# Patient Record
Sex: MALE | Race: WHITE | NOT HISPANIC OR LATINO | Employment: OTHER | URBAN - METROPOLITAN AREA
[De-identification: names, ages, dates, MRNs, and addresses within clinical notes are randomized per-mention and may not be internally consistent; named-entity substitution may affect disease eponyms.]

---

## 2018-03-13 ENCOUNTER — OFFICE VISIT CONVERTED (OUTPATIENT)
Dept: FAMILY MEDICINE CLINIC | Facility: CLINIC | Age: 73
End: 2018-03-13
Attending: FAMILY MEDICINE

## 2018-03-13 ENCOUNTER — CONVERSION ENCOUNTER (OUTPATIENT)
Dept: FAMILY MEDICINE CLINIC | Facility: CLINIC | Age: 73
End: 2018-03-13

## 2018-08-14 ENCOUNTER — OFFICE VISIT CONVERTED (OUTPATIENT)
Dept: FAMILY MEDICINE CLINIC | Facility: CLINIC | Age: 73
End: 2018-08-14
Attending: FAMILY MEDICINE

## 2018-10-29 ENCOUNTER — OFFICE VISIT CONVERTED (OUTPATIENT)
Dept: FAMILY MEDICINE CLINIC | Facility: CLINIC | Age: 73
End: 2018-10-29
Attending: FAMILY MEDICINE

## 2018-10-29 ENCOUNTER — CONVERSION ENCOUNTER (OUTPATIENT)
Dept: FAMILY MEDICINE CLINIC | Facility: CLINIC | Age: 73
End: 2018-10-29

## 2019-02-05 ENCOUNTER — CONVERSION ENCOUNTER (OUTPATIENT)
Dept: FAMILY MEDICINE CLINIC | Facility: CLINIC | Age: 74
End: 2019-02-05

## 2019-02-05 ENCOUNTER — OFFICE VISIT CONVERTED (OUTPATIENT)
Dept: FAMILY MEDICINE CLINIC | Facility: CLINIC | Age: 74
End: 2019-02-05
Attending: FAMILY MEDICINE

## 2019-12-10 ENCOUNTER — CONVERSION ENCOUNTER (OUTPATIENT)
Dept: FAMILY MEDICINE CLINIC | Facility: CLINIC | Age: 74
End: 2019-12-10

## 2019-12-10 ENCOUNTER — OFFICE VISIT CONVERTED (OUTPATIENT)
Dept: FAMILY MEDICINE CLINIC | Facility: CLINIC | Age: 74
End: 2019-12-10
Attending: FAMILY MEDICINE

## 2019-12-10 ENCOUNTER — HOSPITAL ENCOUNTER (OUTPATIENT)
Dept: FAMILY MEDICINE CLINIC | Facility: CLINIC | Age: 74
Discharge: HOME | End: 2019-12-10
Attending: FAMILY MEDICINE

## 2019-12-10 LAB
ALBUMIN SERPL-MCNC: 4.6 G/DL (ref 3.5–5)
ALBUMIN/GLOB SERPL: 1.8 {RATIO} (ref 1.4–2.6)
ALP SERPL-CCNC: 79 U/L (ref 56–155)
ALT SERPL-CCNC: 31 U/L (ref 10–40)
ANION GAP SERPL CALC-SCNC: 18 MMOL/L (ref 8–19)
AST SERPL-CCNC: 30 U/L (ref 15–50)
BASOPHILS # BLD AUTO: 0.07 10*3/UL (ref 0–0.2)
BASOPHILS NFR BLD AUTO: 0.8 % (ref 0–3)
BILIRUB SERPL-MCNC: 0.97 MG/DL (ref 0.2–1.3)
BUN SERPL-MCNC: 17 MG/DL (ref 5–25)
BUN/CREAT SERPL: 15 {RATIO} (ref 6–20)
CALCIUM SERPL-MCNC: 9.9 MG/DL (ref 8.7–10.4)
CHLORIDE SERPL-SCNC: 98 MMOL/L (ref 99–111)
CHOLEST SERPL-MCNC: 144 MG/DL (ref 107–200)
CHOLEST/HDLC SERPL: 4.2 {RATIO} (ref 3–6)
CONV ABS IMM GRAN: 0.01 10*3/UL (ref 0–0.2)
CONV CO2: 25 MMOL/L (ref 22–32)
CONV IMMATURE GRAN: 0.1 % (ref 0–1.8)
CONV TOTAL PROTEIN: 7.1 G/DL (ref 6.3–8.2)
CREAT UR-MCNC: 1.1 MG/DL (ref 0.7–1.2)
DEPRECATED RDW RBC AUTO: 44 FL (ref 35.1–43.9)
EOSINOPHIL # BLD AUTO: 0.17 10*3/UL (ref 0–0.7)
EOSINOPHIL # BLD AUTO: 2 % (ref 0–7)
ERYTHROCYTE [DISTWIDTH] IN BLOOD BY AUTOMATED COUNT: 12.1 % (ref 11.6–14.4)
GFR SERPLBLD BASED ON 1.73 SQ M-ARVRAT: >60 ML/MIN/{1.73_M2}
GLOBULIN UR ELPH-MCNC: 2.5 G/DL (ref 2–3.5)
GLUCOSE SERPL-MCNC: 107 MG/DL (ref 70–99)
HCT VFR BLD AUTO: 43.2 % (ref 42–52)
HDLC SERPL-MCNC: 34 MG/DL (ref 40–60)
HGB BLD-MCNC: 14.5 G/DL (ref 14–18)
LDLC SERPL CALC-MCNC: 38 MG/DL (ref 70–100)
LYMPHOCYTES # BLD AUTO: 2.02 10*3/UL (ref 1–5)
LYMPHOCYTES NFR BLD AUTO: 23.9 % (ref 20–45)
MCH RBC QN AUTO: 32.7 PG (ref 27–31)
MCHC RBC AUTO-ENTMCNC: 33.6 G/DL (ref 33–37)
MCV RBC AUTO: 97.3 FL (ref 80–96)
MONOCYTES # BLD AUTO: 0.96 10*3/UL (ref 0.2–1.2)
MONOCYTES NFR BLD AUTO: 11.4 % (ref 3–10)
NEUTROPHILS # BLD AUTO: 5.22 10*3/UL (ref 2–8)
NEUTROPHILS NFR BLD AUTO: 61.8 % (ref 30–85)
NRBC CBCN: 0 % (ref 0–0.7)
OSMOLALITY SERPL CALC.SUM OF ELEC: 286 MOSM/KG (ref 273–304)
PLATELET # BLD AUTO: 250 10*3/UL (ref 130–400)
PMV BLD AUTO: 11.1 FL (ref 9.4–12.4)
POTASSIUM SERPL-SCNC: 4.4 MMOL/L (ref 3.5–5.3)
RBC # BLD AUTO: 4.44 10*6/UL (ref 4.7–6.1)
SODIUM SERPL-SCNC: 137 MMOL/L (ref 135–147)
TRIGL SERPL-MCNC: 360 MG/DL (ref 40–150)
TSH SERPL-ACNC: 1.05 M[IU]/L (ref 0.27–4.2)
VLDLC SERPL-MCNC: 72 MG/DL (ref 5–37)
WBC # BLD AUTO: 8.45 10*3/UL (ref 4.8–10.8)

## 2020-02-20 ENCOUNTER — OFFICE VISIT CONVERTED (OUTPATIENT)
Dept: FAMILY MEDICINE CLINIC | Facility: CLINIC | Age: 75
End: 2020-02-20
Attending: FAMILY MEDICINE

## 2020-10-29 ENCOUNTER — OFFICE VISIT CONVERTED (OUTPATIENT)
Dept: FAMILY MEDICINE CLINIC | Facility: CLINIC | Age: 75
End: 2020-10-29
Attending: FAMILY MEDICINE

## 2020-10-29 ENCOUNTER — CONVERSION ENCOUNTER (OUTPATIENT)
Dept: FAMILY MEDICINE CLINIC | Facility: CLINIC | Age: 75
End: 2020-10-29

## 2020-10-29 ENCOUNTER — HOSPITAL ENCOUNTER (OUTPATIENT)
Dept: FAMILY MEDICINE CLINIC | Facility: CLINIC | Age: 75
Discharge: HOME | End: 2020-10-29
Attending: FAMILY MEDICINE

## 2020-10-29 LAB
BASOPHILS # BLD AUTO: 0.07 10*3/UL (ref 0–0.2)
BASOPHILS NFR BLD AUTO: 0.8 % (ref 0–3)
CONV ABS IMM GRAN: 0.03 10*3/UL (ref 0–0.2)
CONV IMMATURE GRAN: 0.4 % (ref 0–1.8)
DEPRECATED RDW RBC AUTO: 45.7 FL (ref 35.1–43.9)
EOSINOPHIL # BLD AUTO: 0.37 10*3/UL (ref 0–0.7)
EOSINOPHIL # BLD AUTO: 4.5 % (ref 0–7)
ERYTHROCYTE [DISTWIDTH] IN BLOOD BY AUTOMATED COUNT: 12.7 % (ref 11.6–14.4)
ERYTHROCYTE [SEDIMENTATION RATE] IN BLOOD: 14 MM/H (ref 0–20)
HCT VFR BLD AUTO: 42 % (ref 42–52)
HGB BLD-MCNC: 14.4 G/DL (ref 14–18)
LYMPHOCYTES # BLD AUTO: 1.88 10*3/UL (ref 1–5)
LYMPHOCYTES NFR BLD AUTO: 22.7 % (ref 20–45)
MCH RBC QN AUTO: 33.3 PG (ref 27–31)
MCHC RBC AUTO-ENTMCNC: 34.3 G/DL (ref 33–37)
MCV RBC AUTO: 97 FL (ref 80–96)
MONOCYTES # BLD AUTO: 0.83 10*3/UL (ref 0.2–1.2)
MONOCYTES NFR BLD AUTO: 10 % (ref 3–10)
NEUTROPHILS # BLD AUTO: 5.12 10*3/UL (ref 2–8)
NEUTROPHILS NFR BLD AUTO: 61.6 % (ref 30–85)
NRBC CBCN: 0 % (ref 0–0.7)
PLATELET # BLD AUTO: 256 10*3/UL (ref 130–400)
PMV BLD AUTO: 11.2 FL (ref 9.4–12.4)
RBC # BLD AUTO: 4.33 10*6/UL (ref 4.7–6.1)
WBC # BLD AUTO: 8.3 10*3/UL (ref 4.8–10.8)

## 2020-10-30 ENCOUNTER — HOSPITAL ENCOUNTER (OUTPATIENT)
Dept: URGENT CARE | Facility: CLINIC | Age: 75
Discharge: HOME | End: 2020-10-30
Attending: NURSE PRACTITIONER

## 2020-11-01 LAB
CONV CELIAC DISEASE AB-IGA: 175 MG/DL (ref 68–408)
SARS-COV-2 RNA SPEC QL NAA+PROBE: NOT DETECTED
TTG IGA SER-ACNC: <2 U/ML (ref 0–3)

## 2020-12-16 ENCOUNTER — OFFICE VISIT CONVERTED (OUTPATIENT)
Dept: FAMILY MEDICINE CLINIC | Facility: CLINIC | Age: 75
End: 2020-12-16
Attending: FAMILY MEDICINE

## 2020-12-16 ENCOUNTER — HOSPITAL ENCOUNTER (OUTPATIENT)
Dept: FAMILY MEDICINE CLINIC | Facility: CLINIC | Age: 75
Discharge: HOME | End: 2020-12-16
Attending: FAMILY MEDICINE

## 2020-12-16 ENCOUNTER — CONVERSION ENCOUNTER (OUTPATIENT)
Dept: FAMILY MEDICINE CLINIC | Facility: CLINIC | Age: 75
End: 2020-12-16

## 2020-12-16 LAB
ALBUMIN SERPL-MCNC: 4.5 G/DL (ref 3.5–5)
ALBUMIN/GLOB SERPL: 1.6 {RATIO} (ref 1.4–2.6)
ALP SERPL-CCNC: 83 U/L (ref 56–155)
ALT SERPL-CCNC: 27 U/L (ref 10–40)
ANION GAP SERPL CALC-SCNC: 17 MMOL/L (ref 8–19)
AST SERPL-CCNC: 27 U/L (ref 15–50)
BASOPHILS # BLD AUTO: 0.06 10*3/UL (ref 0–0.2)
BASOPHILS NFR BLD AUTO: 0.7 % (ref 0–3)
BILIRUB SERPL-MCNC: 0.71 MG/DL (ref 0.2–1.3)
BUN SERPL-MCNC: 20 MG/DL (ref 5–25)
BUN/CREAT SERPL: 17 {RATIO} (ref 6–20)
CALCIUM SERPL-MCNC: 9.6 MG/DL (ref 8.7–10.4)
CHLORIDE SERPL-SCNC: 97 MMOL/L (ref 99–111)
CHOLEST SERPL-MCNC: 173 MG/DL (ref 107–200)
CHOLEST/HDLC SERPL: 4.2 {RATIO} (ref 3–6)
CONV ABS IMM GRAN: 0.03 10*3/UL (ref 0–0.2)
CONV CO2: 28 MMOL/L (ref 22–32)
CONV IMMATURE GRAN: 0.3 % (ref 0–1.8)
CONV TOTAL PROTEIN: 7.3 G/DL (ref 6.3–8.2)
CREAT UR-MCNC: 1.2 MG/DL (ref 0.7–1.2)
DEPRECATED RDW RBC AUTO: 44.9 FL (ref 35.1–43.9)
EOSINOPHIL # BLD AUTO: 0.17 10*3/UL (ref 0–0.7)
EOSINOPHIL # BLD AUTO: 1.9 % (ref 0–7)
ERYTHROCYTE [DISTWIDTH] IN BLOOD BY AUTOMATED COUNT: 12.6 % (ref 11.6–14.4)
GFR SERPLBLD BASED ON 1.73 SQ M-ARVRAT: 58 ML/MIN/{1.73_M2}
GLOBULIN UR ELPH-MCNC: 2.8 G/DL (ref 2–3.5)
GLUCOSE SERPL-MCNC: 96 MG/DL (ref 70–99)
HCT VFR BLD AUTO: 44.8 % (ref 42–52)
HDLC SERPL-MCNC: 41 MG/DL (ref 40–60)
HGB BLD-MCNC: 15.3 G/DL (ref 14–18)
LDLC SERPL CALC-MCNC: 69 MG/DL (ref 70–100)
LYMPHOCYTES # BLD AUTO: 1.95 10*3/UL (ref 1–5)
LYMPHOCYTES NFR BLD AUTO: 22.3 % (ref 20–45)
MCH RBC QN AUTO: 33 PG (ref 27–31)
MCHC RBC AUTO-ENTMCNC: 34.2 G/DL (ref 33–37)
MCV RBC AUTO: 96.6 FL (ref 80–96)
MONOCYTES # BLD AUTO: 0.87 10*3/UL (ref 0.2–1.2)
MONOCYTES NFR BLD AUTO: 9.9 % (ref 3–10)
NEUTROPHILS # BLD AUTO: 5.67 10*3/UL (ref 2–8)
NEUTROPHILS NFR BLD AUTO: 64.9 % (ref 30–85)
NRBC CBCN: 0 % (ref 0–0.7)
OSMOLALITY SERPL CALC.SUM OF ELEC: 288 MOSM/KG (ref 273–304)
PLATELET # BLD AUTO: 268 10*3/UL (ref 130–400)
PMV BLD AUTO: 11.1 FL (ref 9.4–12.4)
POTASSIUM SERPL-SCNC: 4.2 MMOL/L (ref 3.5–5.3)
RBC # BLD AUTO: 4.64 10*6/UL (ref 4.7–6.1)
SODIUM SERPL-SCNC: 138 MMOL/L (ref 135–147)
TRIGL SERPL-MCNC: 453 MG/DL (ref 40–150)
TSH SERPL-ACNC: 1.88 M[IU]/L (ref 0.27–4.2)
WBC # BLD AUTO: 8.75 10*3/UL (ref 4.8–10.8)

## 2021-02-03 ENCOUNTER — OFFICE VISIT CONVERTED (OUTPATIENT)
Dept: FAMILY MEDICINE CLINIC | Facility: CLINIC | Age: 76
End: 2021-02-03
Attending: FAMILY MEDICINE

## 2021-02-03 ENCOUNTER — HOSPITAL ENCOUNTER (OUTPATIENT)
Dept: GENERAL RADIOLOGY | Facility: HOSPITAL | Age: 76
Discharge: HOME | End: 2021-02-03
Attending: FAMILY MEDICINE

## 2021-02-03 ENCOUNTER — HOSPITAL ENCOUNTER (OUTPATIENT)
Dept: FAMILY MEDICINE CLINIC | Facility: CLINIC | Age: 76
Discharge: HOME | End: 2021-02-03
Attending: FAMILY MEDICINE

## 2021-02-03 LAB — ERYTHROCYTE [SEDIMENTATION RATE] IN BLOOD: 6 MM/H (ref 0–20)

## 2021-02-09 ENCOUNTER — HOSPITAL ENCOUNTER (OUTPATIENT)
Dept: PHYSICAL THERAPY | Facility: CLINIC | Age: 76
Setting detail: RECURRING SERIES
Discharge: HOME | End: 2021-03-11
Attending: FAMILY MEDICINE

## 2021-04-19 ENCOUNTER — CONVERSION ENCOUNTER (OUTPATIENT)
Dept: FAMILY MEDICINE CLINIC | Facility: CLINIC | Age: 76
End: 2021-04-19

## 2021-04-19 ENCOUNTER — OFFICE VISIT CONVERTED (OUTPATIENT)
Dept: FAMILY MEDICINE CLINIC | Facility: CLINIC | Age: 76
End: 2021-04-19
Attending: FAMILY MEDICINE

## 2021-04-19 ENCOUNTER — HOSPITAL ENCOUNTER (OUTPATIENT)
Dept: FAMILY MEDICINE CLINIC | Facility: CLINIC | Age: 76
Discharge: HOME | End: 2021-04-19
Attending: FAMILY MEDICINE

## 2021-04-19 LAB
ALBUMIN SERPL-MCNC: 4.3 G/DL (ref 3.5–5)
ALBUMIN/GLOB SERPL: 1.6 {RATIO} (ref 1.4–2.6)
ALP SERPL-CCNC: 73 U/L (ref 56–155)
ALT SERPL-CCNC: 28 U/L (ref 10–40)
ANION GAP SERPL CALC-SCNC: 15 MMOL/L (ref 8–19)
AST SERPL-CCNC: 24 U/L (ref 15–50)
BASOPHILS # BLD AUTO: 0.05 10*3/UL (ref 0–0.2)
BASOPHILS NFR BLD AUTO: 0.7 % (ref 0–3)
BILIRUB SERPL-MCNC: 0.67 MG/DL (ref 0.2–1.3)
BUN SERPL-MCNC: 15 MG/DL (ref 5–25)
BUN/CREAT SERPL: 14 {RATIO} (ref 6–20)
CALCIUM SERPL-MCNC: 9.6 MG/DL (ref 8.7–10.4)
CHLORIDE SERPL-SCNC: 99 MMOL/L (ref 99–111)
CONV ABS IMM GRAN: 0.02 10*3/UL (ref 0–0.2)
CONV CO2: 28 MMOL/L (ref 22–32)
CONV IMMATURE GRAN: 0.3 % (ref 0–1.8)
CONV TOTAL PROTEIN: 7 G/DL (ref 6.3–8.2)
CREAT UR-MCNC: 1.11 MG/DL (ref 0.7–1.2)
DEPRECATED RDW RBC AUTO: 44.8 FL (ref 35.1–43.9)
EOSINOPHIL # BLD AUTO: 0.16 10*3/UL (ref 0–0.7)
EOSINOPHIL # BLD AUTO: 2.2 % (ref 0–7)
ERYTHROCYTE [DISTWIDTH] IN BLOOD BY AUTOMATED COUNT: 12.7 % (ref 11.6–14.4)
GFR SERPLBLD BASED ON 1.73 SQ M-ARVRAT: >60 ML/MIN/{1.73_M2}
GLOBULIN UR ELPH-MCNC: 2.7 G/DL (ref 2–3.5)
GLUCOSE SERPL-MCNC: 108 MG/DL (ref 70–99)
HCT VFR BLD AUTO: 44.6 % (ref 42–52)
HGB BLD-MCNC: 15.1 G/DL (ref 14–18)
LYMPHOCYTES # BLD AUTO: 1.73 10*3/UL (ref 1–5)
LYMPHOCYTES NFR BLD AUTO: 23.9 % (ref 20–45)
MCH RBC QN AUTO: 32.5 PG (ref 27–31)
MCHC RBC AUTO-ENTMCNC: 33.9 G/DL (ref 33–37)
MCV RBC AUTO: 95.9 FL (ref 80–96)
MONOCYTES # BLD AUTO: 0.75 10*3/UL (ref 0.2–1.2)
MONOCYTES NFR BLD AUTO: 10.4 % (ref 3–10)
NEUTROPHILS # BLD AUTO: 4.52 10*3/UL (ref 2–8)
NEUTROPHILS NFR BLD AUTO: 62.5 % (ref 30–85)
NRBC CBCN: 0 % (ref 0–0.7)
OSMOLALITY SERPL CALC.SUM OF ELEC: 285 MOSM/KG (ref 273–304)
PLATELET # BLD AUTO: 252 10*3/UL (ref 130–400)
PMV BLD AUTO: 11.2 FL (ref 9.4–12.4)
POTASSIUM SERPL-SCNC: 4.6 MMOL/L (ref 3.5–5.3)
RBC # BLD AUTO: 4.65 10*6/UL (ref 4.7–6.1)
SODIUM SERPL-SCNC: 137 MMOL/L (ref 135–147)
TSH SERPL-ACNC: 2.23 M[IU]/L (ref 0.27–4.2)
VIT B12 SERPL-MCNC: 236 PG/ML (ref 211–911)
WBC # BLD AUTO: 7.23 10*3/UL (ref 4.8–10.8)

## 2021-04-20 ENCOUNTER — HOSPITAL ENCOUNTER (OUTPATIENT)
Dept: GENERAL RADIOLOGY | Facility: HOSPITAL | Age: 76
Discharge: HOME | End: 2021-04-20
Attending: FAMILY MEDICINE

## 2021-05-13 NOTE — PROGRESS NOTES
Progress Note      Patient Name: Dioni Macias   Patient ID: [id number]   Sex: Male   YOB: 1945    Primary Care Provider: Ubaldo Casillas III MD   Referring Provider: Ubaldo Casillas III MD    Visit Date: October 29, 2020    Provider: Ubaldo Casillas III MD   Location: [facility]   Location Address: [address] [place] [address]  [id number]   Location Phone: [phone]          Chief Complaint  · having diarrhea on and off for 2 months      History Of Present Illness  Dioni Macias is a 75 year old /White male who presents for evaluation and treatment of: Here today c/o diarrhea on and off for 2 months, he states he has abdominal cramping then he will have diarrhea. He had one episodes of spasms that he didn't make it to the bathroom, it doesn't give you any warning just happens suddenly.      HPI     patient 75 years old has had been having had on and off some liquid bowel movements more in the last 2 months.  Daughter is quite ill with cancer.  The patient also will have formed stool and often have one diarrhea discussed milk and limiting it.  Discussed just taking Metamucil.  When he has a diarrhea he does have lower abdominal cramping.    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  Respiratory no shortness of breath dyspnea exertion.  GI patient has lost some weight but was happy to lose it.  No recent antibiotics.  Not been out of the country.    Physical exam     weight is 166 this is a 8 pound weight loss since February.  Pulse ox is 97 heart rate 73 temperature 97.5 blood pressure 130/70.  No distress  Cardiovascular regular rhythm no murmur   respiratory no increased work of breathing lungs clear and equal bilaterally no rales no rhonchi  wheezes  Abdomen soft nontender no hepatosplenomegaly no masses no abnormal pulsations    Assessment     probable irritable bowel.  Need stool testing also will do sed rate and celiac sprue and CBC.    Plan     await test start Metamucil.  Also needs stool for C. difficile culture.       Past Medical History  Disease Name Date Onset Notes   Depression --  --    Depression, major, recurrent, mild 08/14/2018 --    Diverticulosis Of Colon --  --    Gastric reflux 08/14/2018 --    High blood pressure 12/10/2019 --    High cholesterol 12/10/2019 --    Hyperlipidemia NEC/NOS --  --    Medication management 12/10/2019 --    Routine adult health maintenance 12/10/2019 --          Past Surgical History  Procedure Name Date Notes   Cholecystectomy ? --    Colonoscopy --  --          Medication List  Name Date Started Instructions   atorvastatin 40 mg oral tablet 12/10/2019 TAKE ONE TABLET BY MOUTH EVERY NIGHT AT BEDTIME   duloxetine 60 mg oral capsule,delayed release(DR/EC) 12/10/2019 TAKE ONE CAPSULE BY MOUTH EVERY DAY   metoprolol succinate 25 mg oral tablet extended release 24 hr 12/10/2019 TAKE ONE-HALF TABLET BY MOUTH DAILY         Allergy List  Allergen Name Date Reaction Notes   NO KNOWN DRUG ALLERGIES --  --  --        Allergies Reconciled  Family Medical History  Disease Name Relative/Age Notes   Lung Neoplasm, Malignant Father/74   --    Stomach Neoplasm, Malignant Mother/66   --    Prostate cancer Brother/   --          Social History  Finding Status Start/Stop Quantity Notes   Advance Care Plan/Surrogate Decision Maker scanned into EMR --  --/-- --  --    Alcohol Current some day --/-- 2-4 per month 03/28/2017 -     --  --/-- --  --    Tobacco Former 15 1964/55 1997 1 pack QD --          Immunizations  NameDate Admin Mfg Trade Name Lot Number Route Inj VIS Given VIS Publication   Hepatitis A08/14/2018 DAISHA HAVRIX-ADULT  NE NE 10/29/2018 07/20/2016   Comments: pharmacy   Wezwmamlz46/29/2020 DAISHA FLUZONE-HIGH  "DOSE [id number] IM LD 09/29/2020    Comments: NDC: 74775-309-36 patient tolerated well   Prevnar 1311/26/2012 NE Not Entered  NE NE 10/29/2018 11/05/2015   Comments:    Vdgtwahl10/14/2018 NE Not Entered  NE NE 10/29/2018    Comments: shingrix         Review of Systems  · Constitutional  o * See HPI  · Eyes  o * See HPI  · HENT  o * See HPI  · Breasts  o * See HPI  · Cardiovascular  o * See HPI  · Respiratory  o * See HPI  · Gastrointestinal  o * See HPI  · Genitourinary  o * See HPI  · Integument  o * See HPI  · Neurologic  o * See HPI  · Musculoskeletal  o * See HPI  · Endocrine  o * See HPI  · Psychiatric  o * See HPI  · Heme-Lymph  o * See HPI  · Allergic-Immunologic  o * See HPI      Vitals  Date Time BP Position Site L\R Cuff Size HR RR TEMP (F) WT  HT  BMI kg/m2 BSA m2 O2 Sat FR L/min FiO2 HC       10/29/2020 02:25 /70 Sitting    73 - R  97.5 165lbs 16oz 5'  10\" 23.82 1.93 97 %  21%              Assessment  · Diarrhea     787.91/R19.7  · Fatigue     780.79/R53.83  · Abdominal cramping     789.00/R10.9    Problems Reconciled  Plan  · Orders  o Stool culture and sensitivity (69676) - 787.91/R19.7 - 10/29/2020  o C difficile Toxigenic Assay (PCR) [facility] (39961) - 787.91/R19.7 - 10/29/2020  o Celiac Disease Antibody Panel(Profile) (CELID) - 787.91/R19.7 - 10/29/2020  o CBC with Auto Diff [facility] (50716) - 787.91/R19.7, 789.00/R10.9, 780.79/R53.83 - 10/29/2020  o ACO-39: Current medications updated and reviewed (, 1159F) - - 10/29/2020  o Sed rate (20380) - 787.91/R19.7 - 10/29/2020  o [facility] NCOV2 (send-out) (16585) - 787.91/R19.7, 789.00/R10.9 - 10/29/2020  · Medications  o azithromycin 250 mg oral tablet   SIG: take 2 tablets (500 mg) by oral route once daily for 1 day then 1 tablet (250 mg) by oral route once daily for 4 days   DISP: (6) tablets with 0 refills  Discontinued on 10/29/2020     o omeprazole 40 mg oral capsule,delayed release(DR/EC)   SIG: take 1 capsule (40 mg) by oral route once " daily before a meal for 90 days   DISP: (90) Capsule with 3 refills  Discontinued on 10/29/2020     o Medications have been Reconciled  o Transition of Care or Provider Policy  · Instructions  o Patient is taking medications as prescribed and doing well.   o Take all medications as prescribed/directed.  o Patient instructed/educated on their diet and exercise program.  o Patient was educated/instructed on their diagnosis, treatment and medications prior to discharge from the clinic today.  o Bring all medicines with their bottles to each office visit.  o Time spent with the patient was 20 minutes, more than 50% face to face.  o Chronic conditions reviewed and taken into consideration for today's treatment plan.  o Discussed Covid-19 precautions including, but not limited to, social distancing, avoid touching your face, and hand washing.             Electronically Signed by: Kirsten Wong, -Author on October 29, 2020 07:36:26 PM  Electronically Co-signed by: Ubaldo Casillas III MD -Reviewer on November 2, 2020 01:48:16 PM

## 2021-05-14 VITALS
HEIGHT: 70 IN | OXYGEN SATURATION: 97 % | HEART RATE: 73 BPM | SYSTOLIC BLOOD PRESSURE: 130 MMHG | TEMPERATURE: 97.5 F | DIASTOLIC BLOOD PRESSURE: 70 MMHG | BODY MASS INDEX: 23.77 KG/M2 | WEIGHT: 166 LBS

## 2021-05-14 VITALS
SYSTOLIC BLOOD PRESSURE: 130 MMHG | HEART RATE: 66 BPM | HEIGHT: 70 IN | TEMPERATURE: 97.2 F | DIASTOLIC BLOOD PRESSURE: 72 MMHG | BODY MASS INDEX: 24.05 KG/M2 | WEIGHT: 168 LBS | OXYGEN SATURATION: 98 %

## 2021-05-14 VITALS
SYSTOLIC BLOOD PRESSURE: 134 MMHG | TEMPERATURE: 97.6 F | DIASTOLIC BLOOD PRESSURE: 70 MMHG | WEIGHT: 172 LBS | OXYGEN SATURATION: 97 % | HEART RATE: 73 BPM | BODY MASS INDEX: 24.62 KG/M2 | HEIGHT: 70 IN

## 2021-05-14 VITALS
SYSTOLIC BLOOD PRESSURE: 112 MMHG | DIASTOLIC BLOOD PRESSURE: 74 MMHG | BODY MASS INDEX: 25.05 KG/M2 | HEIGHT: 70 IN | WEIGHT: 175 LBS

## 2021-05-14 NOTE — PROGRESS NOTES
Progress Note      Patient Name: Dioni Macias   Patient ID: [id number]   Sex: Male   YOB: 1945    Primary Care Provider: Ubaldo Casillas III MD   Referring Provider: Ubaldo Casillas III MD    Visit Date: April 19, 2021    Provider: Ubaldo Casillas III MD   Location: [facility]   Location Address: [address] [place] [address]  [id number]   Location Phone: [phone]          Chief Complaint  · concerned about memory changes  · dizziness      History Of Present Illness  Dioni Macias is a 75 year old /White male who presents for evaluation and treatment of: here today concerned about memory, pt states he has a strong family history of dementia Alzheimer's. Pt has also had some dizziness that he is concerned about. Pt states it is ok to discuss results of all testing with Dr. Rivers and his wife.      HPI     patient 75 years old worried about memory loss having some patient times when he stands up he gets lightheaded.  Pt is depressed due to Daughter having metastatic pancreatic cancer.  Has a strong family history of dementia so he is worried about forgetting things getting spots of conversation forgetting where he lays things of forgetting discussed this with Dr. Rivers.    Review of systems     neurologic strong family history of dementia is for the low are noted dementia Alzheimer's type.  Still does the checkbook still does read  Psych depression screen is 15.  Has to push himself to do things.  This is felt worse and had more problems with standing up and have been lightheaded after he stopped working and being active every day.  Cardiovascular no chest pain no palpitation  Respiratory no shortness of breath no dyspnea exertion    Physical exam     weight is 175 is 3 pound weight gain blood pressure 112/74  General no distress although looks somewhat depressed and anxious.  ENT TMs are negative.  Neurologic mentation is normal speech is  normal gait is normal slum test done here in the office was 29 out of 30 which was excellent and his clock is perfect at 10:20   Cardiovascular regular rhythm no murmur  Respiratory no increased work of breathing no wheezes no    Assessment     #1 type up postural hypotension   #2 memory loss not appear to have any evidence of dementia   #3 depression we will increase duloxetine from 60-90 but this may be situational    Plan     increase exercise to 30 minutes to an hour day   increase duloxetine from 60 to 90 mg   get up slowly out of the car.    Will do a CT scan of the head   TSH B12 is done  will recheck in 1 month       Past Medical History  Disease Name Date Onset Notes   Depression --  --    Depression, major, recurrent, mild 08/14/2018 --    Diverticulosis Of Colon --  --    Gastric reflux 08/14/2018 --    High blood pressure 12/10/2019 --    High cholesterol 12/10/2019 --    Hyperlipidemia NEC/NOS --  --    Medication management 12/10/2019 --    Routine adult health maintenance 12/10/2019 --          Past Surgical History  Procedure Name Date Notes   Cholecystectomy ? --    Colonoscopy --  --          Medication List  Name Date Started Instructions   atorvastatin 40 mg oral tablet 12/16/2020 TAKE ONE TABLET BY MOUTH EVERY NIGHT AT BEDTIME   duloxetine 60 mg oral capsule,delayed release(/EC) 12/16/2020 TAKE ONE CAPSULE BY MOUTH EVERY DAY   metoprolol succinate 25 mg oral tablet extended release 24 hr 12/16/2020 TAKE ONE-HALF TABLET BY MOUTH DAILY         Allergy List  Allergen Name Date Reaction Notes   NO KNOWN DRUG ALLERGIES --  --  --        Allergies Reconciled  Family Medical History  Disease Name Relative/Age Notes   Lung Neoplasm, Malignant Father/74   --    Stomach Neoplasm, Malignant Mother/66   --    Prostate cancer Brother/   --          Social History  Finding Status Start/Stop Quantity Notes   Advance Care Plan/Surrogate Decision Maker scanned into EMR --  --/-- --  --    Alcohol Current some  "day --/-- 2-4 per month 03/28/2017 -     --  --/-- --  --    Tobacco Former 15 1964/55 1997 1 pack QD --          Immunizations  NameDate Admin Mfg Trade Name Lot Number Route Inj VIS Given VIS Publication   COVID Goihhs1101/28/2021 NE Not Entered  NE NE 02/03/2021    Comments:    Hepatitis A08/14/2018 SKB HAVRIX-ADULT  NE NE 10/29/2018 07/20/2016   Comments: pharmacy   Qkekoykhq61/29/2020 [facility] FLUZONE-HIGH DOSE [id number] IM LD 09/29/2020    Comments: NDC: 73610-139-81 patient tolerated well   Prevnar 1311/26/2012 NE Not Entered  NE NE 10/29/2018 11/05/2015   Comments:    Tdhbiulj97/14/2018 NE Not Entered  NE NE 10/29/2018    Comments: shingrix         Review of Systems  · Constitutional  o * See HPI  · Eyes  o * See HPI  · HENT  o * See HPI  · Breasts  o * See HPI  · Cardiovascular  o * See HPI  · Respiratory  o * See HPI  · Gastrointestinal  o * See HPI  · Genitourinary  o * See HPI  · Integument  o * See HPI  · Neurologic  o * See HPI  · Musculoskeletal  o * See HPI  · Endocrine  o * See HPI  · Psychiatric  o * See HPI  · Heme-Lymph  o * See HPI  · Allergic-Immunologic  o * See HPI      Vitals  Date Time BP Position Site L\R Cuff Size HR RR TEMP (F) WT  HT  BMI kg/m2 BSA m2 O2 Sat FR L/min FiO2 HC       04/19/2021 10:26 /74 Sitting        5'  10\"         04/19/2021 10:41 AM         174lbs 16oz 5'  10\" 25.11 1.98                     Assessment  · Screening for depression     V79.0/Z13.89  · Encounter for screening for cardiovascular disorders     V81.2/Z13.6  · Memory changes     780.93/R41.3  · Depression, major, recurrent, mild     296.31/F33.0  · High blood pressure     401.9/I10  · High cholesterol     272.0/E78.00  · Medication management     V58.69/Z79.899  · Dizziness     780.4/R42    Problems Reconciled  Plan  · Orders  o CBC with Auto Diff [facility] (19585) - 780.93/R41.3 - 04/19/2021  o CMP [facility] (86161) - 780.93/R41.3 - 04/19/2021  o Vitamin B12 level (64448) - 780.93/R41.3 - 04/19/2021  o TSH [facility] " (75704) - V58.69/Z79.899, 780.93/R41.3 - 04/19/2021  o ACO-18: Positive screen for clinical depression using a standardized tool and a follow-up plan documented () - - 04/19/2021  o ACO-14: Influenza immunization administered or previously received [facility] () - - 04/19/2021  o ACO-39: Current medications updated and reviewed (1159F, ) - - 04/19/2021  o CT Head/Brain without IV Contrast [facility] (20866) - 780.93/R41.3 - 04/20/2021  o Cognitive skills development assessment (73291, 50116) - 780.93/R41.3 - 04/19/2021  · Medications  o duloxetine 30 mg oral capsule,delayed release(DR/EC)   SIG: take 1 capsule (30 mg) by oral route once daily for 90 days   DISP: (90) Capsule with 1 refills  Prescribed on 04/19/2021     o Medications have been Reconciled  o Transition of Care or Provider Policy  · Instructions  o Depression Screen completed and scanned into the EMR under the designated folder within the patient's documents.  o Today's PHQ-9 result is _15__  o CMS (Medicare) estimates that one in six persons older than 65 suffers from depression and that depression in older patients occurs in 25% of those with other medical illnesses. US Preventative Services Task Force indicates that depression screening and support improves clinical outcomes in older adults. This service is covered by Medicare once a year as part of helping maintain a healthy you!  o The provider screening met the required time of 15 minutes.  o Patient is taking medications as prescribed and doing well.   o Take all medications as prescribed/directed.  o Patient instructed/educated on their diet and exercise program.  o Patient was educated/instructed on their diagnosis, treatment and medications prior to discharge from the clinic today.  o Bring all medicines with their bottles to each office visit.  o Time spent with the patient was 50 minutes, more than 50% face to face.  o Chronic conditions reviewed and taken into consideration for today's  treatment plan.  o Discussed Covid-19 precautions including, but not limited to, social distancing, avoid touching your face, and hand washing.             Electronically Signed by: Kirsten Wong, -Author on April 19, 2021 01:50:33 PM  Electronically Co-signed by: Ubaldo Casillas III MD -Reviewer on April 19, 2021 04:48:37 PM

## 2021-05-14 NOTE — PROGRESS NOTES
Progress Note      Patient Name: Dioni Macias   Patient ID: [id number]   Sex: Male   YOB: 1945    Primary Care Provider: Ubaldo Casillas III MD   Referring Provider: Ubaldo Casillas III MD    Visit Date: February 3, 2021    Provider: Ubaldo Casillas III MD   Location: [facility]   Location Address: [address] [place] [address]  [id number]   Location Phone: [phone]          Chief Complaint  · left sided neck pain with radiation into back of head      History Of Present Illness  Dioni Macias is a 75 year old /White male who presents for evaluation and treatment of: here today c/o left sided neck pain with radiation into back of head. Increased dizziness upon standing.      HPI    Patient 75 years old having left-sided neck pain for several weeks has been gradually getting worse.  crossword puzzles good bit of reading and goes to sleep in a chair discussed neck flexion.  Gave him neck sheet.    Review of systems     musculoskeletal  Left-sided neck quite tender.  below Lower neck also.  discussed positions, discussed using physical therapy.  we will get an x-ray of his neck and also a some physical therapy.    Physical exam     pulse ox 97 heart rate 73 temperature 97.6 blood pressure 134/70 weight 172  General looks like he is in some mild to moderate pain.    Cardiovascular regular rhythm no murmur   lungs clear and equal bilateral   musculoskeletal neck tender occipital prominence and below,  Is quite tender.    in office procedure patient had an occipital block placed with Marcaine Decadron and lidocaine.    Assessment     cervical muscle strain, occipital block placed in the left neck.      Plan    x-ray C-spine   physical therapy   take a neck sheet and watch positions of flexion       Past Medical History  Disease Name Date Onset Notes   Depression --  --    Depression, major, recurrent, mild 08/14/2018 --    Diverticulosis Of Colon  --  --    Gastric reflux 08/14/2018 --    High blood pressure 12/10/2019 --    High cholesterol 12/10/2019 --    Hyperlipidemia NEC/NOS --  --    Medication management 12/10/2019 --    Routine adult health maintenance 12/10/2019 --          Past Surgical History  Procedure Name Date Notes   Cholecystectomy ? --    Colonoscopy --  --          Medication List  Name Date Started Instructions   atorvastatin 40 mg oral tablet 12/16/2020 TAKE ONE TABLET BY MOUTH EVERY NIGHT AT BEDTIME   duloxetine 60 mg oral capsule,delayed release(DR/EC) 12/16/2020 TAKE ONE CAPSULE BY MOUTH EVERY DAY   metoprolol succinate 25 mg oral tablet extended release 24 hr 12/16/2020 TAKE ONE-HALF TABLET BY MOUTH DAILY         Allergy List  Allergen Name Date Reaction Notes   NO KNOWN DRUG ALLERGIES --  --  --        Allergies Reconciled  Family Medical History  Disease Name Relative/Age Notes   Lung Neoplasm, Malignant Father/74   --    Stomach Neoplasm, Malignant Mother/66   --    Prostate cancer Brother/   --          Social History  Finding Status Start/Stop Quantity Notes   Advance Care Plan/Surrogate Decision Maker scanned into EMR --  --/-- --  --    Alcohol Current some day --/-- 2-4 per month 03/28/2017 -     --  --/-- --  --    Tobacco Former 15 1964/55 1997 1 pack QD --          Immunizations  NameDate Admin Mfg Trade Name Lot Number Route Inj VIS Given VIS Publication   COVID Oqgrmc9501/28/2021 NE Not Entered  NE NE 02/03/2021    Comments:    Hepatitis A08/14/2018 SKB HAVRIX-ADULT  NE NE 10/29/2018 07/20/2016   Comments: pharmacy   Ljxxqqoij73/29/2020 [facility] FLUZONE-HIGH DOSE [id number] IM LD 09/29/2020    Comments: NDC: 20509-250-31 patient tolerated well   Prevnar 1311/26/2012 NE Not Entered  NE NE 10/29/2018 11/05/2015   Comments:    Dpiityll98/14/2018 NE Not Entered  NE NE 10/29/2018    Comments: shingrix         Review of Systems  · Constitutional  o * See HPI  · Eyes  o * See HPI  · HENT  o * See HPI  · Breasts  o * See  "HPI  · Cardiovascular  o * See HPI  · Respiratory  o * See HPI  · Gastrointestinal  o * See HPI  · Genitourinary  o * See HPI  · Integument  o * See HPI  · Neurologic  o * See HPI  · Musculoskeletal  o * See HPI  · Endocrine  o * See HPI  · Psychiatric  o * See HPI  · Heme-Lymph  o * See HPI  · Allergic-Immunologic  o * See HPI      Vitals  Date Time BP Position Site L\R Cuff Size HR RR TEMP (F) WT  HT  BMI kg/m2 BSA m2 O2 Sat FR L/min FiO2 HC       02/03/2021 11:23 /70 Sitting    73 - R  97.6 172lbs 0oz 5'  10\" 24.68 1.96 97 %  21%              Assessment  · Left Cervical pain (neck)     723.1/M54.2  · Radiating pain     780.96/R52  · Dizziness and giddiness     780.4/R42  · Cervical muscle strain, initial encounter     847.0/S16.1XXA    Problems Reconciled  Plan  · Orders  o Cervical Spine Complete H (95128) - 723.1/M54.2, 780.96/R52, 780.4/R42 - 02/03/2021  o ACO-39: Current medications updated and reviewed (1159F, ) - - 02/03/2021  o Sed rate (40584) - 723.1/M54.2, 780.96/R52, 780.4/R42 - 02/03/2021  o Occipital Nerve Block (65016) - 723.1/M54.2, 780.96/R52 - 02/03/2021   Marcaine 0.25% 10mg (4cc) lot #[id number] EXP 2/1/2022 NDC 4658539178, Lidocaine 1% 20mg (2cc)lot #[id number] EXP 03/01/2022 NDC 6382305501, Dexamethasone 4mg/ml (1cc) lot# [id number] exp 03/2021 NDC 7074905393  o PHYSICAL THERAPY CONSULTATION (PHYTH) - 723.1/M54.2, 780.96/R52 - 02/03/2021  · Medications  o Medications have been Reconciled  o Transition of Care or Provider Policy  · Instructions  o Patient is taking medications as prescribed and doing well.   o Take all medications as prescribed/directed.  o Patient instructed/educated on their diet and exercise program.  o Patient was educated/instructed on their diagnosis, treatment and medications prior to discharge from the clinic today.  o Bring all medicines with their bottles to each office visit.  o Time spent with the patient was 35 minutes, more than 50% face to face.  o Chronic " conditions reviewed and taken into consideration for today's treatment plan.  o Discussed Covid-19 precautions including, but not limited to, social distancing, avoid touching your face, and hand washing.             Electronically Signed by: Kirsten Wong, -Author on February 3, 2021 06:49:41 PM  Electronically Co-signed by: Ubaldo Casillas III MD -Reviewer on February 4, 2021 01:37:12 PM

## 2021-05-14 NOTE — PROGRESS NOTES
Progress Note      Patient Name: Dioni Macias   Patient ID: [id number]   Sex: Male   YOB: 1945    Primary Care Provider: Ubaldo Casillas III MD   Referring Provider: Ubaldo Casillas III MD    Visit Date: December 16, 2020    Provider: Ubaldo Casillas III MD   Location: [facility]   Location Address: [address] [place] [address]  [id number]   Location Phone: [phone]          Chief Complaint  · Adult General Male Physical Exam      History Of Present Illness  Dioni Macias is a 75 year old /White male who presents for evaluation and treatment of: complete physical.      HPI     patient 75 year old here for complete physical.  history of high blood pressure, hypercholesterol, mild depression, gastroesophageal reflux     Review of systems     cardiovascular no chest pain no palpitations  Respiratory no shortness of breath no dyspnea on exertion  GI colonoscopy 2015 normal except for diverticuli  Musculoskeletal no particular joint problems presently   2018 normal renal ultrasound  Psych daughter has metastatic pancreatic pain    Physical exam     weight is 168 2 pound weight gain blood pressure 130/72 temperature 97.2 heart rate 66 pulse ox 98%  General no distress  HEENT sclera and conjunctiva are clear TMs are negative no oral lesions  neck no adenopathy no thyromegaly  Respiratory no increased work of breathing lungs clear and equal bilaterally no rales no rhonchi no wheezes  Cardiovascular regular rhythm no murmur  Abdomen soft and nontender no hepatosplenomegaly no masses  Genitalia no hernia testicles normal penis normal  Rectal 1+ prostate hypertrophy no nodules no asymmetry  Skin negative toenails are thickened probably fungus but best not to treat  Neurologic mentation is normal speech is normal gait is normal  Musculoskeletal knees show no significant crepitations hips show good internal and external rotation  Psych patient does  not seem either anxious or depressed    Assessment     hypercholesterolemia well treated no significant   hypertension controlled    Plan     recheck in 1 year       Past Medical History  Disease Name Date Onset Notes   Depression --  --    Depression, major, recurrent, mild 08/14/2018 --    Diverticulosis Of Colon --  --    Gastric reflux 08/14/2018 --    High blood pressure 12/10/2019 --    High cholesterol 12/10/2019 --    Hyperlipidemia NEC/NOS --  --    Medication management 12/10/2019 --    Routine adult health maintenance 12/10/2019 --          Past Surgical History  Procedure Name Date Notes   Cholecystectomy ? --    Colonoscopy --  --          Medication List  Name Date Started Instructions   atorvastatin 40 mg oral tablet 12/16/2020 TAKE ONE TABLET BY MOUTH EVERY NIGHT AT BEDTIME   duloxetine 60 mg oral capsule,delayed release(DR/EC) 12/16/2020 TAKE ONE CAPSULE BY MOUTH EVERY DAY   metoprolol succinate 25 mg oral tablet extended release 24 hr 12/16/2020 TAKE ONE-HALF TABLET BY MOUTH DAILY         Allergy List  Allergen Name Date Reaction Notes   NO KNOWN DRUG ALLERGIES --  --  --        Allergies Reconciled  Family Medical History  Disease Name Relative/Age Notes   Lung Neoplasm, Malignant Father/74   --    Stomach Neoplasm, Malignant Mother/66   --    Prostate cancer Brother/   --          Social History  Finding Status Start/Stop Quantity Notes   Advance Care Plan/Surrogate Decision Maker scanned into EMR --  --/-- --  --    Alcohol Current some day --/-- 2-4 per month 03/28/2017 -     --  --/-- --  --    Tobacco Former 15 1964/55 1997 1 pack QD --          Immunizations  NameDate Admin Mfg Trade Name Lot Number Route Inj VIS Given VIS Publication   Hepatitis A08/14/2018 SKB HAVRIX-ADULT  NE NE 10/29/2018 07/20/2016   Comments: pharmacy   Wprlqwxds27/29/2020 SKB FLUZONE-HIGH DOSE [id number] IM LD 09/29/2020    Comments: NDC: 82655-309-74 patient tolerated well   Prevnar 1311/26/2012 NE Not Entered   "NE NE 10/29/2018 11/05/2015   Comments:    Eebtolms21/14/2018 NE Not Entered  NE NE 10/29/2018    Comments: shingrix         Review of Systems  · Constitutional  o * See HPI  · Eyes  o * See HPI  · HENT  o * See HPI  · Breasts  o * See HPI  · Cardiovascular  o * See HPI  · Respiratory  o * See HPI  · Gastrointestinal  o * See HPI  · Genitourinary  o * See HPI  · Integument  o * See HPI  · Neurologic  o * See HPI  · Musculoskeletal  o * See HPI  · Endocrine  o * See HPI  · Psychiatric  o * See HPI  · Heme-Lymph  o * See HPI  · Allergic-Immunologic  o * See HPI      Vitals  Date Time BP Position Site L\R Cuff Size HR RR TEMP (F) WT  HT  BMI kg/m2 BSA m2 O2 Sat FR L/min FiO2        12/16/2020 04:20 /72 Sitting    66 - R  97.2 168lbs 0oz 5'  10\" 24.11 1.94 98 %  21%              Results  · In-Office Procedures  o Lab procedure  § IOP - Urinalysis without Microscopy (Clinitek) [facility] ([id number])   § Color Ur: Yellow   § Clarity Ur: Clear   § Glucose Ur Ql Strip: Negative   § Bilirub Ur Ql Strip: Negative   § Ketones Ur Ql Strip: Negative   § Sp Gr Ur Qn: 1.025   § Hgb Ur Ql Strip: Negative   § pH Ur-LsCnc: 5.0   § Prot Ur Ql Strip: Negative   § Urobilinogen Ur Strip-mCnc: 0.2 E.U./dL   § Nitrite Ur Ql Strip: Negative   § WBC Est Ur Ql Strip: Negative       Assessment  · General Medical Exam, Adult (CPE)     V70.0/Z00.00  · Encounter for screening for cardiovascular disorders     V81.2/Z13.6  · Depression, major, recurrent, mild     296.31/F33.0  · High blood pressure     401.9/I10  · High cholesterol     272.0/E78.00  · Medication management     V58.69/Z79.899  · Routine adult health maintenance     V70.0/Z00.00    Problems Reconciled  Plan  · Orders  o Physical, Primary Care Panel (CBC, CMP, Lipid, TSH) [facility] ([id number], [id number], [id number], [id number]) - V58.69/Z79.899, V70.0/Z00.00, V81.2/Z13.6 - 12/16/2020  o ACO-39: Current medications updated and reviewed (1159F, ) - - 12/16/2020  · Medications  o atorvastatin 40 mg oral " tablet   SIG: TAKE ONE TABLET BY MOUTH EVERY NIGHT AT BEDTIME   DISP: (90) Tablet with 3 refills  Refilled on 12/16/2020     o duloxetine 60 mg oral capsule,delayed release(DR/EC)   SIG: TAKE ONE CAPSULE BY MOUTH EVERY DAY   DISP: (90) Capsule with 3 refills  Refilled on 12/16/2020     o metoprolol succinate 25 mg oral tablet extended release 24 hr   SIG: TAKE ONE-HALF TABLET BY MOUTH DAILY   DISP: (45) Tablet with 3 refills  Refilled on 12/16/2020     o Medications have been Reconciled  o Transition of Care or Provider Policy  · Instructions  o Patient is taking medications as prescribed and doing well.   o Take all medications as prescribed/directed.  o Patient instructed/educated on their diet and exercise program.  o Patient was educated/instructed on their diagnosis, treatment and medications prior to discharge from the clinic today.  o Bring all medicines with their bottles to each office visit.  o Time spent with the patient was 30 minutes, more than 50% face to face.  o Chronic conditions reviewed and taken into consideration for today's treatment plan.  o Discussed Covid-19 precautions including, but not limited to, social distancing, avoid touching your face, and hand washing.             Electronically Signed by: Kirsten Wong, -Author on December 16, 2020 06:34:19 PM  Electronically Co-signed by: Ubaldo Casillas III MD -Reviewer on December 17, 2020 01:24:53 PM

## 2021-05-15 VITALS
HEIGHT: 70 IN | BODY MASS INDEX: 25.05 KG/M2 | DIASTOLIC BLOOD PRESSURE: 68 MMHG | WEIGHT: 175 LBS | SYSTOLIC BLOOD PRESSURE: 132 MMHG

## 2021-05-15 VITALS
DIASTOLIC BLOOD PRESSURE: 70 MMHG | BODY MASS INDEX: 24.91 KG/M2 | HEIGHT: 70 IN | WEIGHT: 174 LBS | SYSTOLIC BLOOD PRESSURE: 110 MMHG

## 2021-05-16 VITALS
HEIGHT: 70 IN | DIASTOLIC BLOOD PRESSURE: 62 MMHG | SYSTOLIC BLOOD PRESSURE: 120 MMHG | BODY MASS INDEX: 24.62 KG/M2 | WEIGHT: 172 LBS

## 2021-05-16 VITALS
DIASTOLIC BLOOD PRESSURE: 60 MMHG | HEIGHT: 70 IN | BODY MASS INDEX: 24.62 KG/M2 | WEIGHT: 172 LBS | SYSTOLIC BLOOD PRESSURE: 134 MMHG

## 2021-05-16 VITALS
BODY MASS INDEX: 24.91 KG/M2 | HEIGHT: 70 IN | WEIGHT: 174 LBS | SYSTOLIC BLOOD PRESSURE: 120 MMHG | DIASTOLIC BLOOD PRESSURE: 70 MMHG

## 2021-05-16 VITALS
DIASTOLIC BLOOD PRESSURE: 62 MMHG | WEIGHT: 169 LBS | SYSTOLIC BLOOD PRESSURE: 100 MMHG | TEMPERATURE: 97.6 F | BODY MASS INDEX: 24.2 KG/M2 | HEIGHT: 70 IN

## 2021-05-19 ENCOUNTER — OFFICE VISIT CONVERTED (OUTPATIENT)
Dept: FAMILY MEDICINE CLINIC | Facility: CLINIC | Age: 76
End: 2021-05-19
Attending: FAMILY MEDICINE

## 2021-06-05 NOTE — PROGRESS NOTES
Progress Note      Patient Name: Dioni Macias   Patient ID: [id number]   Sex: Male   YOB: 1945    Primary Care Provider: Ubaldo Casillas III MD   Referring Provider: Ubaldo Casillas III MD    Visit Date: May 19, 2021    Provider: Ubaldo Casillas III MD   Location: [facility]   Location Address: [address] [place] [address]  [id number]   Location Phone: [phone]          Chief Complaint  · follow up memory concerns      History Of Present Illness  Dioni Macias is a 75 year old /White male who presents for evaluation and treatment of:      HPI     patient 75 years old patient has had some nonspecific dizziness although does seem to be more when he out of off the mower and gets up out of a low car.  So is probably postural.  Does have some lightheadedness.  Only lasts a number of seconds.  Has been worried about some of his relatives becoming demented but there is no evidence of that.    history of depression  history of generalized anxiety   history of hypertension     Review of systems     ENT negative vertigo.  Most dizziness when changes posture needs to do that slowly.    Cardiovascular regular rhythm no murmur  Respiratory no shortness of breath no dyspnea exertion  Psych patient states he notices no difference when he takes the duloxetine 90 versus the 60 can go back to 60.    Physical exam    blood pressure 128/68 heart rate 71 temperature 97.5 pulse ox 97 weight is 175  General note distress  Cardiovascular regular rhythm no murmur  Neurologic mentation is normal speech normal gait is normal  Respiratory no increased work of breathing    Assessment     #1 postural hypotension   #2 generalized anxiety   #3 about possible decreased cognition but this is not supported with the our evaluation.  No significant decrease in cognition that we can tell.    Plan     recheck as needed get up slowly.       Past Medical History  Disease Name  Date Onset Notes   Depression --  --    Depression, major, recurrent, mild 08/14/2018 --    Diverticulosis Of Colon --  --    Gastric reflux 08/14/2018 --    High blood pressure 12/10/2019 --    High cholesterol 12/10/2019 --    Hyperlipidemia NEC/NOS --  --    Medication management 12/10/2019 --    Routine adult health maintenance 12/10/2019 --          Past Surgical History  Procedure Name Date Notes   Cholecystectomy ? --    Colonoscopy --  --          Medication List  Name Date Started Instructions   atorvastatin 40 mg oral tablet 12/16/2020 TAKE ONE TABLET BY MOUTH EVERY NIGHT AT BEDTIME   duloxetine 60 mg oral capsule,delayed release(DR/EC) 12/16/2020 TAKE ONE CAPSULE BY MOUTH EVERY DAY   metoprolol succinate 25 mg oral tablet extended release 24 hr 12/16/2020 TAKE ONE-HALF TABLET BY MOUTH DAILY         Allergy List  Allergen Name Date Reaction Notes   NO KNOWN DRUG ALLERGIES --  --  --          Family Medical History  Disease Name Relative/Age Notes   Lung Neoplasm, Malignant Father/74   --    Stomach Neoplasm, Malignant Mother/66   --    Prostate cancer Brother/   --          Social History  Finding Status Start/Stop Quantity Notes   Advance Care Plan/Surrogate Decision Maker scanned into EMR --  --/-- --  --    Alcohol Current some day --/-- 2-4 per month 03/28/2017 -     --  --/-- --  --    Tobacco Former 15 1964/55 1997 1 pack QD --          Immunizations  NameDate Admin Mfg Trade Name Lot Number Route Inj VIS Given VIS Publication   COVID Cmgrwg9902/25/2021 NE Pfizer-Pipewise COVID-19 Vaccine  NE NE 05/19/2021    Comments:    COVID Ibgxnt8201/28/2021 NE Not Entered  NE NE 02/03/2021    Comments:    Hepatitis A08/14/2018 SKB HAVRIX-ADULT  NE NE 10/29/2018 07/20/2016   Comments: pharmacy   Zjpihusoo23/29/2020 SKB FLUZONE-HIGH DOSE [id number] IM LD 09/29/2020    Comments: NDC: 38492-092-71 patient tolerated well   Prevnar 1311/26/2012 NE Not Entered  NE NE 10/29/2018 11/05/2015   Comments:   "  Thurhbii98/14/2018 NE Not Entered  NE NE 10/29/2018    Comments: shingrix         Review of Systems  · Constitutional  o * See HPI  · Eyes  o * See HPI  · HENT  o * See HPI  · Breasts  o * See HPI  · Cardiovascular  o * See HPI  · Respiratory  o * See HPI  · Gastrointestinal  o * See HPI  · Genitourinary  o * See HPI  · Integument  o * See HPI  · Neurologic  o * See HPI  · Musculoskeletal  o * See HPI  · Endocrine  o * See HPI  · Psychiatric  o * See HPI  · Heme-Lymph  o * See HPI  · Allergic-Immunologic  o * See HPI      Vitals  Date Time BP Position Site L\R Cuff Size HR RR TEMP (F) WT  HT  BMI kg/m2 BSA m2 O2 Sat FR L/min FiO2 HC       05/19/2021 01:51 /68 Sitting    71 - R  97.5 174lbs 16oz 5'  10\" 25.11 1.98 97 %  21%              Assessment  · Depression, major, recurrent, mild     296.31/F33.0  · High blood pressure     401.9/I10  · High cholesterol     272.0/E78.00  · Medication management     V58.69/Z79.899  · Memory change     780.93/R41.3  · Dizziness     780.4/R42    Problems Reconciled  Plan  · Orders  o ACO-39: Current medications updated and reviewed (, 1159F) - - 05/19/2021  · Medications  o duloxetine 30 mg oral capsule,delayed release(DR/EC)   SIG: take 1 capsule (30 mg) by oral route once daily for 90 days   DISP: (90) Capsule with 1 refills  Discontinued on 05/19/2021     o Medications have been Reconciled  o Transition of Care or Provider Policy  · Instructions  o Patient is taking medications as prescribed and doing well.   o Take all medications as prescribed/directed.  o Patient instructed/educated on their diet and exercise program.  o Patient was educated/instructed on their diagnosis, treatment and medications prior to discharge from the clinic today.  o Bring all medicines with their bottles to each office visit.  o Time spent with the patient was 20 minutes, more than 50% face to face.  o Chronic conditions reviewed and taken into consideration for today's treatment " plan.  o Discussed Covid-19 precautions including, but not limited to, social distancing, avoid touching your face, and hand washing.             Electronically Signed by: Kirsten Wong, -Author on May 19, 2021 03:38:30 PM  Electronically Co-signed by: Ubaldo Casillas III MD -Reviewer on May 19, 2021 04:37:43 PM

## 2021-07-15 VITALS
BODY MASS INDEX: 25.05 KG/M2 | OXYGEN SATURATION: 97 % | TEMPERATURE: 97.5 F | HEIGHT: 70 IN | SYSTOLIC BLOOD PRESSURE: 128 MMHG | WEIGHT: 175 LBS | HEART RATE: 71 BPM | DIASTOLIC BLOOD PRESSURE: 68 MMHG

## 2021-09-13 PROBLEM — F33.0 DEPRESSION, MAJOR, RECURRENT, MILD: Status: ACTIVE | Noted: 2018-08-14

## 2021-09-13 PROBLEM — E78.5 OTHER AND UNSPECIFIED HYPERLIPIDEMIA: Status: ACTIVE | Noted: 2021-09-13

## 2021-09-13 PROBLEM — I10 HIGH BLOOD PRESSURE: Status: ACTIVE | Noted: 2019-12-10

## 2021-09-13 PROBLEM — K57.30 DIVERTICULOSIS OF COLON: Status: ACTIVE | Noted: 2021-09-13

## 2021-09-13 PROBLEM — K21.9 GASTRIC REFLUX: Status: ACTIVE | Noted: 2018-08-14

## 2021-10-04 ENCOUNTER — CLINICAL SUPPORT (OUTPATIENT)
Dept: FAMILY MEDICINE CLINIC | Facility: CLINIC | Age: 76
End: 2021-10-04

## 2021-10-04 DIAGNOSIS — Z23 ENCOUNTER FOR IMMUNIZATION: Primary | ICD-10-CM

## 2021-10-04 PROCEDURE — 90471 IMMUNIZATION ADMIN: CPT | Performed by: FAMILY MEDICINE

## 2021-10-04 PROCEDURE — 90662 IIV NO PRSV INCREASED AG IM: CPT | Performed by: FAMILY MEDICINE

## 2021-11-22 ENCOUNTER — CLINICAL SUPPORT (OUTPATIENT)
Dept: FAMILY MEDICINE CLINIC | Facility: CLINIC | Age: 76
End: 2021-11-22

## 2021-11-22 DIAGNOSIS — Z20.822 EXPOSURE TO COVID-19 VIRUS: Primary | ICD-10-CM

## 2021-11-22 LAB
EXPIRATION DATE: NORMAL
INTERNAL CONTROL: NORMAL
Lab: NORMAL
SARS-COV-2 AG UPPER RESP QL IA.RAPID: NOT DETECTED

## 2021-11-22 PROCEDURE — 87426 SARSCOV CORONAVIRUS AG IA: CPT | Performed by: FAMILY MEDICINE

## 2022-01-10 RX ORDER — METOPROLOL SUCCINATE 25 MG/1
TABLET, EXTENDED RELEASE ORAL
Qty: 45 TABLET | Refills: 0 | Status: SHIPPED | OUTPATIENT
Start: 2022-01-10 | End: 2022-04-11

## 2022-01-12 RX ORDER — ATORVASTATIN CALCIUM 40 MG/1
TABLET, FILM COATED ORAL
Qty: 90 TABLET | Refills: 0 | Status: SHIPPED | OUTPATIENT
Start: 2022-01-12 | End: 2022-04-11

## 2022-02-08 ENCOUNTER — OFFICE VISIT (OUTPATIENT)
Dept: FAMILY MEDICINE CLINIC | Facility: CLINIC | Age: 77
End: 2022-02-08

## 2022-02-08 VITALS
TEMPERATURE: 97.6 F | HEIGHT: 70 IN | HEART RATE: 76 BPM | BODY MASS INDEX: 25.2 KG/M2 | DIASTOLIC BLOOD PRESSURE: 70 MMHG | OXYGEN SATURATION: 98 % | WEIGHT: 176 LBS | SYSTOLIC BLOOD PRESSURE: 122 MMHG

## 2022-02-08 DIAGNOSIS — S76.312A STRAIN OF LEFT HAMSTRING MUSCLE, INITIAL ENCOUNTER: Primary | ICD-10-CM

## 2022-02-08 PROCEDURE — 99213 OFFICE O/P EST LOW 20 MIN: CPT | Performed by: FAMILY MEDICINE

## 2022-02-08 RX ORDER — DULOXETIN HYDROCHLORIDE 60 MG/1
60 CAPSULE, DELAYED RELEASE ORAL DAILY
COMMUNITY
End: 2022-03-10

## 2022-02-08 NOTE — PROGRESS NOTES
Chief Complaint  Back Pain (fell 2 weeks ago on the ice) and left leg pain    SUBJECTIVE  Dioni Macias presents to [facility]    Mr. Macias is a 76-year-old male who present today for evaluation of left leg pain. Patient reports slipping on ice and overstretching the posterior aspect of his leg into his lower back. He reports having some tenderness to palpation to the left hamstring. He is able to do his daily activities without any hindrance. He has pain to the hamstring when he stretches deeply.     He still experiences dizziness intermittently. The dizziness occurs randomly. He reports having dizzy episodes when he is walking upstairs out of the basement and recently when he went out to eat. The dizzy spells usually lasts for 2 minutes then resolves spontaneously. He denies having any associated syncopal episodes.     He is fully vaccinated against COVID-19 including the booster. He is up to date with the flu vaccine. Patient is also up to date with the shingles vaccine as well. The patient is due for labs which will be done in .   Unfortunately, his daughter is currently battling cancer. She is currently undergoing chemotherapy. He states that she seems to be tolerating these treatments well.     PAST MEDICAL HISTORY  No Known Allergies     No past surgical history on file.    Social History     Tobacco Use   • Smoking status: Former Smoker     Packs/day: 1.00     Years: 30.00     Pack years: 30.00     Types: Cigarettes     Quit date:      Years since quittin.1   • Smokeless tobacco: Former User     Types: Chew, Snuff     Quit date:    Substance Use Topics   • Alcohol use: Yes     Alcohol/week: 2.0 standard drinks     Types: 2 Cans of beer per week       No family history on file.     Health Maintenance Due   Topic Date Due   • TDAP/TD VACCINES (1 - Tdap) Never done   • Pneumococcal Vaccine 65+ (2 of 2 - PPSV23) 2013   • ZOSTER VACCINE (2 of  "2) 10/09/2018   • HEPATITIS C SCREENING  Never done   • ANNUAL WELLNESS VISIT  10/04/2021   • LIPID PANEL  10/04/2021        Last Completed Colonoscopy     This patient has no relevant Health Maintenance data.          REVIEW OF SYSTEMS    Review of systems was completed, and pertinent findings are noted in the HPI.    OBJECTIVE  Vitals:    02/08/22 1415   BP: 122/70   Pulse: 76   Temp: 97.6 °F (36.4 °C)   SpO2: 98%   Weight: 79.8 kg (176 lb)   Height: 177.8 cm (70\")     Body mass index is 25.25 kg/m².    PHYSICAL EXAM  Respiratory: Lungs clear to auscultation bilaterally.  Cardiovascular: Heart rate is 80 bpm, regular rate and rhythm. No murmurs.   Musculoskeletal: Tenderness in the left hamstring    ASSESSMENT & PLAN  Diagnoses and all orders for this visit:    1. Strain of left hamstring muscle, initial encounter (Primary)  - He has some tenderness to the left hamstring, suspect a hamstring tear.  X-rays are not warranted because this is a hamstring tear. Discussed treatment plan. This will take some time to heal, take Tylenol as needed. He is encouraged to stay active as he can.     Strain left hamstring          Patient was given instructions and counseling regarding his condition or for health maintenance advice. Please see specific information pulled into the AVS if appropriate.       Patient verbalized consent to the visit recording. I have personally performed the services described in this document as transcribed by the above individual, and it is both accurate and complete.   Transcribed from ambient dictation for Ubaldo Casillas III, MD by Ana AGUILAR Rep.  02/08/22   15:53 EST      "

## 2022-02-08 NOTE — PROGRESS NOTES
"Chief Complaint  Back Pain    SUBJECTIVE  Dioni Macias presents to [facility]    ***    PAST MEDICAL HISTORY  Not on File     No past surgical history on file.    Social History     Tobacco Use   • Smoking status: Not on file   • Smokeless tobacco: Not on file   Substance Use Topics   • Alcohol use: Not on file       No family history on file.     Health Maintenance Due   Topic Date Due   • TDAP/TD VACCINES (1 - Tdap) Never done   • Pneumococcal Vaccine 65+ (2 of 2 - PPSV23) 11/26/2013   • ZOSTER VACCINE (2 of 2) 10/09/2018   • HEPATITIS C SCREENING  Never done   • ANNUAL WELLNESS VISIT  10/04/2021   • LIPID PANEL  10/04/2021        Last Completed Colonoscopy     This patient has no relevant Health Maintenance data.          REVIEW OF SYSTEMS    ***    OBJECTIVE  Vitals:    02/08/22 1415   Weight: 79.8 kg (176 lb)   Height: 177.8 cm (70\")     Body mass index is 25.25 kg/m².    PHYSICAL EXAM    ***    ASSESSMENT & PLAN  There are no diagnoses linked to this encounter.      ***            Patient was given instructions and counseling regarding his condition or for health maintenance advice. Please see specific information pulled into the AVS if appropriate.   "

## 2022-03-10 DIAGNOSIS — F33.0 DEPRESSION, MAJOR, RECURRENT, MILD: Primary | ICD-10-CM

## 2022-03-10 RX ORDER — DULOXETIN HYDROCHLORIDE 60 MG/1
CAPSULE, DELAYED RELEASE ORAL
Qty: 90 CAPSULE | Refills: 1 | Status: SHIPPED | OUTPATIENT
Start: 2022-03-10 | End: 2022-04-20 | Stop reason: SDUPTHER

## 2022-04-08 DIAGNOSIS — E78.2 MIXED HYPERLIPIDEMIA: Primary | ICD-10-CM

## 2022-04-08 DIAGNOSIS — I10 PRIMARY HYPERTENSION: ICD-10-CM

## 2022-04-11 RX ORDER — METOPROLOL SUCCINATE 25 MG/1
TABLET, EXTENDED RELEASE ORAL
Qty: 45 TABLET | Refills: 0 | Status: SHIPPED | OUTPATIENT
Start: 2022-04-11 | End: 2022-04-20 | Stop reason: SDUPTHER

## 2022-04-11 RX ORDER — ATORVASTATIN CALCIUM 40 MG/1
TABLET, FILM COATED ORAL
Qty: 90 TABLET | Refills: 0 | Status: SHIPPED | OUTPATIENT
Start: 2022-04-11 | End: 2022-04-20 | Stop reason: SDUPTHER

## 2022-04-20 ENCOUNTER — OFFICE VISIT (OUTPATIENT)
Dept: FAMILY MEDICINE CLINIC | Facility: CLINIC | Age: 77
End: 2022-04-20

## 2022-04-20 VITALS
SYSTOLIC BLOOD PRESSURE: 120 MMHG | OXYGEN SATURATION: 100 % | BODY MASS INDEX: 25.2 KG/M2 | HEIGHT: 70 IN | DIASTOLIC BLOOD PRESSURE: 62 MMHG | WEIGHT: 176 LBS | HEART RATE: 77 BPM | TEMPERATURE: 97.8 F

## 2022-04-20 DIAGNOSIS — Z79.899 MEDICATION MANAGEMENT: ICD-10-CM

## 2022-04-20 DIAGNOSIS — I10 PRIMARY HYPERTENSION: ICD-10-CM

## 2022-04-20 DIAGNOSIS — E78.2 MIXED HYPERLIPIDEMIA: ICD-10-CM

## 2022-04-20 DIAGNOSIS — Z00.00 MEDICARE ANNUAL WELLNESS VISIT, SUBSEQUENT: Primary | ICD-10-CM

## 2022-04-20 DIAGNOSIS — Z11.59 NEED FOR HEPATITIS C SCREENING TEST: ICD-10-CM

## 2022-04-20 DIAGNOSIS — Z00.00 LABORATORY EXAM ORDERED AS PART OF ROUTINE GENERAL MEDICAL EXAMINATION: ICD-10-CM

## 2022-04-20 DIAGNOSIS — F33.0 DEPRESSION, MAJOR, RECURRENT, MILD: ICD-10-CM

## 2022-04-20 LAB
ALBUMIN SERPL-MCNC: 4.4 G/DL (ref 3.5–5.2)
ALBUMIN/GLOB SERPL: 1.6 G/DL
ALP SERPL-CCNC: 78 U/L (ref 39–117)
ALT SERPL W P-5'-P-CCNC: 47 U/L (ref 1–41)
ANION GAP SERPL CALCULATED.3IONS-SCNC: 11.8 MMOL/L (ref 5–15)
AST SERPL-CCNC: 37 U/L (ref 1–40)
BASOPHILS # BLD AUTO: 0.06 10*3/MM3 (ref 0–0.2)
BASOPHILS NFR BLD AUTO: 1 % (ref 0–1.5)
BILIRUB BLD-MCNC: NEGATIVE MG/DL
BILIRUB SERPL-MCNC: 0.8 MG/DL (ref 0–1.2)
BUN SERPL-MCNC: 14 MG/DL (ref 8–23)
BUN/CREAT SERPL: 11.6 (ref 7–25)
CALCIUM SPEC-SCNC: 9.6 MG/DL (ref 8.6–10.5)
CHLORIDE SERPL-SCNC: 98 MMOL/L (ref 98–107)
CHOLEST SERPL-MCNC: 129 MG/DL (ref 0–200)
CLARITY, POC: CLEAR
CO2 SERPL-SCNC: 26.2 MMOL/L (ref 22–29)
COLOR UR: YELLOW
CREAT SERPL-MCNC: 1.21 MG/DL (ref 0.76–1.27)
DEPRECATED RDW RBC AUTO: 43.6 FL (ref 37–54)
EGFRCR SERPLBLD CKD-EPI 2021: 61.3 ML/MIN/1.73
EOSINOPHIL # BLD AUTO: 0.26 10*3/MM3 (ref 0–0.4)
EOSINOPHIL NFR BLD AUTO: 4.5 % (ref 0.3–6.2)
ERYTHROCYTE [DISTWIDTH] IN BLOOD BY AUTOMATED COUNT: 12.7 % (ref 12.3–15.4)
EXPIRATION DATE: ABNORMAL
GLOBULIN UR ELPH-MCNC: 2.7 GM/DL
GLUCOSE SERPL-MCNC: 105 MG/DL (ref 65–99)
GLUCOSE UR STRIP-MCNC: NEGATIVE MG/DL
HCT VFR BLD AUTO: 43 % (ref 37.5–51)
HCV AB SER DONR QL: NORMAL
HDLC SERPL-MCNC: 33 MG/DL (ref 40–60)
HGB BLD-MCNC: 15 G/DL (ref 13–17.7)
IMM GRANULOCYTES # BLD AUTO: 0.01 10*3/MM3 (ref 0–0.05)
IMM GRANULOCYTES NFR BLD AUTO: 0.2 % (ref 0–0.5)
KETONES UR QL: NEGATIVE
LDLC SERPL CALC-MCNC: 51 MG/DL (ref 0–100)
LDLC/HDLC SERPL: 1.15 {RATIO}
LEUKOCYTE EST, POC: NEGATIVE
LYMPHOCYTES # BLD AUTO: 1.24 10*3/MM3 (ref 0.7–3.1)
LYMPHOCYTES NFR BLD AUTO: 21.3 % (ref 19.6–45.3)
Lab: ABNORMAL
MCH RBC QN AUTO: 32.5 PG (ref 26.6–33)
MCHC RBC AUTO-ENTMCNC: 34.9 G/DL (ref 31.5–35.7)
MCV RBC AUTO: 93.3 FL (ref 79–97)
MONOCYTES # BLD AUTO: 0.99 10*3/MM3 (ref 0.1–0.9)
MONOCYTES NFR BLD AUTO: 17 % (ref 5–12)
NEUTROPHILS NFR BLD AUTO: 3.26 10*3/MM3 (ref 1.7–7)
NEUTROPHILS NFR BLD AUTO: 56 % (ref 42.7–76)
NITRITE UR-MCNC: NEGATIVE MG/ML
NRBC BLD AUTO-RTO: 0 /100 WBC (ref 0–0.2)
PH UR: 5.5 [PH] (ref 5–8)
PLATELET # BLD AUTO: 212 10*3/MM3 (ref 140–450)
PMV BLD AUTO: 11 FL (ref 6–12)
POTASSIUM SERPL-SCNC: 4.5 MMOL/L (ref 3.5–5.2)
PROT SERPL-MCNC: 7.1 G/DL (ref 6–8.5)
PROT UR STRIP-MCNC: ABNORMAL MG/DL
RBC # BLD AUTO: 4.61 10*6/MM3 (ref 4.14–5.8)
RBC # UR STRIP: NEGATIVE /UL
SODIUM SERPL-SCNC: 136 MMOL/L (ref 136–145)
SP GR UR: 1.02 (ref 1–1.03)
TRIGL SERPL-MCNC: 290 MG/DL (ref 0–150)
TSH SERPL DL<=0.05 MIU/L-ACNC: 2.09 UIU/ML (ref 0.27–4.2)
UROBILINOGEN UR QL: NORMAL
VLDLC SERPL-MCNC: 45 MG/DL (ref 5–40)
WBC NRBC COR # BLD: 5.82 10*3/MM3 (ref 3.4–10.8)

## 2022-04-20 PROCEDURE — 81003 URINALYSIS AUTO W/O SCOPE: CPT | Performed by: FAMILY MEDICINE

## 2022-04-20 PROCEDURE — 36415 COLL VENOUS BLD VENIPUNCTURE: CPT | Performed by: FAMILY MEDICINE

## 2022-04-20 PROCEDURE — 80061 LIPID PANEL: CPT | Performed by: FAMILY MEDICINE

## 2022-04-20 PROCEDURE — 84443 ASSAY THYROID STIM HORMONE: CPT | Performed by: FAMILY MEDICINE

## 2022-04-20 PROCEDURE — 86803 HEPATITIS C AB TEST: CPT | Performed by: FAMILY MEDICINE

## 2022-04-20 PROCEDURE — G0439 PPPS, SUBSEQ VISIT: HCPCS | Performed by: FAMILY MEDICINE

## 2022-04-20 PROCEDURE — 1170F FXNL STATUS ASSESSED: CPT | Performed by: FAMILY MEDICINE

## 2022-04-20 PROCEDURE — 85025 COMPLETE CBC W/AUTO DIFF WBC: CPT | Performed by: FAMILY MEDICINE

## 2022-04-20 PROCEDURE — 1159F MED LIST DOCD IN RCRD: CPT | Performed by: FAMILY MEDICINE

## 2022-04-20 PROCEDURE — 80053 COMPREHEN METABOLIC PANEL: CPT | Performed by: FAMILY MEDICINE

## 2022-04-20 RX ORDER — ATORVASTATIN CALCIUM 40 MG/1
40 TABLET, FILM COATED ORAL
Qty: 90 TABLET | Refills: 3 | Status: SHIPPED | OUTPATIENT
Start: 2022-04-20

## 2022-04-20 RX ORDER — METOPROLOL SUCCINATE 25 MG/1
12.5 TABLET, EXTENDED RELEASE ORAL DAILY
Qty: 45 TABLET | Refills: 3 | Status: SHIPPED | OUTPATIENT
Start: 2022-04-20

## 2022-04-20 RX ORDER — DULOXETIN HYDROCHLORIDE 60 MG/1
60 CAPSULE, DELAYED RELEASE ORAL DAILY
Qty: 90 CAPSULE | Refills: 3 | Status: SHIPPED | OUTPATIENT
Start: 2022-04-20

## 2022-04-20 NOTE — PROGRESS NOTES
The ABCs of the Annual Wellness Visit  Initial Medicare Wellness Visit    Subjective   History of Present Illness:  Dioni Macias is a 76 y.o. male who presents for an Initial Medicare Wellness Visit.    The following portions of the patient's history were reviewed and   updated as appropriate:   He  has no past medical history on file.  He does not have any pertinent problems on file.  He  has no past surgical history on file.  His family history is not on file.  He  reports that he quit smoking about 22 years ago. His smoking use included cigarettes. He has a 30.00 pack-year smoking history. He quit smokeless tobacco use about 22 years ago.  His smokeless tobacco use included chew and snuff. He reports current alcohol use of about 2.0 standard drinks of alcohol per week. He reports that he does not use drugs.  Current Outpatient Medications   Medication Sig Dispense Refill   • atorvastatin (LIPITOR) 40 MG tablet Take 1 tablet by mouth every night at bedtime. 90 tablet 3   • DULoxetine (CYMBALTA) 60 MG capsule Take 1 capsule by mouth Daily. 90 capsule 3   • metoprolol succinate XL (TOPROL-XL) 25 MG 24 hr tablet Take 0.5 tablets by mouth Daily. 45 tablet 3   • Omega-3 Fatty Acids (FISH OIL CONCENTRATE PO) Take  by mouth.       No current facility-administered medications for this visit.     Current Outpatient Medications on File Prior to Visit   Medication Sig   • Omega-3 Fatty Acids (FISH OIL CONCENTRATE PO) Take  by mouth.   • [DISCONTINUED] atorvastatin (LIPITOR) 40 MG tablet TAKE ONE TABLET BY MOUTH EVERY NIGHT AT BEDTIME   • [DISCONTINUED] DULoxetine (CYMBALTA) 60 MG capsule TAKE ONE CAPSULE BY MOUTH DAILY   • [DISCONTINUED] metoprolol succinate XL (TOPROL-XL) 25 MG 24 hr tablet TAKE 1/2 TABLET BY MOUTH DAILY     No current facility-administered medications on file prior to visit.     He has No Known Allergies..     Compared to one year ago, the patient feels his physical   health is worse.    Compared to  one year ago, the patient feels his mental   health is worse.    Recent Hospitalizations:  He was not admitted to the hospital during the last year.       Current Medical Providers:  Patient Care Team:  Ubaldo Casillas III, MD as PCP - General (Family Medicine)    Outpatient Medications Prior to Visit   Medication Sig Dispense Refill   • Omega-3 Fatty Acids (FISH OIL CONCENTRATE PO) Take  by mouth.     • atorvastatin (LIPITOR) 40 MG tablet TAKE ONE TABLET BY MOUTH EVERY NIGHT AT BEDTIME 90 tablet 0   • DULoxetine (CYMBALTA) 60 MG capsule TAKE ONE CAPSULE BY MOUTH DAILY 90 capsule 1   • metoprolol succinate XL (TOPROL-XL) 25 MG 24 hr tablet TAKE 1/2 TABLET BY MOUTH DAILY 45 tablet 0     No facility-administered medications prior to visit.       No opioid medication identified on active medication list. I have reviewed chart for other potential  high risk medication/s and harmful drug interactions in the elderly.          Aspirin is not on active medication list.  Aspirin use is not indicated based on review of current medical condition/s. Risk of harm outweighs potential benefits.  .    Patient Active Problem List   Diagnosis   • Depression, major, recurrent, mild (HCC)   • Diverticulosis of colon   • Gastric reflux   • High blood pressure   • Other and unspecified hyperlipidemia   • Mixed hyperlipidemia     Advance Care Planning  Advance Directive is on file.  ACP discussion was held with the patient during this visit. Patient has an advance directive in EMR which is still valid.     REVIEW OF SYSTEMS  Vascular no chest pain no palpitations  Respiratory no shortness of breath no dyspnea exertion  Neurologic some lack of balance when he first gets out of the car does not keep him from doing anything and has not fallen no focal losses  Skin deposits keratin on his feet whitish spots which are benign  Psych patient states he misses work is off now to be able to help his daughter with her illness    Objective      "  Vitals:    22 1559   BP: 120/62   Pulse: 77   Temp: 97.8 °F (36.6 °C)   SpO2: 100%   Weight: 79.8 kg (176 lb)   Height: 177.8 cm (70\")     BMI Readings from Last 1 Encounters:   22 25.25 kg/m²   BMI is above normal parameters. Recommendations include: none (medical contraindication)    Does the patient have evidence of cognitive impairment? No    PHYSICAL EXAM  General no distress  HEENT sclera and conjunctiva are clear TMs negative no oral lesions  Neck no adenopathy no thyromegaly  Respiratory lungs clear and equal bilaterally  Cardiovascular regular rhythm no murmur  Abdomen soft nontender no hepatosplenomegaly   testicles are normal penis is normal no hernias rectal exams 1+ prostate hypertrophy  Neurologic finger to finger test is normal speech is normal mentation is normal walk down the [place] perfectly normal and turned closes his eyes and cannot push off balance  Skin multiple keratin deposits on his feet benign         HEALTH RISK ASSESSMENT    Smoking Status:  Social History     Tobacco Use   Smoking Status Former Smoker   • Packs/day: 1.00   • Years: 30.00   • Pack years: 30.00   • Types: Cigarettes   • Quit date:    • Years since quittin.3   Smokeless Tobacco Former User   • Types: Chew, Snuff   • Quit date:      Alcohol Consumption:  Social History     Substance and Sexual Activity   Alcohol Use Yes   • Alcohol/week: 2.0 standard drinks   • Types: 2 Cans of beer per week     Fall Risk Screen:    ONEL Fall Risk Assessment was completed, and patient is at LOW risk for falls.Assessment completed on:2022    Depression Screen:   PHQ-2/PHQ-9 Depression Screening 2022   Little Interest or Pleasure in Doing Things 1-->several days   Feeling Down, Depressed or Hopeless 1-->several days   Trouble Falling or Staying Asleep, or Sleeping Too Much 0-->not at all   Feeling Tired or Having Little Energy 0-->not at all   Poor Appetite or Overeating 0-->not at all   Feeling Bad " about Yourself - or that You are a Failure or Have Let Yourself or Your Family Down 0-->not at all   Trouble Concentrating on Things, Such as Reading the Newspaper or Watching Television 0-->not at all   Moving or Speaking So Slowly that Other People Could Have Noticed? Or the Opposite - Being So Fidgety 0-->not at all   Thoughts that You Would be Better Off Dead or of Hurting Yourself in Some Way 0-->not at all   PHQ-9: Brief Depression Severity Measure Score 2       Health Habits and Functional and Cognitive Screening:  Functional & Cognitive Status 4/20/2022   Do you have difficulty preparing food and eating? No   Do you have difficulty bathing yourself, getting dressed or grooming yourself? No   Do you have difficulty using the toilet? No   Do you have difficulty moving around from place to place? No   Do you have trouble with steps or getting out of a bed or a chair? No   Current Diet Well Balanced Diet   Dental Exam Up to date   Eye Exam Up to date   Exercise (times per week) 3 times per week   Current Exercises Include Yard Work;Walking;Gardening   Do you need help using the phone?  No   Are you deaf or do you have serious difficulty hearing?  No   Do you need help with transportation? No   Do you need help shopping? No   Do you need help preparing meals?  No   Do you need help with housework?  No   Do you need help with laundry? No   Do you need help taking your medications? No   Do you need help managing money? No   Do you ever drive or ride in a car without wearing a seat belt? No   Have you felt unusual stress, anger or loneliness in the last month? Yes   Who do you live with? Spouse   If you need help, do you have trouble finding someone available to you? No   Have you been bothered in the last four weeks by sexual problems? No   Do you have difficulty concentrating, remembering or making decisions? No       Age-appropriate Screening Schedule:  Refer to the list below for future screening recommendations  based on patient's age, sex and/or medical conditions. Orders for these recommended tests are listed in the plan section. The patient has been provided with a written plan.    Health Maintenance   Topic Date Due   • TDAP/TD VACCINES (1 - Tdap) Never done   • ZOSTER VACCINE (2 of 2) 10/09/2018   • LIPID PANEL  10/04/2021   • INFLUENZA VACCINE  08/01/2022            CMS Preventative Services Quick Reference  Risk Factors Identified During Encounter  None Identified  The above risks/problems have been discussed with the patient.  Follow up actions/plans if indicated are seen below in the Assessment/Plan Section.  Pertinent information has been shared with the patient in the After Visit Summary.      Follow Up:  No follow-ups on file.     An After Visit Summary and PPPS were made available to the patient.      I spent 45 minutes caring for Dioni on this date of service. This time includes time spent by me in the following activities:preparing for the visit, reviewing tests, obtaining and/or reviewing a separately obtained history, performing a medically appropriate examination and/or evaluation , counseling and educating the patient/family/caregiver, ordering medications, tests, or procedures, referring and communicating with other health care professionals , documenting information in the medical record, independently interpreting results and communicating that information with the patient/family/caregiver and care coordination    _________________________________________________________________________________________________--  Evaluation & Management    Chief Complaint  Medicare Wellness-subsequent, Med Management, and Rash (White spots on feet)    SUBJECTIVE  Dioni Macias presents to [facility]  [id number] history of mild depression wellness exam generally feeling well some mild balance occasionally when he turns faster gets that is not progressive    PAST  MEDICAL HISTORY  No Known Allergies     No past surgical history on file.    Social History     Tobacco Use   • Smoking status: Former Smoker     Packs/day: 1.00     Years: 30.00     Pack years: 30.00     Types: Cigarettes     Quit date:      Years since quittin.3   • Smokeless tobacco: Former User     Types: Chew, Snuff     Quit date:    Substance Use Topics   • Alcohol use: Yes     Alcohol/week: 2.0 standard drinks     Types: 2 Cans of beer per week       No family history on file.     Health Maintenance Due   Topic Date Due   • TDAP/TD VACCINES (1 - Tdap) Never done   • Pneumococcal Vaccine 65+ (2 - PPSV23 or PCV20) 2013   • ZOSTER VACCINE (2 of 2) 10/09/2018   • HEPATITIS C SCREENING  Never done   • LIPID PANEL  10/04/2021      Last Completed Colonoscopy     This patient has no relevant Health Maintenance data.            ASSESSMENT & PLAN  Diagnoses and all orders for this visit:    1. Medicare annual wellness visit, subsequent (Primary)  -     TSH  -     Comprehensive Metabolic Panel  -     Lipid Panel  -     CBC & Differential  -     POC Urinalysis Dipstick, Automated    2. Need for hepatitis C screening test  -     Hepatitis C Antibody  -     TSH  -     Comprehensive Metabolic Panel  -     Lipid Panel  -     CBC & Differential  -     POC Urinalysis Dipstick, Automated    3. Primary hypertension  -     TSH  -     Comprehensive Metabolic Panel  -     Lipid Panel  -     CBC & Differential  -     POC Urinalysis Dipstick, Automated  -     metoprolol succinate XL (TOPROL-XL) 25 MG 24 hr tablet; Take 0.5 tablets by mouth Daily.  Dispense: 45 tablet; Refill: 3    4. Depression, major, recurrent, mild (HCC)  -     TSH  -     Comprehensive Metabolic Panel  -     Lipid Panel  -     CBC & Differential  -     POC Urinalysis Dipstick, Automated  -     DULoxetine (CYMBALTA) 60 MG capsule; Take 1 capsule by mouth Daily.  Dispense: 90 capsule; Refill: 3    5. Mixed hyperlipidemia  -     TSH  -      Comprehensive Metabolic Panel  -     Lipid Panel  -     CBC & Differential  -     POC Urinalysis Dipstick, Automated  -     atorvastatin (LIPITOR) 40 MG tablet; Take 1 tablet by mouth every night at bedtime.  Dispense: 90 tablet; Refill: 3    6. Medication management  -     TSH  -     Comprehensive Metabolic Panel  -     Lipid Panel  -     CBC & Differential  -     POC Urinalysis Dipstick, Automated    7. Laboratory exam ordered as part of routine general medical examination  -     TSH  -     Comprehensive Metabolic Panel  -     Lipid Panel  -     CBC & Differential  -     POC Urinalysis Dipstick, Automated      Hypertension well controlled hyperlipidemia on atorvastatin continue atorvastatin needs a lipid and CMP dermatitis feet keratin deposits benign mild depression well-controlled on Cymbalta we will continue slight gait imbalance not clinically significant            Patient was given instructions and counseling regarding his condition or for health maintenance advice. Please see specific information pulled into the AVS if appropriate.

## 2022-06-08 DIAGNOSIS — Z20.822 EXPOSURE TO COVID-19 VIRUS: Primary | ICD-10-CM

## 2022-06-09 ENCOUNTER — CLINICAL SUPPORT (OUTPATIENT)
Dept: FAMILY MEDICINE CLINIC | Facility: CLINIC | Age: 77
End: 2022-06-09

## 2022-06-09 DIAGNOSIS — Z20.822 CLOSE EXPOSURE TO COVID-19 VIRUS: Primary | ICD-10-CM

## 2022-06-09 DIAGNOSIS — Z20.822 EXPOSURE TO COVID-19 VIRUS: ICD-10-CM

## 2022-06-09 LAB
EXPIRATION DATE: NORMAL
FLUAV AG UPPER RESP QL IA.RAPID: NOT DETECTED
FLUBV AG UPPER RESP QL IA.RAPID: NOT DETECTED
INTERNAL CONTROL: NORMAL
Lab: NORMAL
SARS-COV-2 AG UPPER RESP QL IA.RAPID: NOT DETECTED

## 2022-06-09 PROCEDURE — 87428 SARSCOV & INF VIR A&B AG IA: CPT | Performed by: FAMILY MEDICINE

## 2022-06-13 ENCOUNTER — OFFICE VISIT (OUTPATIENT)
Dept: FAMILY MEDICINE CLINIC | Facility: CLINIC | Age: 77
End: 2022-06-13

## 2022-06-13 VITALS
OXYGEN SATURATION: 93 % | SYSTOLIC BLOOD PRESSURE: 112 MMHG | HEIGHT: 70 IN | BODY MASS INDEX: 25.05 KG/M2 | WEIGHT: 175 LBS | DIASTOLIC BLOOD PRESSURE: 60 MMHG | HEART RATE: 77 BPM | TEMPERATURE: 97.5 F

## 2022-06-13 DIAGNOSIS — Z20.822 EXPOSURE TO COVID-19 VIRUS: Primary | ICD-10-CM

## 2022-06-13 DIAGNOSIS — M25.551 HIP PAIN, RIGHT: ICD-10-CM

## 2022-06-13 DIAGNOSIS — R49.0 HOARSENESS: ICD-10-CM

## 2022-06-13 LAB
EXPIRATION DATE: ABNORMAL
INTERNAL CONTROL: ABNORMAL
Lab: ABNORMAL
SARS-COV-2 AG UPPER RESP QL IA.RAPID: DETECTED

## 2022-06-13 PROCEDURE — 87426 SARSCOV CORONAVIRUS AG IA: CPT | Performed by: FAMILY MEDICINE

## 2022-06-13 PROCEDURE — 99214 OFFICE O/P EST MOD 30 MIN: CPT | Performed by: FAMILY MEDICINE

## 2022-06-13 RX ORDER — OMEGA-3 FATTY ACIDS/FISH OIL 300-1000MG
1 CAPSULE ORAL 2 TIMES DAILY
COMMUNITY

## 2022-06-13 RX ORDER — GABAPENTIN 300 MG/1
CAPSULE ORAL
Qty: 180 CAPSULE | Refills: 5 | Status: SHIPPED | OUTPATIENT
Start: 2022-06-13 | End: 2022-07-21 | Stop reason: SDUPTHER

## 2022-06-13 NOTE — PROGRESS NOTES
Chief Complaint  Hip Pain (Right hip pain off an on for a couple months.  States worse first in the morning.  Using heating pad which helps.  Gets better after up on it for a few hours) and Hoarse (Started today.  Wife positive 1 week ago today.  Patient tested negative 2022)    SUBJECTIVE  Dioni Macias presents to [facility]    Patient 76 years old on atorvastatin just developed hoarseness slight cough this morning wife was recently last week positive for COVID patient had a previous COVID test was negative COVID test done here today in the office is positive patient has right hip pain pain going from the hip down to the knee no weakness on the leg no fall no trauma patient sleeping on different mattress    PAST MEDICAL HISTORY  No Known Allergies     No past surgical history on file.    Social History     Tobacco Use   • Smoking status: Former Smoker     Packs/day: 1.00     Years: 30.00     Pack years: 30.00     Types: Cigarettes     Quit date:      Years since quittin.4   • Smokeless tobacco: Former User     Types: Chew, Snuff     Quit date:    Substance Use Topics   • Alcohol use: Yes     Alcohol/week: 2.0 standard drinks     Types: 2 Cans of beer per week       No family history on file.     Health Maintenance Due   Topic Date Due   • TDAP/TD VACCINES (1 - Tdap) Never done   • Pneumococcal Vaccine 65+ (2 - PPSV23 or PCV20) 2013   • ZOSTER VACCINE (2 of 2) 10/09/2018        Last Completed Colonoscopy     This patient has no relevant Health Maintenance data.          REVIEW OF SYSTEMS    Review of systems musculoskeletal pain from the right hip down to the knee after Elisha moving around for Lr having no pain no pain when he rolls over on the right side  Respiratory no shortness of breath or dyspnea on exertion slight cough  ENT considerable hoarseness    OBJECTIVE  Vitals:    22 1202   BP: 112/60   Pulse: 77   Temp: 97.5 °F (36.4 °C)  "  SpO2: 93%   Weight: 79.4 kg (175 lb)   Height: 177.8 cm (70\")     Body mass index is 25.11 kg/m².    PHYSICAL EXAM    General no distress  Lungs clear and equal bilaterally  Cardiovascular regular rhythm no murmur  Musculoskeletal good internal and external rotation of the right hip and left hip positive straight leg raise of the right leg    ASSESSMENT & PLAN  Diagnoses and all orders for this visit:    1. Exposure to COVID-19 virus (Primary)  -     POCT SARS-CoV-2 Antigen SVITLANA    2. Hip pain, right  -     gabapentin (NEURONTIN) 300 MG capsule; Take 1 po qhs x 3 days then 2 po qhs x 3 days then 1 po qam and 2 po qhs x 3 days then 2 po bid for 3 days then 2 po tid thereafter  Dispense: 180 capsule; Refill: 5  -     XR Spine Lumbar Complete 4+VW; Future    3. Hoarseness    Other orders  -     Nirmatrelvir & Ritonavir (PAXLOVID) 20 x 150 MG & 10 x 100MG tablet therapy pack tablet; Take 3 tablets by mouth 2 (Two) Times a Day for 5 days.  Dispense: 30 tablet; Refill: 0          Sciatica right side start Neurontin x-ray of the lumbosacral spine acetaminophen occasional Advil No. 2 positive COVID start back Slo-Bid and stop atorvastatin  Generalized anxiety depression doing reasonably well            Patient was given instructions and counseling regarding his condition or for health maintenance advice. Please see specific information pulled into the AVS if appropriate.   "

## 2022-06-20 ENCOUNTER — HOSPITAL ENCOUNTER (OUTPATIENT)
Dept: GENERAL RADIOLOGY | Facility: HOSPITAL | Age: 77
Discharge: HOME | End: 2022-06-20
Admitting: FAMILY MEDICINE

## 2022-06-20 DIAGNOSIS — M25.551 HIP PAIN, RIGHT: ICD-10-CM

## 2022-06-20 PROCEDURE — 72110 X-RAY EXAM L-2 SPINE 4/>VWS: CPT

## 2022-06-23 ENCOUNTER — TELEPHONE (OUTPATIENT)
Dept: FAMILY MEDICINE CLINIC | Facility: CLINIC | Age: 77
End: 2022-06-23

## 2022-06-23 NOTE — TELEPHONE ENCOUNTER
Caller: Dioni Macias    Relationship: Self    Best call back number: [phone]    Caller requesting test results: YES    What test was performed: XRAY    When was the test performed: Monday 6/20/22    Where was the test performed: [facility]    Additional notes:

## 2022-06-23 NOTE — TELEPHONE ENCOUNTER
Caller: Dioni Macias    Relationship: Self    Best call back number: [phone]    Caller requesting test results: SELF    What test was performed: XRAY     When was the test performed: 6/20/22    Where was the test performed: [facility]

## 2022-07-11 ENCOUNTER — TELEPHONE (OUTPATIENT)
Dept: FAMILY MEDICINE CLINIC | Facility: CLINIC | Age: 77
End: 2022-07-11

## 2022-07-11 NOTE — TELEPHONE ENCOUNTER
Caller: Dioni Macias    Relationship to patient: Self    Best call back number: [phone]    Patient is needing: PATIENT IS REQUESTING A CALL BACK FROM DR. HAAS. HE IS CURRENTLY IN SEVERE HIP PAIN. I TIRED TO SCHEDULE BUT HE JUST WANTS DR. HAAS TO CALL SO HE CAN GO OVER HIS MEDICATIONS. PLEASE CALL AND ADVISE.

## 2022-07-21 DIAGNOSIS — M25.551 HIP PAIN, RIGHT: ICD-10-CM

## 2022-07-21 RX ORDER — GABAPENTIN 300 MG/1
600 CAPSULE ORAL 3 TIMES DAILY
Qty: 180 CAPSULE | Refills: 5 | Status: SHIPPED | OUTPATIENT
Start: 2022-07-21 | End: 2023-01-16

## 2022-07-21 NOTE — TELEPHONE ENCOUNTER
Caller: Dioni Macias    Relationship: Self    Best call back number: [phone]    Requested Prescriptions:   Requested Prescriptions     Pending Prescriptions Disp Refills   • gabapentin (NEURONTIN) 300 MG capsule 180 capsule 5     Sig: Take 1 po qhs x 3 days then 2 po qhs x 3 days then 1 po qam and 2 po qhs x 3 days then 2 po bid for 3 days then 2 po tid thereafter        Pharmacy where request should be sent: [facility] [address] [place] [address] - [address] AT [facility] [address] - [phone] [facility] [phone] FX     Additional details provided by patient:      THE PATIENT SAID HE HAS 3 DAYS OF THIS MEDICATION LEFT BUT IS UNSURE IF PCP [facility] STILL WANT HIM TO TAKE THIS MEDICATION. HE SAID IF SO THEN SEND IT TO THE PHARMACY ABOVE. PATIENT WOULD LIKE A CALL TO DISCUSS       PLEASE CALL AND ADVISE       Does the patient have less than a 3 day supply:  [x] Yes  [] No    Hope Ana Weinre Rep   07/21/22 10:31 EDT

## 2022-09-21 ENCOUNTER — TELEPHONE (OUTPATIENT)
Dept: FAMILY MEDICINE CLINIC | Facility: CLINIC | Age: 77
End: 2022-09-21

## 2022-09-22 DIAGNOSIS — F03.90 DEMENTIA WITHOUT BEHAVIORAL DISTURBANCE, UNSPECIFIED DEMENTIA TYPE: Primary | ICD-10-CM

## 2022-09-27 ENCOUNTER — CLINICAL SUPPORT (OUTPATIENT)
Dept: FAMILY MEDICINE CLINIC | Facility: CLINIC | Age: 77
End: 2022-09-27

## 2022-09-27 DIAGNOSIS — Z23 NEED FOR COVID-19 VACCINE: Primary | ICD-10-CM

## 2022-09-27 DIAGNOSIS — Z23 NEED FOR INFLUENZA VACCINATION: ICD-10-CM

## 2022-09-27 PROCEDURE — 90662 IIV NO PRSV INCREASED AG IM: CPT | Performed by: FAMILY MEDICINE

## 2022-09-27 PROCEDURE — 91312 COVID-19 (PFIZER) BIVALENT BOOSTER 12+YRS: CPT | Performed by: FAMILY MEDICINE

## 2022-09-27 PROCEDURE — G0008 ADMIN INFLUENZA VIRUS VAC: HCPCS | Performed by: FAMILY MEDICINE

## 2022-09-27 PROCEDURE — 0124A PR ADM SARSCOV2 30MCG/0.3ML BST: CPT | Performed by: FAMILY MEDICINE

## 2022-10-25 ENCOUNTER — TELEPHONE (OUTPATIENT)
Dept: FAMILY MEDICINE CLINIC | Facility: CLINIC | Age: 77
End: 2022-10-25

## 2022-10-25 NOTE — TELEPHONE ENCOUNTER
Caller: Dioni Macias    Relationship: Self    Best call back number: [phone] OR  [phone]  Who are you requesting to speak with (clinical staff, provider,  specific staff member): CLINCIAL    What was the call regarding: PATIENT STATES HE HAD BLOOD WORK AT [facility] AND HE HAS LOW B12. PATIENT STATES HE WOULD LIKE TO KNOW WHAT DR HAAS RECOMMENDS.    Do you require a callback: YES

## 2022-10-25 NOTE — TELEPHONE ENCOUNTER
Called and spoke to patient instructed needs to start B-12 injections.   Will need to come to the office 1 X per week for 3 weeks. Then once monthly.  Instructed can also instruct patient how to give injections at home to himself if he wants to do that.  Patient understands and will come to the office tomorrow to start injections.

## 2022-10-26 ENCOUNTER — CLINICAL SUPPORT (OUTPATIENT)
Dept: FAMILY MEDICINE CLINIC | Facility: CLINIC | Age: 77
End: 2022-10-26

## 2022-10-26 DIAGNOSIS — E53.8 VITAMIN B 12 DEFICIENCY: Primary | ICD-10-CM

## 2022-10-26 PROCEDURE — 96372 THER/PROPH/DIAG INJ SC/IM: CPT | Performed by: FAMILY MEDICINE

## 2022-10-26 RX ORDER — CYANOCOBALAMIN 1000 UG/ML
1000 INJECTION, SOLUTION INTRAMUSCULAR; SUBCUTANEOUS
Status: COMPLETED | OUTPATIENT
Start: 2022-10-26 | End: 2022-11-09

## 2022-10-26 RX ORDER — CYANOCOBALAMIN 1000 UG/ML
1000 INJECTION, SOLUTION INTRAMUSCULAR; SUBCUTANEOUS
Status: SHIPPED | OUTPATIENT
Start: 2022-12-07

## 2022-10-26 RX ADMIN — CYANOCOBALAMIN 1000 MCG: 1000 INJECTION, SOLUTION INTRAMUSCULAR; SUBCUTANEOUS at 15:10

## 2022-11-02 ENCOUNTER — CLINICAL SUPPORT (OUTPATIENT)
Dept: FAMILY MEDICINE CLINIC | Facility: CLINIC | Age: 77
End: 2022-11-02

## 2022-11-02 DIAGNOSIS — D51.9 ANEMIA DUE TO VITAMIN B12 DEFICIENCY, UNSPECIFIED B12 DEFICIENCY TYPE: ICD-10-CM

## 2022-11-02 PROCEDURE — 96372 THER/PROPH/DIAG INJ SC/IM: CPT | Performed by: FAMILY MEDICINE

## 2022-11-02 RX ADMIN — CYANOCOBALAMIN 1000 MCG: 1000 INJECTION, SOLUTION INTRAMUSCULAR; SUBCUTANEOUS at 10:02

## 2022-11-09 ENCOUNTER — CLINICAL SUPPORT (OUTPATIENT)
Dept: FAMILY MEDICINE CLINIC | Facility: CLINIC | Age: 77
End: 2022-11-09

## 2022-11-09 DIAGNOSIS — D51.9 ANEMIA DUE TO VITAMIN B12 DEFICIENCY, UNSPECIFIED B12 DEFICIENCY TYPE: ICD-10-CM

## 2022-11-09 PROCEDURE — 96372 THER/PROPH/DIAG INJ SC/IM: CPT | Performed by: FAMILY MEDICINE

## 2022-11-09 RX ADMIN — CYANOCOBALAMIN 1000 MCG: 1000 INJECTION, SOLUTION INTRAMUSCULAR; SUBCUTANEOUS at 09:08

## 2022-12-08 ENCOUNTER — CLINICAL SUPPORT (OUTPATIENT)
Dept: FAMILY MEDICINE CLINIC | Facility: CLINIC | Age: 77
End: 2022-12-08

## 2022-12-08 DIAGNOSIS — E53.8 VITAMIN B 12 DEFICIENCY: Primary | ICD-10-CM

## 2022-12-08 PROCEDURE — 96372 THER/PROPH/DIAG INJ SC/IM: CPT | Performed by: NURSE PRACTITIONER

## 2022-12-08 RX ADMIN — CYANOCOBALAMIN 1000 MCG: 1000 INJECTION, SOLUTION INTRAMUSCULAR; SUBCUTANEOUS at 09:45

## 2023-01-10 ENCOUNTER — CLINICAL SUPPORT (OUTPATIENT)
Dept: FAMILY MEDICINE CLINIC | Facility: CLINIC | Age: 78
End: 2023-01-10
Payer: MEDICARE

## 2023-01-10 DIAGNOSIS — E53.8 VITAMIN B 12 DEFICIENCY: Primary | ICD-10-CM

## 2023-01-10 PROCEDURE — 96372 THER/PROPH/DIAG INJ SC/IM: CPT | Performed by: FAMILY MEDICINE

## 2023-01-10 RX ADMIN — CYANOCOBALAMIN 1000 MCG: 1000 INJECTION, SOLUTION INTRAMUSCULAR; SUBCUTANEOUS at 12:51

## 2023-01-16 DIAGNOSIS — M25.551 HIP PAIN, RIGHT: ICD-10-CM

## 2023-01-17 RX ORDER — GABAPENTIN 300 MG/1
CAPSULE ORAL
Qty: 180 CAPSULE | Refills: 0 | Status: SHIPPED | OUTPATIENT
Start: 2023-01-17 | End: 2023-02-07 | Stop reason: SDUPTHER

## 2023-01-26 ENCOUNTER — TELEPHONE (OUTPATIENT)
Dept: FAMILY MEDICINE CLINIC | Facility: CLINIC | Age: 78
End: 2023-01-26
Payer: MEDICARE

## 2023-01-26 NOTE — TELEPHONE ENCOUNTER
Talked with patient instructed needs appointment to follow up Gabapentin.  Appt made for 02/07/2023

## 2023-01-26 NOTE — TELEPHONE ENCOUNTER
Caller: Dioni Macias    Relationship: Self    Best call back number: [phone]    What is the best time to reach you: ANY    Who are you requesting to speak with (clinical staff, provider,  specific staff member): CLINICAL    What was the call regarding: PATIENT STATED THAT HE WAS UNDER THE IMPRESSION THAT HE WAS TO STOP TAKING GABAPENTIN AS OF 1/23/23.  HIS LAST BOTTLE HAD NO REFILLS AND THE GABAPENTIN  WAS FILLED AT [facility] 1/17/23. PLEASE CALL AND ADVISE.     Do you require a callback: YES

## 2023-02-07 ENCOUNTER — OFFICE VISIT (OUTPATIENT)
Dept: FAMILY MEDICINE CLINIC | Facility: CLINIC | Age: 78
End: 2023-02-07
Payer: MEDICARE

## 2023-02-07 VITALS
WEIGHT: 178 LBS | BODY MASS INDEX: 25.48 KG/M2 | HEART RATE: 76 BPM | OXYGEN SATURATION: 97 % | SYSTOLIC BLOOD PRESSURE: 112 MMHG | TEMPERATURE: 97.3 F | DIASTOLIC BLOOD PRESSURE: 64 MMHG | HEIGHT: 70 IN

## 2023-02-07 DIAGNOSIS — M54.30 SCIATIC NERVE PAIN, UNSPECIFIED LATERALITY: ICD-10-CM

## 2023-02-07 DIAGNOSIS — M25.551 HIP PAIN, RIGHT: ICD-10-CM

## 2023-02-07 DIAGNOSIS — R05.1 ACUTE COUGH: ICD-10-CM

## 2023-02-07 DIAGNOSIS — D51.9 ANEMIA DUE TO VITAMIN B12 DEFICIENCY, UNSPECIFIED B12 DEFICIENCY TYPE: Primary | ICD-10-CM

## 2023-02-07 DIAGNOSIS — Z79.899 MEDICATION MANAGEMENT: ICD-10-CM

## 2023-02-07 LAB
AMPHET+METHAMPHET UR QL: NEGATIVE
AMPHETAMINE INTERNAL CONTROL: NORMAL
AMPHETAMINES UR QL: NEGATIVE
BARBITURATE INTERNAL CONTROL: NORMAL
BARBITURATES UR QL SCN: NEGATIVE
BENZODIAZ UR QL SCN: NEGATIVE
BENZODIAZEPINE INTERNAL CONTROL: NORMAL
BUPRENORPHINE INTERNAL CONTROL: NORMAL
BUPRENORPHINE SERPL-MCNC: NEGATIVE NG/ML
CANNABINOIDS SERPL QL: NEGATIVE
COCAINE INTERNAL CONTROL: NORMAL
COCAINE UR QL: NEGATIVE
EXPIRATION DATE: NORMAL
EXPIRATION DATE: NORMAL
FLUAV AG UPPER RESP QL IA.RAPID: NOT DETECTED
FLUBV AG UPPER RESP QL IA.RAPID: NOT DETECTED
INTERNAL CONTROL: NORMAL
Lab: NORMAL
Lab: NORMAL
MDMA (ECSTASY) INTERNAL CONTROL: NORMAL
MDMA UR QL SCN: NEGATIVE
METHADONE INTERNAL CONTROL: NORMAL
METHADONE UR QL SCN: NEGATIVE
METHAMPHETAMINE INTERNAL CONTROL: NORMAL
OPIATES INTERNAL CONTROL: NORMAL
OPIATES UR QL: NEGATIVE
OXYCODONE INTERNAL CONTROL: NORMAL
OXYCODONE UR QL SCN: NEGATIVE
PCP UR QL SCN: NEGATIVE
PHENCYCLIDINE INTERNAL CONTROL: NORMAL
SARS-COV-2 AG UPPER RESP QL IA.RAPID: NOT DETECTED
THC INTERNAL CONTROL: NORMAL

## 2023-02-07 PROCEDURE — 99214 OFFICE O/P EST MOD 30 MIN: CPT | Performed by: FAMILY MEDICINE

## 2023-02-07 PROCEDURE — 80305 DRUG TEST PRSMV DIR OPT OBS: CPT | Performed by: FAMILY MEDICINE

## 2023-02-07 PROCEDURE — 96372 THER/PROPH/DIAG INJ SC/IM: CPT | Performed by: FAMILY MEDICINE

## 2023-02-07 PROCEDURE — 87428 SARSCOV & INF VIR A&B AG IA: CPT | Performed by: FAMILY MEDICINE

## 2023-02-07 RX ORDER — GABAPENTIN 300 MG/1
600 CAPSULE ORAL 3 TIMES DAILY
Qty: 180 CAPSULE | Refills: 5 | Status: SHIPPED | OUTPATIENT
Start: 2023-02-07

## 2023-02-07 RX ORDER — DONEPEZIL HYDROCHLORIDE 5 MG/1
5 TABLET, FILM COATED ORAL DAILY
COMMUNITY
Start: 2023-01-28

## 2023-02-07 RX ADMIN — CYANOCOBALAMIN 1000 MCG: 1000 INJECTION, SOLUTION INTRAMUSCULAR; SUBCUTANEOUS at 11:49

## 2023-02-07 NOTE — PROGRESS NOTES
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    Social History     Tobacco Use   • Smoking status: Former     Packs/day: 1.00     Years: 30.00     Pack years: 30.00     Types: Cigarettes     Quit date:      Years since quittin.1   • Smokeless tobacco: Former     Types: Chew, Snuff     Quit date:    Substance Use Topics   • Alcohol use: Yes     Alcohol/week: 2.0 standard drinks     Types: 2 Cans of beer per week       No family history on file.     Health Maintenance Due   Topic Date Due   • TDAP/TD VACCINES (1 - Tdap) Never done   • Pneumococcal Vaccine 65+ (2 - PPSV23 if available, else PCV20) 2013   • ZOSTER VACCINE (2 of 2) 10/09/2018        Last Completed Colonoscopy     This patient has no relevant Health Maintenance data.          REVIEW OF SYSTEMS    Lungs no shortness of breath no dyspnea exertion  Cardiovascular no chest pain no palpitations  Neurologic does take Aricept is due to see the neurologist shortly can continue did not go off the B12    OBJECTIVE  Vitals:    23 1148   BP: 112/64   Pulse: 76   Temp: 97.3 °F (36.3 °C)   SpO2: 97%   Weight: 80.7 kg (178 lb)   Height: 177.8 cm (70\")     Body mass index is 25.54 kg/m².    PHYSICAL EXAM    General no distress  Lungs clear and equal bilaterally  Cardiovascular regular rhythm no murmur  Neurologic mentation is normal speech is normal gait is normal  Skin patient has 1 actinic keratosis on his right face and 2 on his right arm   ASSESSMENT & PLAN  Diagnoses and all orders for this visit:    1. Anemia due " to vitamin B12 deficiency, unspecified B12 deficiency type (Primary)    2. Hip pain, right  -     gabapentin (NEURONTIN) 300 MG capsule; Take 2 capsules by mouth 3 (Three) Times a Day.  Dispense: 180 capsule; Refill: 5    3. Sciatic nerve pain, unspecified laterality  -     POC Urine Drug Screen [facility]    4. Medication management  -     POC Urine Drug Screen [facility]    5. Acute cough  -     POCT SARS-CoV-2 Antigen SVITLANA + Flu          Viral URI skin to actinic keratoses arm 1 right face takes Aricept for mild cognitive impairment versus dementia continue sciatica continue Neurontin            Patient was given instructions and counseling regarding his condition or for health maintenance advice. Please see specific information pulled into the AVS if appropriate.

## 2023-03-07 ENCOUNTER — CLINICAL SUPPORT (OUTPATIENT)
Dept: FAMILY MEDICINE CLINIC | Facility: CLINIC | Age: 78
End: 2023-03-07
Payer: COMMERCIAL

## 2023-03-07 DIAGNOSIS — E53.8 B12 DEFICIENCY: ICD-10-CM

## 2023-03-07 PROCEDURE — 96372 THER/PROPH/DIAG INJ SC/IM: CPT | Performed by: FAMILY MEDICINE

## 2023-03-07 RX ADMIN — CYANOCOBALAMIN 1000 MCG: 1000 INJECTION, SOLUTION INTRAMUSCULAR; SUBCUTANEOUS at 10:27

## 2023-04-04 ENCOUNTER — CLINICAL SUPPORT (OUTPATIENT)
Dept: FAMILY MEDICINE CLINIC | Facility: CLINIC | Age: 78
End: 2023-04-04
Payer: MEDICARE

## 2023-04-04 DIAGNOSIS — D51.9 ANEMIA DUE TO VITAMIN B12 DEFICIENCY, UNSPECIFIED B12 DEFICIENCY TYPE: Primary | ICD-10-CM

## 2023-04-04 RX ADMIN — CYANOCOBALAMIN 1000 MCG: 1000 INJECTION, SOLUTION INTRAMUSCULAR; SUBCUTANEOUS at 10:46

## 2023-04-04 NOTE — PROGRESS NOTES
Injection  b-12 Injection performed in left arm sq by Kirsten Wong. Patient tolerated the procedure well without complications.  04/04/23   Kirsten Wong

## 2023-04-19 ENCOUNTER — PROCEDURE VISIT (OUTPATIENT)
Dept: FAMILY MEDICINE CLINIC | Facility: CLINIC | Age: 78
End: 2023-04-19
Payer: MEDICARE

## 2023-04-19 VITALS
BODY MASS INDEX: 25.48 KG/M2 | SYSTOLIC BLOOD PRESSURE: 110 MMHG | HEIGHT: 70 IN | TEMPERATURE: 97.1 F | HEART RATE: 62 BPM | OXYGEN SATURATION: 94 % | WEIGHT: 178 LBS | DIASTOLIC BLOOD PRESSURE: 62 MMHG

## 2023-04-19 DIAGNOSIS — L57.0 AK (ACTINIC KERATOSIS): Primary | ICD-10-CM

## 2023-04-19 NOTE — PROGRESS NOTES
Chief Complaint  Actinic Keratosis (Here for cryo of AK on right face and 2 on right arm.)    SUBJECTIVE  Dioni Macias presents to [facility]    Patient is a 77-year-old has 3 actinic keratoses on his right forearm and wrist cryosurgery    PAST MEDICAL HISTORY  No Known Allergies     No past surgical history on file.    Social History     Tobacco Use   • Smoking status: Former     Packs/day: 1.00     Years: 30.00     Pack years: 30.00     Types: Cigarettes     Quit date:      Years since quittin.3   • Smokeless tobacco: Former     Types: Chew, Snuff     Quit date:    Substance Use Topics   • Alcohol use: Yes     Alcohol/week: 2.0 standard drinks     Types: 2 Cans of beer per week       No family history on file.     Health Maintenance Due   Topic Date Due   • TDAP/TD VACCINES (1 - Tdap) Never done   • Pneumococcal Vaccine 65+ (2 - PPSV23 if available, else PCV20) 2013   • ZOSTER VACCINE (2 of 2) 10/09/2018        Last Completed Colonoscopy     This patient has no relevant Health Maintenance data.          REVIEW OF SYSTEMS    Skin irritated places right forearm and wrist 3 places on the right forearm    OBJECTIVE  There were no vitals filed for this visit.  There is no height or weight on file to calculate BMI.    PHYSICAL EXAM    General no distress cardiovascular regular rhythm no murmur  Respiratory lungs clear and equal bilaterally  Skin 3 actinic keratoses about 1 cm right forearm and wrist 2 on the right forearm and 1 on the right wrist    ASSESSMENT & PLAN  Diagnoses and all orders for this visit:    1. AK (actinic keratosis) (Primary)          Cryosurgery to actinic keratoses right forearm and cryosurgery 1 actinic keratoses right wrist    Cryotherapy, Skin Lesion    Date/Time: 2023 10:04 AM  Performed by: Ubaldo Casillas III, MD  Authorized by: Ubaldo Casillas III, MD   Preparation: Patient was prepped and draped in the usual sterile  fashion.  Local anesthesia used: no    Anesthesia:  Local anesthesia used: no    Sedation:  Patient sedated: no    Patient tolerance: patient tolerated the procedure well with no immediate complications             Patient was given instructions and counseling regarding his condition or for health maintenance advice. Please see specific information pulled into the AVS if appropriate.

## 2023-04-28 ENCOUNTER — OFFICE VISIT (OUTPATIENT)
Dept: FAMILY MEDICINE CLINIC | Facility: CLINIC | Age: 78
End: 2023-04-28
Payer: MEDICARE

## 2023-04-28 VITALS
HEART RATE: 63 BPM | WEIGHT: 169.8 LBS | BODY MASS INDEX: 24.31 KG/M2 | DIASTOLIC BLOOD PRESSURE: 83 MMHG | TEMPERATURE: 97 F | HEIGHT: 70 IN | SYSTOLIC BLOOD PRESSURE: 122 MMHG | OXYGEN SATURATION: 92 %

## 2023-04-28 DIAGNOSIS — A09 DIARRHEA OF INFECTIOUS ORIGIN: Primary | ICD-10-CM

## 2023-04-28 LAB
DEPRECATED RDW RBC AUTO: 43.1 FL (ref 37–54)
ERYTHROCYTE [DISTWIDTH] IN BLOOD BY AUTOMATED COUNT: 12.8 % (ref 12.3–15.4)
HCT VFR BLD AUTO: 45.4 % (ref 37.5–51)
HGB BLD-MCNC: 16.2 G/DL (ref 13–17.7)
MCH RBC QN AUTO: 33.2 PG (ref 26.6–33)
MCHC RBC AUTO-ENTMCNC: 35.7 G/DL (ref 31.5–35.7)
MCV RBC AUTO: 93 FL (ref 79–97)
PLATELET # BLD AUTO: 212 10*3/MM3 (ref 140–450)
PMV BLD AUTO: 11.1 FL (ref 6–12)
RBC # BLD AUTO: 4.88 10*6/MM3 (ref 4.14–5.8)
WBC NRBC COR # BLD: 9.32 10*3/MM3 (ref 3.4–10.8)

## 2023-04-28 PROCEDURE — 99213 OFFICE O/P EST LOW 20 MIN: CPT | Performed by: FAMILY MEDICINE

## 2023-04-28 PROCEDURE — 3074F SYST BP LT 130 MM HG: CPT | Performed by: FAMILY MEDICINE

## 2023-04-28 PROCEDURE — 3079F DIAST BP 80-89 MM HG: CPT | Performed by: FAMILY MEDICINE

## 2023-04-28 PROCEDURE — 85027 COMPLETE CBC AUTOMATED: CPT | Performed by: FAMILY MEDICINE

## 2023-04-28 PROCEDURE — 80048 BASIC METABOLIC PNL TOTAL CA: CPT | Performed by: FAMILY MEDICINE

## 2023-04-28 PROCEDURE — 36415 COLL VENOUS BLD VENIPUNCTURE: CPT | Performed by: FAMILY MEDICINE

## 2023-04-28 RX ORDER — CHLORAL HYDRATE 500 MG
CAPSULE ORAL DAILY
COMMUNITY

## 2023-04-28 NOTE — PROGRESS NOTES
Chief Complaint   Patient presents with   • Dehydration        Subjective     Dioni Macias  has no past medical history on file.    Diarrhea- he has had diarrhea now for the last 4 days.  He denies any nausea vomiting abdominal cramping or blood per rectum.  He has had several watery bowel movements a day.  He is felt weak and lethargic.  He has been attempting to drink fluids best he can.      PHQ-2 Depression Screening  Little interest or pleasure in doing things?     Feeling down, depressed, or hopeless?     PHQ-2 Total Score     PHQ-9 Depression Screening  Little interest or pleasure in doing things?     Feeling down, depressed, or hopeless?     Trouble falling or staying asleep, or sleeping too much?     Feeling tired or having little energy?     Poor appetite or overeating?     Feeling bad about yourself - or that you are a failure or have let yourself or your family down?     Trouble concentrating on things, such as reading the newspaper or watching television?     Moving or speaking so slowly that other people could have noticed? Or the opposite - being so fidgety or restless that you have been moving around a lot more than usual?     Thoughts that you would be better off dead, or of hurting yourself in some way?     PHQ-9 Total Score     If you checked off any problems, how difficult have these problems made it for you to do your work, take care of things at home, or get along with other people?       No Known Allergies    Prior to Admission medications    Medication Sig Start Date End Date Taking? Authorizing Provider   Acetaminophen (Tylenol) 325 MG capsule Take  by mouth.   Yes Provider, MD Елена   atorvastatin (LIPITOR) 40 MG tablet Take 1 tablet by mouth every night at bedtime. 4/20/22  Yes Ubaldo Casillas III, MD   donepezil (ARICEPT) 5 MG tablet Take 1 tablet by mouth Daily. 1/28/23  Yes Zia Cunningham MD   DULoxetine (CYMBALTA) 60 MG capsule Take 1 capsule by mouth Daily. 4/20/22  Yes  Ubaldo Casillas III, MD   gabapentin (NEURONTIN) 300 MG capsule Take 2 capsules by mouth 3 (Three) Times a Day. 23  Yes Ubaldo Casillas III, MD   Ibuprofen 200 MG capsule Take 1 capsule by mouth 2 (Two) Times a Day.   Yes Елена Marquez MD   metoprolol succinate XL (TOPROL-XL) 25 MG 24 hr tablet Take 0.5 tablets by mouth Daily. 22  Yes Ubaldo Caslilas III, MD   Omega-3 Fatty Acids (FISH OIL CONCENTRATE PO) Take  by mouth.   Yes Елена Marquez MD   Omega-3 Fatty Acids (fish oil) 1000 MG capsule capsule Take  by mouth Daily.   Yes Елена Marquez MD        Patient Active Problem List   Diagnosis   • Depression, major, recurrent, mild   • Diverticulosis of colon   • Gastric reflux   • High blood pressure   • Other and unspecified hyperlipidemia   • Mixed hyperlipidemia   • Anemia due to vitamin B12 deficiency   • Diarrhea of infectious origin        History reviewed. No pertinent surgical history.    Social History     Socioeconomic History   • Marital status:    Tobacco Use   • Smoking status: Former     Packs/day: 1.00     Years: 30.00     Pack years: 30.00     Types: Cigarettes     Quit date:      Years since quittin.3   • Smokeless tobacco: Former     Types: Chew, Snuff     Quit date:    Vaping Use   • Vaping Use: Never used   Substance and Sexual Activity   • Alcohol use: Yes     Alcohol/week: 2.0 standard drinks     Types: 2 Cans of beer per week   • Drug use: Never   • Sexual activity: Defer       History reviewed. No pertinent family history.    Family history, surgical history, past medical history, Allergies and meds reviewed with patient today and updated in [facility] EMR.     ROS:  Review of Systems   Constitutional: Positive for appetite change and fatigue. Negative for chills and fever.   Gastrointestinal: Positive for diarrhea. Negative for abdominal pain, blood in stool, constipation, nausea and vomiting.   Genitourinary: Negative for difficulty urinating and  "frequency.       OBJECTIVE:  Vitals:    04/28/23 1545   BP: 122/83   BP Location: Right arm   Patient Position: Sitting   Pulse: 63   Temp: 97 °F (36.1 °C)   SpO2: 92%   Weight: 77 kg (169 lb 12.8 oz)   Height: 177.8 cm (70\")     No results found.   Body mass index is 24.36 kg/m².  No LMP for male patient.    Physical Exam  Vitals and nursing note reviewed.   Constitutional:       General: He is not in acute distress.     Appearance: Normal appearance. He is normal weight.   HENT:      Head: Normocephalic.   Abdominal:      General: Abdomen is flat. Bowel sounds are normal.      Palpations: Abdomen is soft. There is no mass.      Tenderness: There is no abdominal tenderness. There is no right CVA tenderness or left CVA tenderness.   Neurological:      Mental Status: He is alert.         IV Infusion Greater Than 1hr    Date/Time: 4/28/2023 5:10 PM  Performed by: Zia Rivers DO  Authorized by: Zia Rivers DO   Preparation: Patient was prepped and draped in the usual sterile fashion.  Local anesthesia used: no    Anesthesia:  Local anesthesia used: no    Sedation:  Patient sedated: no    Patient tolerance: patient tolerated the procedure well with no immediate complications          No visits with results within 30 Day(s) from this visit.   Latest known visit with results is:   Office Visit on 02/07/2023   Component Date Value Ref Range Status   • Amphetamine Screen, Urine 02/07/2023 Negative  Negative Final   • AMP INTERNAL CONTROL 02/07/2023 Passed  Passed Final   • Barbiturates Screen, Urine 02/07/2023 Negative  Negative Final   • BARBITURATE INTERNAL CONTROL 02/07/2023 Passed  Passed Final   • Buprenorphine, Screen, Urine 02/07/2023 Negative  Negative Final   • BUPRENORPHINE INTERNAL CONTROL 02/07/2023 Passed  Passed Final   • Benzodiazepine Screen, Urine 02/07/2023 Negative  Negative Final   • BENZODIAZEPINE INTERNAL CONTROL 02/07/2023 Passed  Passed Final   • Cocaine Screen, Urine " 02/07/2023 Negative  Negative Final   • COCAINE INTERNAL CONTROL 02/07/2023 Passed  Passed Final   • MDMA (ECSTASY) 02/07/2023 Negative  Negative Final   • MDMA (ECSTASY) INTERNAL CONTROL 02/07/2023 Passed  Passed Final   • Methamphetamine, Ur 02/07/2023 Negative  Negative Final   • METHAMPHETAMINE INTERNAL CONTROL 02/07/2023 Passed  Passed Final   • Methadone Screen, Urine 02/07/2023 Negative  Negative Final   • METHADONE INTERNAL CONTROL 02/07/2023 Passed  Passed Final   • Opiate Screen 02/07/2023 Negative  Negative Final   • OPIATES INTERNAL CONTROL 02/07/2023 Passed  Passed Final   • Oxycodone Screen, Urine 02/07/2023 Negative  Negative Final   • OXYCODONE INTERNAL CONTROL 02/07/2023 Passed  Passed Final   • Phencyclidine (PCP), Urine 02/07/2023 Negative  Negative Final   • PHENCYCLIDINE INTERNAL CONTROL 02/07/2023 Passed  Passed Final   • THC, Screen, Urine 02/07/2023 Negative  Negative Final   • THC INTERNAL CONTROL 02/07/2023 Passed  Passed Final   • Lot Number 02/07/2023 [id number]   Final   • Expiration Date 02/07/2023 03/14/2024   Final   • SARS Antigen 02/07/2023 Not Detected  Not Detected, Presumptive Negative Final   • Influenza A Antigen SVITLANA 02/07/2023 Not Detected  Not Detected Final   • Influenza B Antigen SVITLANA 02/07/2023 Not Detected  Not Detected Final   • Internal Control 02/07/2023 Passed  Passed Final   • Lot Number 02/07/2023 [id number]   Final   • Expiration Date 02/07/2023 12/06/2023   Final       ASSESSMENT/ PLAN:    Diagnoses and all orders for this visit:    1. Diarrhea of infectious origin (Primary)  Assessment & Plan:  We will get a CBC and BMP.  We will attempt to give him some IV fluids.  I think if he gets hydrated his diarrhea will be self resolving.    Orders:  -     CBC (No Diff)  -     Basic Metabolic Panel  -     IV Infusion Greater Than 1hr  -     lactated ringers bolus 500 mL      Orders Placed Today:     New Medications Ordered This Visit   Medications   • lactated ringers bolus  500 mL     1.5 L        Management Plan:     An After Visit Summary was printed and given to the patient at discharge.    Follow-up: Return if symptoms worsen or fail to improve.    Zia Rivers DO 4/28/2023 17:10 EDT  This note was electronically signed.

## 2023-04-28 NOTE — ASSESSMENT & PLAN NOTE
We will get a CBC and BMP.  We will attempt to give him some IV fluids.  I think if he gets hydrated his diarrhea will be self resolving.

## 2023-04-29 LAB
ANION GAP SERPL CALCULATED.3IONS-SCNC: 15 MMOL/L (ref 5–15)
BUN SERPL-MCNC: 18 MG/DL (ref 8–23)
BUN/CREAT SERPL: 16.5 (ref 7–25)
CALCIUM SPEC-SCNC: 9.2 MG/DL (ref 8.6–10.5)
CHLORIDE SERPL-SCNC: 100 MMOL/L (ref 98–107)
CO2 SERPL-SCNC: 22 MMOL/L (ref 22–29)
CREAT SERPL-MCNC: 1.09 MG/DL (ref 0.76–1.27)
EGFRCR SERPLBLD CKD-EPI 2021: 69 ML/MIN/1.73
GLUCOSE SERPL-MCNC: 116 MG/DL (ref 65–99)
POTASSIUM SERPL-SCNC: 4.2 MMOL/L (ref 3.5–5.2)
SODIUM SERPL-SCNC: 137 MMOL/L (ref 136–145)

## 2023-05-02 ENCOUNTER — CLINICAL SUPPORT (OUTPATIENT)
Dept: FAMILY MEDICINE CLINIC | Facility: CLINIC | Age: 78
End: 2023-05-02
Payer: MEDICARE

## 2023-05-02 DIAGNOSIS — E53.8 B12 DEFICIENCY: Primary | ICD-10-CM

## 2023-05-02 RX ADMIN — CYANOCOBALAMIN 1000 MCG: 1000 INJECTION, SOLUTION INTRAMUSCULAR; SUBCUTANEOUS at 10:26

## 2023-05-02 NOTE — PROGRESS NOTES
Injection  Injection performed in left arm by Amira Lopez. Patient tolerated the procedure well without complications.  05/02/23   Amira Lopez

## 2023-06-03 DIAGNOSIS — F33.0 DEPRESSION, MAJOR, RECURRENT, MILD: ICD-10-CM

## 2023-06-05 RX ORDER — DULOXETIN HYDROCHLORIDE 60 MG/1
CAPSULE, DELAYED RELEASE ORAL
Qty: 90 CAPSULE | Refills: 3 | Status: SHIPPED | OUTPATIENT
Start: 2023-06-05

## 2023-06-06 ENCOUNTER — CLINICAL SUPPORT (OUTPATIENT)
Dept: FAMILY MEDICINE CLINIC | Facility: CLINIC | Age: 78
End: 2023-06-06
Payer: MEDICARE

## 2023-06-06 DIAGNOSIS — D51.9 ANEMIA DUE TO VITAMIN B12 DEFICIENCY, UNSPECIFIED B12 DEFICIENCY TYPE: Primary | ICD-10-CM

## 2023-06-06 RX ADMIN — CYANOCOBALAMIN 1000 MCG: 1000 INJECTION, SOLUTION INTRAMUSCULAR; SUBCUTANEOUS at 13:48

## 2023-06-06 NOTE — PROGRESS NOTES
Injection  Injection performed in right arm by Amira Lopez. Patient tolerated the procedure well without complications.  06/06/23   Amira Lopez

## 2023-08-02 ENCOUNTER — CLINICAL SUPPORT (OUTPATIENT)
Dept: FAMILY MEDICINE CLINIC | Facility: CLINIC | Age: 78
End: 2023-08-02
Payer: COMMERCIAL

## 2023-08-02 DIAGNOSIS — E53.8 VITAMIN B12 DEFICIENCY: Primary | ICD-10-CM

## 2023-08-24 DIAGNOSIS — Z79.899 MEDICATION MANAGEMENT: Primary | ICD-10-CM

## 2023-08-24 DIAGNOSIS — M25.551 HIP PAIN, RIGHT: ICD-10-CM

## 2023-08-28 RX ORDER — GABAPENTIN 300 MG/1
CAPSULE ORAL
Qty: 180 CAPSULE | Refills: 0 | Status: SHIPPED | OUTPATIENT
Start: 2023-08-28

## 2023-09-05 ENCOUNTER — CLINICAL SUPPORT (OUTPATIENT)
Dept: FAMILY MEDICINE CLINIC | Facility: CLINIC | Age: 78
End: 2023-09-05
Payer: MEDICARE

## 2023-09-05 DIAGNOSIS — D51.9 ANEMIA DUE TO VITAMIN B12 DEFICIENCY, UNSPECIFIED B12 DEFICIENCY TYPE: Primary | ICD-10-CM

## 2023-09-05 NOTE — PROGRESS NOTES
Injection  Injection performed in left arm by Amira Lopez. Patient tolerated the procedure well without complications.  09/05/23   Amira Lopez

## 2023-09-19 ENCOUNTER — OFFICE VISIT (OUTPATIENT)
Dept: FAMILY MEDICINE CLINIC | Facility: CLINIC | Age: 78
End: 2023-09-19
Payer: MEDICARE

## 2023-09-19 VITALS
SYSTOLIC BLOOD PRESSURE: 121 MMHG | HEART RATE: 62 BPM | HEIGHT: 70 IN | BODY MASS INDEX: 24.82 KG/M2 | OXYGEN SATURATION: 97 % | DIASTOLIC BLOOD PRESSURE: 57 MMHG | WEIGHT: 173.38 LBS | TEMPERATURE: 97 F

## 2023-09-19 DIAGNOSIS — Z76.89 ENCOUNTER TO ESTABLISH CARE: Primary | ICD-10-CM

## 2023-09-19 DIAGNOSIS — E74.89 OTHER SPECIFIED DISORDERS OF CARBOHYDRATE METABOLISM: ICD-10-CM

## 2023-09-19 DIAGNOSIS — I10 PRIMARY HYPERTENSION: ICD-10-CM

## 2023-09-19 DIAGNOSIS — D51.9 ANEMIA DUE TO VITAMIN B12 DEFICIENCY, UNSPECIFIED B12 DEFICIENCY TYPE: ICD-10-CM

## 2023-09-19 DIAGNOSIS — E78.5 HYPERLIPIDEMIA, UNSPECIFIED HYPERLIPIDEMIA TYPE: ICD-10-CM

## 2023-09-19 DIAGNOSIS — R42 DIZZINESS: ICD-10-CM

## 2023-09-19 PROBLEM — Z79.899 ENCOUNTER FOR LONG-TERM (CURRENT) USE OF OTHER MEDICATIONS: Status: RESOLVED | Noted: 2019-12-10 | Resolved: 2023-09-19

## 2023-09-19 PROBLEM — A09 DIARRHEA OF INFECTIOUS ORIGIN: Status: RESOLVED | Noted: 2023-04-28 | Resolved: 2023-09-19

## 2023-09-19 PROBLEM — Z79.899 ENCOUNTER FOR LONG-TERM (CURRENT) USE OF OTHER MEDICATIONS: Status: ACTIVE | Noted: 2019-12-10

## 2023-09-19 PROBLEM — F32.A DEPRESSION: Status: ACTIVE | Noted: 2023-09-19

## 2023-09-19 PROBLEM — E78.00 HIGH CHOLESTEROL: Status: ACTIVE | Noted: 2019-12-10

## 2023-09-19 PROBLEM — Z00.00 ROUTINE ADULT HEALTH MAINTENANCE: Status: ACTIVE | Noted: 2019-12-10

## 2023-09-19 PROBLEM — F32.A DEPRESSION: Status: RESOLVED | Noted: 2023-09-19 | Resolved: 2023-09-19

## 2023-09-19 LAB
ALBUMIN SERPL-MCNC: 4.5 G/DL (ref 3.5–5.2)
ALBUMIN/GLOB SERPL: 1.8 G/DL
ALP SERPL-CCNC: 88 U/L (ref 39–117)
ALT SERPL W P-5'-P-CCNC: 31 U/L (ref 1–41)
ANION GAP SERPL CALCULATED.3IONS-SCNC: 10 MMOL/L (ref 5–15)
AST SERPL-CCNC: 22 U/L (ref 1–40)
BASOPHILS # BLD AUTO: 0.06 10*3/MM3 (ref 0–0.2)
BASOPHILS NFR BLD AUTO: 0.9 % (ref 0–1.5)
BILIRUB SERPL-MCNC: 0.6 MG/DL (ref 0–1.2)
BUN SERPL-MCNC: 19 MG/DL (ref 8–23)
BUN/CREAT SERPL: 15.3 (ref 7–25)
CALCIUM SPEC-SCNC: 10.1 MG/DL (ref 8.6–10.5)
CHLORIDE SERPL-SCNC: 97 MMOL/L (ref 98–107)
CHOLEST SERPL-MCNC: 138 MG/DL (ref 0–200)
CO2 SERPL-SCNC: 30 MMOL/L (ref 22–29)
CREAT SERPL-MCNC: 1.24 MG/DL (ref 0.76–1.27)
DEPRECATED RDW RBC AUTO: 42.6 FL (ref 37–54)
EGFRCR SERPLBLD CKD-EPI 2021: 58.8 ML/MIN/1.73
EOSINOPHIL # BLD AUTO: 0.18 10*3/MM3 (ref 0–0.4)
EOSINOPHIL NFR BLD AUTO: 2.8 % (ref 0.3–6.2)
ERYTHROCYTE [DISTWIDTH] IN BLOOD BY AUTOMATED COUNT: 12.4 % (ref 12.3–15.4)
GLOBULIN UR ELPH-MCNC: 2.5 GM/DL
GLUCOSE SERPL-MCNC: 299 MG/DL (ref 65–99)
HBA1C MFR BLD: 8.7 % (ref 4.8–5.6)
HCT VFR BLD AUTO: 42.6 % (ref 37.5–51)
HDLC SERPL-MCNC: 29 MG/DL (ref 40–60)
HGB BLD-MCNC: 15 G/DL (ref 13–17.7)
IMM GRANULOCYTES # BLD AUTO: 0.02 10*3/MM3 (ref 0–0.05)
IMM GRANULOCYTES NFR BLD AUTO: 0.3 % (ref 0–0.5)
LDLC SERPL CALC-MCNC: 39 MG/DL (ref 0–100)
LDLC/HDLC SERPL: 0.41 {RATIO}
LYMPHOCYTES # BLD AUTO: 1.57 10*3/MM3 (ref 0.7–3.1)
LYMPHOCYTES NFR BLD AUTO: 24.4 % (ref 19.6–45.3)
MCH RBC QN AUTO: 33.3 PG (ref 26.6–33)
MCHC RBC AUTO-ENTMCNC: 35.2 G/DL (ref 31.5–35.7)
MCV RBC AUTO: 94.7 FL (ref 79–97)
MONOCYTES # BLD AUTO: 0.69 10*3/MM3 (ref 0.1–0.9)
MONOCYTES NFR BLD AUTO: 10.7 % (ref 5–12)
NEUTROPHILS NFR BLD AUTO: 3.91 10*3/MM3 (ref 1.7–7)
NEUTROPHILS NFR BLD AUTO: 60.9 % (ref 42.7–76)
NRBC BLD AUTO-RTO: 0 /100 WBC (ref 0–0.2)
PLATELET # BLD AUTO: 202 10*3/MM3 (ref 140–450)
PMV BLD AUTO: 11.3 FL (ref 6–12)
POTASSIUM SERPL-SCNC: 4.3 MMOL/L (ref 3.5–5.2)
PROT SERPL-MCNC: 7 G/DL (ref 6–8.5)
RBC # BLD AUTO: 4.5 10*6/MM3 (ref 4.14–5.8)
SODIUM SERPL-SCNC: 137 MMOL/L (ref 136–145)
TRIGL SERPL-MCNC: 486 MG/DL (ref 0–150)
VLDLC SERPL-MCNC: 70 MG/DL (ref 5–40)
WBC NRBC COR # BLD: 6.43 10*3/MM3 (ref 3.4–10.8)

## 2023-09-19 PROCEDURE — 80061 LIPID PANEL: CPT | Performed by: STUDENT IN AN ORGANIZED HEALTH CARE EDUCATION/TRAINING PROGRAM

## 2023-09-19 PROCEDURE — 36415 COLL VENOUS BLD VENIPUNCTURE: CPT | Performed by: STUDENT IN AN ORGANIZED HEALTH CARE EDUCATION/TRAINING PROGRAM

## 2023-09-19 PROCEDURE — 85025 COMPLETE CBC W/AUTO DIFF WBC: CPT | Performed by: STUDENT IN AN ORGANIZED HEALTH CARE EDUCATION/TRAINING PROGRAM

## 2023-09-19 PROCEDURE — 1160F RVW MEDS BY RX/DR IN RCRD: CPT | Performed by: STUDENT IN AN ORGANIZED HEALTH CARE EDUCATION/TRAINING PROGRAM

## 2023-09-19 PROCEDURE — 99214 OFFICE O/P EST MOD 30 MIN: CPT | Performed by: STUDENT IN AN ORGANIZED HEALTH CARE EDUCATION/TRAINING PROGRAM

## 2023-09-19 PROCEDURE — 3074F SYST BP LT 130 MM HG: CPT | Performed by: STUDENT IN AN ORGANIZED HEALTH CARE EDUCATION/TRAINING PROGRAM

## 2023-09-19 PROCEDURE — 3078F DIAST BP <80 MM HG: CPT | Performed by: STUDENT IN AN ORGANIZED HEALTH CARE EDUCATION/TRAINING PROGRAM

## 2023-09-19 PROCEDURE — 83036 HEMOGLOBIN GLYCOSYLATED A1C: CPT | Performed by: STUDENT IN AN ORGANIZED HEALTH CARE EDUCATION/TRAINING PROGRAM

## 2023-09-19 PROCEDURE — 80053 COMPREHEN METABOLIC PANEL: CPT | Performed by: STUDENT IN AN ORGANIZED HEALTH CARE EDUCATION/TRAINING PROGRAM

## 2023-09-19 PROCEDURE — 1159F MED LIST DOCD IN RCRD: CPT | Performed by: STUDENT IN AN ORGANIZED HEALTH CARE EDUCATION/TRAINING PROGRAM

## 2023-09-19 RX ORDER — PREGABALIN 75 MG/1
75 CAPSULE ORAL 2 TIMES DAILY
COMMUNITY
Start: 2023-08-29 | End: 2023-09-19

## 2023-09-19 NOTE — PROGRESS NOTES
"Chief Complaint  Establish Care and Extremity Weakness (Pt states he is experiencing loss of balance when sitting for long periods of time. No numbness or tingling )    Subjective    Dioni Macias presents to [facility]  Extremity Weakness     79 yo male pmh of htn, depression, hld, comes to the clinic for dizziness spell when standing up fast. Patient denies fall, loc, seizure, tremors, others.       Objective   Vital Signs:   Vitals:    09/19/23 0855   BP: 121/57   BP Location: Left arm   Patient Position: Sitting   Pulse: 62   Temp: 97 °F (36.1 °C)   SpO2: 97%   Weight: 78.6 kg (173 lb 6 oz)   Height: 177.8 cm (70\")       Wt Readings from Last 3 Encounters:   09/19/23 78.6 kg (173 lb 6 oz)   04/28/23 77 kg (169 lb 12.8 oz)   04/19/23 80.7 kg (178 lb)     BP Readings from Last 3 Encounters:   09/19/23 121/57   04/28/23 122/83   04/19/23 110/62         Physical Exam  HENT:      Head: Normocephalic.      Mouth/Throat:      Mouth: Mucous membranes are moist.   Eyes:      Pupils: Pupils are equal, round, and reactive to light.   Cardiovascular:      Rate and Rhythm: Normal rate.   Pulmonary:      Effort: Pulmonary effort is normal.   Abdominal:      General: Abdomen is flat.   Musculoskeletal:         General: Normal range of motion.      Cervical back: Normal range of motion.   Skin:     General: Skin is warm.      Capillary Refill: Capillary refill takes less than 2 seconds.   Neurological:      General: No focal deficit present.      Mental Status: He is alert. Mental status is at baseline.        Result Review :  The following data was reviewed by: Sarath Jernigan MD on 09/19/2023:             Assessment and Plan   Diagnoses and all orders for this visit:    1. Encounter to establish care (Primary)  -     CBC w AUTO Differential; Future  -     Comprehensive metabolic panel; Future  -     Hemoglobin A1c; Future  -     Lipid panel; Future    2. Hyperlipidemia, " unspecified hyperlipidemia type  -     CBC w AUTO Differential; Future  -     Comprehensive metabolic panel; Future  -     Hemoglobin A1c; Future  -     Lipid panel; Future    3. Other specified disorders of carbohydrate metabolism  -     Hemoglobin A1c; Future    4. Primary hypertension    5. Anemia due to vitamin B12 deficiency, unspecified B12 deficiency type    6. Dizziness  Comments:  patient refers dizziness when standing from sitting position. denies fall, loc.    plan: will titrate down medications that can cause interation and posible orthostatic hypotension. patienty is not orthostatic at the office.   Repeat cbc     BMI is within normal parameters. No other follow-up for BMI required.         FOLLOW UP  No follow-ups on file.  Patient was given instructions and counseling regarding his condition or for health maintenance advice. Please see specific information pulled into the AVS if appropriate.       Sarath Jernigan MD  09/19/23  09:37 EDT    CURRENT & DISCONTINUED MEDICATIONS  Current Outpatient Medications   Medication Instructions    Acetaminophen (Tylenol) 325 MG capsule Oral    atorvastatin (LIPITOR) 40 MG tablet TAKE ONE TABLET BY MOUTH EVERY NIGHT AT BEDTIME    donepezil (ARICEPT) 5 mg, Oral, Daily    DULoxetine (CYMBALTA) 60 MG capsule TAKE ONE CAPSULE BY MOUTH DAILY    gabapentin (NEURONTIN) 300 MG capsule TAKE TWO CAPSULES BY MOUTH THREE TIMES A DAY    Ibuprofen 200 MG capsule 1 capsule, Oral, 2 Times Daily    metoprolol succinate XL (TOPROL-XL) 25 MG 24 hr tablet TAKE 1/2 TABLET BY MOUTH DAILY    Omega-3 Fatty Acids (FISH OIL CONCENTRATE PO) Oral    Omega-3 Fatty Acids (fish oil) 1000 MG capsule capsule Oral, Daily       Medications Discontinued During This Encounter   Medication Reason    lactated ringers bolus 500 mL     cyanocobalamin injection 1,000 mcg     pregabalin (LYRICA) 75 MG capsule *Therapy completed      RTC in 3 months

## 2023-09-27 ENCOUNTER — OFFICE VISIT (OUTPATIENT)
Dept: FAMILY MEDICINE CLINIC | Facility: CLINIC | Age: 78
End: 2023-09-27
Payer: MEDICARE

## 2023-09-27 VITALS
HEART RATE: 55 BPM | OXYGEN SATURATION: 98 % | WEIGHT: 171.4 LBS | DIASTOLIC BLOOD PRESSURE: 68 MMHG | BODY MASS INDEX: 24.54 KG/M2 | HEIGHT: 70 IN | SYSTOLIC BLOOD PRESSURE: 117 MMHG | TEMPERATURE: 96 F

## 2023-09-27 DIAGNOSIS — E11.65 TYPE 2 DIABETES MELLITUS WITH HYPERGLYCEMIA, WITHOUT LONG-TERM CURRENT USE OF INSULIN: Primary | ICD-10-CM

## 2023-09-27 DIAGNOSIS — E53.8 VITAMIN B12 DEFICIENCY: ICD-10-CM

## 2023-09-27 DIAGNOSIS — M25.551 HIP PAIN, RIGHT: ICD-10-CM

## 2023-09-27 PROBLEM — D51.9 ANEMIA DUE TO VITAMIN B12 DEFICIENCY: Status: RESOLVED | Noted: 2023-04-04 | Resolved: 2023-09-27

## 2023-09-27 PROCEDURE — 99214 OFFICE O/P EST MOD 30 MIN: CPT | Performed by: STUDENT IN AN ORGANIZED HEALTH CARE EDUCATION/TRAINING PROGRAM

## 2023-09-27 PROCEDURE — 1159F MED LIST DOCD IN RCRD: CPT | Performed by: STUDENT IN AN ORGANIZED HEALTH CARE EDUCATION/TRAINING PROGRAM

## 2023-09-27 PROCEDURE — 3078F DIAST BP <80 MM HG: CPT | Performed by: STUDENT IN AN ORGANIZED HEALTH CARE EDUCATION/TRAINING PROGRAM

## 2023-09-27 PROCEDURE — 1160F RVW MEDS BY RX/DR IN RCRD: CPT | Performed by: STUDENT IN AN ORGANIZED HEALTH CARE EDUCATION/TRAINING PROGRAM

## 2023-09-27 PROCEDURE — 3074F SYST BP LT 130 MM HG: CPT | Performed by: STUDENT IN AN ORGANIZED HEALTH CARE EDUCATION/TRAINING PROGRAM

## 2023-09-27 RX ORDER — CYANOCOBALAMIN
1000 KIT ONCE
Qty: 1 ML | Refills: 0 | Status: SHIPPED | OUTPATIENT
Start: 2023-09-27 | End: 2023-09-27

## 2023-09-27 RX ORDER — METFORMIN HYDROCHLORIDE 500 MG/1
500 TABLET, EXTENDED RELEASE ORAL
Qty: 30 TABLET | Refills: 2 | Status: SHIPPED | OUTPATIENT
Start: 2023-09-27 | End: 2023-12-26

## 2023-09-27 RX ORDER — GABAPENTIN 300 MG/1
300 CAPSULE ORAL 3 TIMES DAILY
Qty: 270 CAPSULE | Refills: 0 | Status: SHIPPED | OUTPATIENT
Start: 2023-09-27 | End: 2023-12-26

## 2023-09-27 RX ORDER — LANOLIN ALCOHOL/MO/W.PET/CERES
1000 CREAM (GRAM) TOPICAL DAILY
Qty: 30 TABLET | Refills: 2 | Status: SHIPPED | OUTPATIENT
Start: 2023-09-27 | End: 2023-12-26

## 2023-09-27 NOTE — PROGRESS NOTES
"Chief Complaint  Dizziness (Follow up due to high glucose lab results), Hypertension, Hyperlipidemia, and Depression    Subjective  Diabetes.      Dioni Macias is a 78 y.o. male who presents to [facility] with a past medical history of htn, depression, hld, diabetes, patient comes today for diabetes evaluation as new diagnoses. Diet and exercise advised to control hyperglycemia and reduce cardiovascular risk.         Objective   Vital Signs:   Vitals:    09/27/23 0828   BP: 117/68   BP Location: Left arm   Patient Position: Sitting   Cuff Size: Adult   Pulse: 55   Temp: 96 °F (35.6 °C)   SpO2: 98%   Weight: 77.7 kg (171 lb 6.4 oz)   Height: 177.8 cm (70\")   PainSc: 0-No pain       Wt Readings from Last 3 Encounters:   09/27/23 77.7 kg (171 lb 6.4 oz)   09/19/23 78.6 kg (173 lb 6 oz)   04/28/23 77 kg (169 lb 12.8 oz)     BP Readings from Last 3 Encounters:   09/27/23 117/68   09/19/23 121/57   04/28/23 122/83         Physical Exam  Vitals reviewed.   HENT:      Head: Normocephalic.      Mouth/Throat:      Mouth: Mucous membranes are moist.   Eyes:      Pupils: Pupils are equal, round, and reactive to light.   Cardiovascular:      Rate and Rhythm: Normal rate.   Abdominal:      General: Abdomen is flat.   Musculoskeletal:         General: Normal range of motion.      Cervical back: Normal range of motion.   Skin:     General: Skin is warm.      Capillary Refill: Capillary refill takes less than 2 seconds.   Neurological:      Mental Status: He is alert.          Result Review :  The following data was reviewed by: Sarath Jernigan MD on 09/27/2023:         Assessment and Plan   Diagnoses and all orders for this visit:    1. Type 2 diabetes mellitus with hyperglycemia, without long-term current use of insulin (Primary)  -     Lipid panel; Future  -     Hemoglobin A1c; Future  -     Microalbumin / Creatinine Urine Ratio - Urine, Clean Catch; Future  -     CBC w AUTO " Differential; Future  -     Comprehensive metabolic panel; Future    2. Hip pain, right  -     gabapentin (NEURONTIN) 300 MG capsule; Take 1 capsule by mouth 3 (Three) Times a Day for 90 days.  Dispense: 270 capsule; Refill: 0    3. Vitamin B12 deficiency  Comments:  patient on B12 supplements. repeat B12 next visit and adjust meds.  Orders:  -     Vitamin B12; Future    Other orders  -     metFORMIN ER (GLUCOPHAGE-XR) 500 MG 24 hr tablet; Take 1 tablet by mouth Daily With Breakfast for 90 days.  Dispense: 30 tablet; Refill: 2  -     vitamin B-12 (CYANOCOBALAMIN) 1000 MCG tablet; Take 1 tablet by mouth Daily for 90 days.  Dispense: 30 tablet; Refill: 2  -     Cyanocobalamin (Vitamin Deficiency System-B12) 1000 MCG/ML kit; Inject 1 mL as directed 1 (One) Time for 1 dose.  Dispense: 1 mL; Refill: 0        BMI is within normal parameters. No other follow-up for BMI required.         FOLLOW UP  Return in about 3 months (around 12/27/2023).  Patient was given instructions and counseling regarding his condition or for health maintenance advice. Please see specific information pulled into the AVS if appropriate.       Sarath Jernigan MD  09/27/23  12:44 EDT    CURRENT & DISCONTINUED MEDICATIONS  Current Outpatient Medications   Medication Instructions    Acetaminophen (Tylenol) 325 MG capsule Oral    atorvastatin (LIPITOR) 40 MG tablet TAKE ONE TABLET BY MOUTH EVERY NIGHT AT BEDTIME    donepezil (ARICEPT) 5 mg, Oral, Daily    DULoxetine (CYMBALTA) 60 MG capsule TAKE ONE CAPSULE BY MOUTH DAILY    gabapentin (NEURONTIN) 300 mg, Oral, 3 Times Daily    metFORMIN ER (GLUCOPHAGE-XR) 500 mg, Oral, Daily With Breakfast    metoprolol succinate XL (TOPROL-XL) 25 MG 24 hr tablet TAKE 1/2 TABLET BY MOUTH DAILY    Omega-3 Fatty Acids (FISH OIL CONCENTRATE PO) Oral    Omega-3 Fatty Acids (fish oil) 1000 MG capsule capsule Oral, Daily    vitamin B-12 (CYANOCOBALAMIN) 1,000 mcg, Oral, Daily    Vitamin Deficiency System-B12  1,000 mcg, Injection, Once       Medications Discontinued During This Encounter   Medication Reason    Ibuprofen 200 MG capsule *Therapy completed    gabapentin (NEURONTIN) 300 MG capsule Reorder        RTC with labs including B12, CMP, CBC, A1c.

## 2023-09-28 ENCOUNTER — CLINICAL SUPPORT (OUTPATIENT)
Dept: FAMILY MEDICINE CLINIC | Facility: CLINIC | Age: 78
End: 2023-09-28
Payer: MEDICARE

## 2023-09-28 DIAGNOSIS — E53.8 VITAMIN B12 DEFICIENCY: Primary | ICD-10-CM

## 2023-09-28 PROCEDURE — 96372 THER/PROPH/DIAG INJ SC/IM: CPT | Performed by: STUDENT IN AN ORGANIZED HEALTH CARE EDUCATION/TRAINING PROGRAM

## 2023-09-28 RX ORDER — CYANOCOBALAMIN 1000 UG/ML
1000 INJECTION, SOLUTION INTRAMUSCULAR; SUBCUTANEOUS
Status: SHIPPED | OUTPATIENT
Start: 2023-09-28

## 2023-09-28 RX ADMIN — CYANOCOBALAMIN 1000 MCG: 1000 INJECTION, SOLUTION INTRAMUSCULAR; SUBCUTANEOUS at 08:53

## 2023-09-28 NOTE — PROGRESS NOTES
After obtaining consent, and per orders of Dr. Yang , injection of b12 was given by Jennifer Lopez. Patient tolerated shot well

## 2023-10-13 ENCOUNTER — CLINICAL SUPPORT (OUTPATIENT)
Dept: FAMILY MEDICINE CLINIC | Facility: CLINIC | Age: 78
End: 2023-10-13
Payer: MEDICARE

## 2023-10-13 DIAGNOSIS — Z23 NEED FOR INFLUENZA VACCINATION: Primary | ICD-10-CM

## 2023-10-13 NOTE — PROGRESS NOTES
Injection  Injection performed in left arm by Amira Lopez. Patient tolerated the procedure well without complications.  10/13/23   Amira Lopez

## 2023-10-26 ENCOUNTER — CLINICAL SUPPORT (OUTPATIENT)
Dept: FAMILY MEDICINE CLINIC | Facility: CLINIC | Age: 78
End: 2023-10-26
Payer: MEDICARE

## 2023-10-26 DIAGNOSIS — E53.8 VITAMIN B12 DEFICIENCY: Primary | ICD-10-CM

## 2023-10-26 RX ADMIN — CYANOCOBALAMIN 1000 MCG: 1000 INJECTION, SOLUTION INTRAMUSCULAR; SUBCUTANEOUS at 09:45

## 2023-10-26 NOTE — PROGRESS NOTES
After obtaining consent, and per orders of Dr. Yang, injection of B-12 given by Amira Lopez. Right arm. Patient tolerated well  Patient instructed to remain in clinic for 20 minutes afterwards, and to report any adverse reaction to me immediately.

## 2023-11-15 RX ORDER — BLOOD SUGAR DIAGNOSTIC
STRIP MISCELLANEOUS
Qty: 100 EACH | Refills: 3 | Status: SHIPPED | OUTPATIENT
Start: 2023-11-15 | End: 2023-11-19 | Stop reason: SDUPTHER

## 2023-11-15 RX ORDER — LANCETS 33 GAUGE
33 EACH MISCELLANEOUS DAILY
Qty: 100 EACH | Refills: 3 | Status: SHIPPED | OUTPATIENT
Start: 2023-11-15 | End: 2023-11-19 | Stop reason: SDUPTHER

## 2023-11-15 RX ORDER — BLOOD-GLUCOSE METER
KIT MISCELLANEOUS
Qty: 1 EACH | Refills: 0 | Status: SHIPPED | OUTPATIENT
Start: 2023-11-15 | End: 2023-11-19 | Stop reason: SDUPTHER

## 2023-11-19 PROBLEM — E11.65 TYPE 2 DIABETES MELLITUS WITH HYPERGLYCEMIA, WITHOUT LONG-TERM CURRENT USE OF INSULIN: Status: ACTIVE | Noted: 2023-11-19

## 2023-11-19 RX ORDER — BLOOD SUGAR DIAGNOSTIC
STRIP MISCELLANEOUS
Qty: 100 EACH | Refills: 3 | Status: SHIPPED | OUTPATIENT
Start: 2023-11-19

## 2023-11-19 RX ORDER — LANCETS 33 GAUGE
33 EACH MISCELLANEOUS DAILY
Qty: 100 EACH | Refills: 3 | Status: SHIPPED | OUTPATIENT
Start: 2023-11-19 | End: 2024-02-27

## 2023-11-19 RX ORDER — BLOOD-GLUCOSE METER
KIT MISCELLANEOUS
Qty: 1 EACH | Refills: 0 | Status: SHIPPED | OUTPATIENT
Start: 2023-11-19

## 2023-11-29 ENCOUNTER — CLINICAL SUPPORT (OUTPATIENT)
Dept: FAMILY MEDICINE CLINIC | Facility: CLINIC | Age: 78
End: 2023-11-29
Payer: MEDICARE

## 2023-11-29 DIAGNOSIS — E53.8 VITAMIN B12 DEFICIENCY: Primary | ICD-10-CM

## 2023-11-29 RX ADMIN — CYANOCOBALAMIN 1000 MCG: 1000 INJECTION, SOLUTION INTRAMUSCULAR; SUBCUTANEOUS at 11:15

## 2023-11-29 NOTE — PROGRESS NOTES
Injection  Injection performed in left arm by vincent becerra. Patient tolerated the procedure well without complications.  11/29/23   Kirsten Wong

## 2023-12-26 RX ORDER — METFORMIN HYDROCHLORIDE 500 MG/1
500 TABLET, EXTENDED RELEASE ORAL
Qty: 90 TABLET | Refills: 1 | Status: SHIPPED | OUTPATIENT
Start: 2023-12-26

## 2023-12-28 ENCOUNTER — OFFICE VISIT (OUTPATIENT)
Dept: FAMILY MEDICINE CLINIC | Facility: CLINIC | Age: 78
End: 2023-12-28
Payer: MEDICARE

## 2023-12-28 VITALS
HEIGHT: 70 IN | BODY MASS INDEX: 22.9 KG/M2 | OXYGEN SATURATION: 95 % | HEART RATE: 60 BPM | WEIGHT: 160 LBS | SYSTOLIC BLOOD PRESSURE: 120 MMHG | DIASTOLIC BLOOD PRESSURE: 60 MMHG | TEMPERATURE: 97.1 F

## 2023-12-28 DIAGNOSIS — M54.30 SCIATIC NERVE PAIN, UNSPECIFIED LATERALITY: ICD-10-CM

## 2023-12-28 DIAGNOSIS — Z00.00 MEDICARE ANNUAL WELLNESS VISIT, SUBSEQUENT: Primary | ICD-10-CM

## 2023-12-28 DIAGNOSIS — E11.65 TYPE 2 DIABETES MELLITUS WITH HYPERGLYCEMIA, WITHOUT LONG-TERM CURRENT USE OF INSULIN: ICD-10-CM

## 2023-12-28 DIAGNOSIS — I10 PRIMARY HYPERTENSION: ICD-10-CM

## 2023-12-28 DIAGNOSIS — M25.551 HIP PAIN, RIGHT: ICD-10-CM

## 2023-12-28 DIAGNOSIS — Z79.899 MEDICATION MANAGEMENT: ICD-10-CM

## 2023-12-28 DIAGNOSIS — E53.8 VITAMIN B12 DEFICIENCY: ICD-10-CM

## 2023-12-28 DIAGNOSIS — E78.5 HYPERLIPIDEMIA, UNSPECIFIED HYPERLIPIDEMIA TYPE: ICD-10-CM

## 2023-12-28 LAB
ALBUMIN SERPL-MCNC: 4.5 G/DL (ref 3.5–5.2)
ALBUMIN UR-MCNC: 1.6 MG/DL
ALBUMIN/GLOB SERPL: 1.7 G/DL
ALP SERPL-CCNC: 79 U/L (ref 39–117)
ALT SERPL W P-5'-P-CCNC: 20 U/L (ref 1–41)
AMPHET+METHAMPHET UR QL: NEGATIVE
AMPHETAMINE INTERNAL CONTROL: NORMAL
AMPHETAMINES UR QL: NEGATIVE
ANION GAP SERPL CALCULATED.3IONS-SCNC: 11.8 MMOL/L (ref 5–15)
AST SERPL-CCNC: 19 U/L (ref 1–40)
BARBITURATE INTERNAL CONTROL: NORMAL
BARBITURATES UR QL SCN: NEGATIVE
BENZODIAZ UR QL SCN: NEGATIVE
BENZODIAZEPINE INTERNAL CONTROL: NORMAL
BILIRUB SERPL-MCNC: 0.6 MG/DL (ref 0–1.2)
BUN SERPL-MCNC: 15 MG/DL (ref 8–23)
BUN/CREAT SERPL: 12.7 (ref 7–25)
BUPRENORPHINE INTERNAL CONTROL: NORMAL
BUPRENORPHINE SERPL-MCNC: NEGATIVE NG/ML
CALCIUM SPEC-SCNC: 9.9 MG/DL (ref 8.6–10.5)
CANNABINOIDS SERPL QL: NEGATIVE
CHLORIDE SERPL-SCNC: 101 MMOL/L (ref 98–107)
CHOLEST SERPL-MCNC: 118 MG/DL (ref 0–200)
CO2 SERPL-SCNC: 25.2 MMOL/L (ref 22–29)
COCAINE INTERNAL CONTROL: NORMAL
COCAINE UR QL: NEGATIVE
CREAT SERPL-MCNC: 1.18 MG/DL (ref 0.76–1.27)
EGFRCR SERPLBLD CKD-EPI 2021: 62.4 ML/MIN/1.73
EXPIRATION DATE: NORMAL
GLOBULIN UR ELPH-MCNC: 2.7 GM/DL
GLUCOSE SERPL-MCNC: 121 MG/DL (ref 65–99)
HBA1C MFR BLD: 6.4 % (ref 4.8–5.6)
HDLC SERPL-MCNC: 34 MG/DL (ref 40–60)
LDLC SERPL CALC-MCNC: 47 MG/DL (ref 0–100)
LDLC/HDLC SERPL: 1.08 {RATIO}
Lab: NORMAL
MDMA (ECSTASY) INTERNAL CONTROL: NORMAL
MDMA UR QL SCN: NEGATIVE
METHADONE INTERNAL CONTROL: NORMAL
METHADONE UR QL SCN: NEGATIVE
METHAMPHETAMINE INTERNAL CONTROL: NORMAL
MORPHINE INTERNAL CONTROL: NORMAL
MORPHINE UR QL SCN: NEGATIVE
OXYCODONE INTERNAL CONTROL: NORMAL
OXYCODONE UR QL SCN: NEGATIVE
PCP UR QL SCN: NEGATIVE
PHENCYCLIDINE INTERNAL CONTROL: NORMAL
POTASSIUM SERPL-SCNC: 4.5 MMOL/L (ref 3.5–5.2)
PROPOXYPH UR QL SCN: NEGATIVE
PROPOXYPHENE INTERNAL CONTROL: NORMAL
PROT SERPL-MCNC: 7.2 G/DL (ref 6–8.5)
SODIUM SERPL-SCNC: 138 MMOL/L (ref 136–145)
THC INTERNAL CONTROL: NORMAL
TRICYCLIC ANTIDEPRESSANTS INTERNAL CONTROL: NORMAL
TRICYCLICS UR QL SCN: NEGATIVE
TRIGL SERPL-MCNC: 237 MG/DL (ref 0–150)
VIT B12 BLD-MCNC: >2000 PG/ML (ref 211–946)
VLDLC SERPL-MCNC: 37 MG/DL (ref 5–40)

## 2023-12-28 PROCEDURE — 80053 COMPREHEN METABOLIC PANEL: CPT | Performed by: STUDENT IN AN ORGANIZED HEALTH CARE EDUCATION/TRAINING PROGRAM

## 2023-12-28 PROCEDURE — 80061 LIPID PANEL: CPT | Performed by: STUDENT IN AN ORGANIZED HEALTH CARE EDUCATION/TRAINING PROGRAM

## 2023-12-28 PROCEDURE — 83036 HEMOGLOBIN GLYCOSYLATED A1C: CPT | Performed by: STUDENT IN AN ORGANIZED HEALTH CARE EDUCATION/TRAINING PROGRAM

## 2023-12-28 PROCEDURE — 82043 UR ALBUMIN QUANTITATIVE: CPT | Performed by: STUDENT IN AN ORGANIZED HEALTH CARE EDUCATION/TRAINING PROGRAM

## 2023-12-28 PROCEDURE — 82607 VITAMIN B-12: CPT | Performed by: STUDENT IN AN ORGANIZED HEALTH CARE EDUCATION/TRAINING PROGRAM

## 2023-12-28 RX ORDER — METOPROLOL SUCCINATE 25 MG/1
12.5 TABLET, EXTENDED RELEASE ORAL DAILY
Qty: 45 TABLET | Refills: 3 | Status: CANCELLED | OUTPATIENT
Start: 2023-12-28

## 2023-12-28 RX ORDER — ASPIRIN 81 MG/1
81 TABLET ORAL DAILY
Qty: 90 TABLET | Refills: 3 | Status: SHIPPED | OUTPATIENT
Start: 2023-12-28

## 2023-12-28 RX ORDER — GABAPENTIN 300 MG/1
300 CAPSULE ORAL NIGHTLY
Qty: 90 CAPSULE | Refills: 2 | Status: SHIPPED | OUTPATIENT
Start: 2023-12-28

## 2023-12-28 RX ADMIN — CYANOCOBALAMIN 1000 MCG: 1000 INJECTION, SOLUTION INTRAMUSCULAR; SUBCUTANEOUS at 09:28

## 2023-12-28 NOTE — PROGRESS NOTES
Injection  Injection performed in right arm by SEDRICK Lopez. Patient tolerated the procedure well without complications.  12/28/23   Kirsten Wong

## 2023-12-28 NOTE — PROGRESS NOTES
The ABCs of the Annual Wellness Visit  Subsequent Medicare Wellness Visit    Subjective    Dioni Macias is a 78 y.o. male who presents for a Subsequent Medicare Wellness Visit.    The following portions of the patient's history were reviewed and   updated as appropriate: allergies, current medications, past family history, past medical history, past social history, past surgical history, and problem list.    Compared to one year ago, the patient feels his physical   health is the same.    Compared to one year ago, the patient feels his mental   health is the same.    Recent Hospitalizations:  He was not admitted to the hospital during the last year.       Current Medical Providers:  Patient Care Team:  Sarath Yang MD as PCP - General (Internal Medicine)    Outpatient Medications Prior to Visit   Medication Sig Dispense Refill    Acetaminophen (Tylenol) 325 MG capsule Take  by mouth.      atorvastatin (LIPITOR) 40 MG tablet TAKE ONE TABLET BY MOUTH EVERY NIGHT AT BEDTIME 90 tablet 3    donepezil (ARICEPT) 5 MG tablet Take 1 tablet by mouth Daily.      DULoxetine (CYMBALTA) 60 MG capsule TAKE ONE CAPSULE BY MOUTH DAILY 90 capsule 3    glucose blood (Glucose Meter Test) test strip check blood sugar every AM fasting and record. (Dx: E11.9) 100 each 3    glucose monitor monitoring kit check blood sugar every AM fasting and record. (Dx: E11.9) 1 each 0    Lancets 33G misc Use 33 g Daily for 100 days. 100 each 3    metFORMIN ER (GLUCOPHAGE-XR) 500 MG 24 hr tablet Take 1 tablet by mouth Daily With Breakfast. 90 tablet 1    Omega-3 Fatty Acids (FISH OIL CONCENTRATE PO) Take  by mouth.      gabapentin (NEURONTIN) 300 MG capsule Take 1 capsule by mouth 3 (Three) Times a Day for 90 days. 270 capsule 0    metoprolol succinate XL (TOPROL-XL) 25 MG 24 hr tablet TAKE 1/2 TABLET BY MOUTH DAILY 45 tablet 3    Omega-3 Fatty Acids (fish oil) 1000 MG capsule capsule Take  by mouth Daily.       Facility-Administered  "Medications Prior to Visit   Medication Dose Route Frequency Provider Last Rate Last Admin    cyanocobalamin injection 1,000 mcg  1,000 mcg Subcutaneous Q28 Days Sarath Yang MD   1,000 mcg at 12/28/23 0928    cyanocobalamin injection 1,000 mcg  1,000 mcg Intramuscular Q28 Days Sarath Yang MD           No opioid medication identified on active medication list. I have reviewed chart for other potential  high risk medication/s and harmful drug interactions in the elderly.        Aspirin is on active medication list. Aspirin use is indicated based on review of current medical condition/s. Pros and cons of this therapy have been discussed today. Benefits of this medication outweigh potential harm.  Patient has been encouraged to continue taking this medication.  .      Patient Active Problem List   Diagnosis    Depression, major, recurrent, mild    Diverticulosis of colon    Gastric reflux    High blood pressure    Other and unspecified hyperlipidemia    Mixed hyperlipidemia    Routine adult health maintenance    High cholesterol    Type 2 diabetes mellitus with hyperglycemia, without long-term current use of insulin     Advance Care Planning   Advance Care Planning     Advance Directive is on file.  ACP discussion was held with the patient during this visit. Patient has an advance directive in EMR which is still valid.      Objective    Vitals:    12/28/23 0837   BP: 120/60   Pulse: 60   Temp: 97.1 °F (36.2 °C)   SpO2: 95%   Weight: 72.6 kg (160 lb)   Height: 177.8 cm (70\")     Estimated body mass index is 22.96 kg/m² as calculated from the following:    Height as of this encounter: 177.8 cm (70\").    Weight as of this encounter: 72.6 kg (160 lb).    BMI is within normal parameters. No other follow-up for BMI required.      Does the patient have evidence of cognitive impairment?  Stable          HEALTH RISK ASSESSMENT    Smoking Status:  Social History     Tobacco Use   Smoking Status Former    " Packs/day: 1.00    Years: 30.00    Additional pack years: 0.00    Total pack years: 30.00    Types: Cigarettes    Quit date:     Years since quittin.0   Smokeless Tobacco Former    Types: Chew, Snuff    Quit date:      Alcohol Consumption:  Social History     Substance and Sexual Activity   Alcohol Use Yes    Alcohol/week: 2.0 standard drinks of alcohol    Types: 2 Cans of beer per week     Fall Risk Screen:    ONEL Fall Risk Assessment was completed, and patient is at LOW risk for falls.Assessment completed on:2023    Depression Screenin/28/2023     8:50 AM   PHQ-2/PHQ-9 Depression Screening   Little Interest or Pleasure in Doing Things 0-->not at all   Feeling Down, Depressed or Hopeless 0-->not at all   PHQ-9: Brief Depression Severity Measure Score 0       Health Habits and Functional and Cognitive Screenin/28/2023     8:46 AM   Functional & Cognitive Status   Do you have difficulty preparing food and eating? No   Do you have difficulty bathing yourself, getting dressed or grooming yourself? No   Do you have difficulty using the toilet? Yes   Do you have difficulty moving around from place to place? No   Do you have trouble with steps or getting out of a bed or a chair? No   Current Diet Well Balanced Diet   Dental Exam Up to date   Eye Exam Not up to date   Exercise (times per week) 7 times per week   Current Exercises Include Walking;Yard Work;Stationary Bicycling/Spin Class   Do you need help using the phone?  No   Are you deaf or do you have serious difficulty hearing?  No   Do you need help to go to places out of walking distance? No   Do you need help shopping? No   Do you need help preparing meals?  No   Do you need help with housework?  No   Do you need help with laundry? No   Do you need help taking your medications? No   Do you need help managing money? No   Do you ever drive or ride in a car without wearing a seat belt? No   Have you felt unusual stress, anger  or loneliness in the last month? Yes   Who do you live with? Spouse   If you need help, do you have trouble finding someone available to you? No   Do you have difficulty concentrating, remembering or making decisions? Yes       Age-appropriate Screening Schedule:  Refer to the list below for future screening recommendations based on patient's age, sex and/or medical conditions. Orders for these recommended tests are listed in the plan section. The patient has been provided with a written plan.    Health Maintenance   Topic Date Due    URINE MICROALBUMIN  Never done    TDAP/TD VACCINES (1 - Tdap) Never done    ZOSTER VACCINE (2 of 2) 10/09/2018    DIABETIC EYE EXAM  10/04/2021    HEMOGLOBIN A1C  03/19/2024    LIPID PANEL  09/19/2024    ANNUAL WELLNESS VISIT  12/28/2024    COVID-19 Vaccine  Completed    INFLUENZA VACCINE  Completed    Pneumococcal Vaccine 65+  Completed                  CMS Preventative Services Quick Reference  Risk Factors Identified During Encounter  None Identified  The above risks/problems have been discussed with the patient.  Pertinent information has been shared with the patient in the After Visit Summary.  An After Visit Summary and PPPS were made available to the patient.    Follow Up:   Next Medicare Wellness visit to be scheduled in 1 year.       Additional E&M Note during same encounter follows:  Patient has multiple medical problems which are significant and separately identifiable that require additional work above and beyond the Medicare Wellness Visit.      Chief Complaint  Medicare Wellness-subsequent (Here for medicare annual wellness), Dizziness (Still having some dizzy spells), and B12 Injection (B 12 injection)    Subjective        Dizziness      Dioni Macias is also being seen today for diabetes, Hyperlipidemia, depression, HTN.     Patient comes today for Diabetes control. Blood work to be collected with microalbumin. B12 injection today.     Patient refers dizziness  "spells specially while changing positions. Episodes last couple of minutes. Patient medication reviewed. DC or reduce medications that can produce this side effect. Will DC metoprolol and reduce gabapentin only to night time and not during the day. BP log for his next visit.       Review of Systems   Neurological:  Positive for dizziness.       Objective   Vital Signs:  /60   Pulse 60   Temp 97.1 °F (36.2 °C)   Ht 177.8 cm (70\")   Wt 72.6 kg (160 lb)   SpO2 95%   BMI 22.96 kg/m²     Physical Exam  Vitals reviewed.   HENT:      Head: Normocephalic.      Mouth/Throat:      Mouth: Mucous membranes are moist.   Eyes:      Pupils: Pupils are equal, round, and reactive to light.   Cardiovascular:      Rate and Rhythm: Normal rate.   Abdominal:      General: Abdomen is flat.   Musculoskeletal:         General: Normal range of motion.      Cervical back: Normal range of motion.   Skin:     General: Skin is warm.      Capillary Refill: Capillary refill takes less than 2 seconds.   Neurological:      Mental Status: He is alert.                     Assessment and Plan   Diagnoses and all orders for this visit:    1. Medicare annual wellness visit, subsequent (Primary)    2. Vitamin B12 deficiency  Comments:  patient on B12 supplements. repeat B12 next visit and adjust meds.  Orders:  -     Cancel: Vitamin B12  -     Vitamin B12    3. Type 2 diabetes mellitus with hyperglycemia, without long-term current use of insulin  -     Cancel: Hemoglobin A1c  -     Lipid panel  -     Ambulatory Referral for Diabetic Eye Exam-Ophthalmology  -     MicroAlbumin, Urine, Random - Urine, Clean Catch  -     Comprehensive metabolic panel  -     Hemoglobin A1c  -     Hemoglobin A1c; Future    4. Primary hypertension  -     CBC & Differential; Future  -     Comprehensive Metabolic Panel; Future    5. Hip pain, right  -     gabapentin (NEURONTIN) 300 MG capsule; Take 1 capsule by mouth Every Night.  Dispense: 90 capsule; Refill: 2    6. " Hyperlipidemia, unspecified hyperlipidemia type  -     Cancel: Lipid Panel; Future    7. Sciatic nerve pain, unspecified laterality  -     POC T-Cup Urine Drug Screen    8. Medication management  -     POC T-Cup Urine Drug Screen    Other orders  -     Pneumococcal Conjugate Vaccine 20-Valent (PCV20)  -     aspirin 81 MG EC tablet; Take 1 tablet by mouth Daily.  Dispense: 90 tablet; Refill: 3           I spent 50 minutes caring for Dioni on this date of service. This time includes time spent by me in the following activities:preparing for the visit, reviewing tests, obtaining and/or reviewing a separately obtained history, performing a medically appropriate examination and/or evaluation , counseling and educating the patient/family/caregiver, documenting information in the medical record, independently interpreting results and communicating that information with the patient/family/caregiver, and care coordination  Follow Up   No follow-ups on file.  Patient was given instructions and counseling regarding his condition or for health maintenance advice. Please see specific information pulled into the AVS if appropriate.

## 2023-12-28 NOTE — PROGRESS NOTES
"Chief Complaint  Medicare Wellness-subsequent (Here for medicare annual wellness), Dizziness (Still having some dizzy spells), and B12 Injection (B 12 injection)    Subjective      Dizziness      Dioni Macias is a 78 y.o. male who presents to [facility] with a past medical history of diabetes, Hyperlipidemia, depression, HTN.     Patient comes today for Diabetes control. Blood work to be collected with microalbumin. B12 injection today.     Patient refers dizziness spells specially while changing positions. Episodes last couple of minutes. Patient medication reviewed. DC or reduce medications that can produce this side effect. Will DC metoprolol and reduce gabapentin only to night time and not during the day. BP log for his next visit.       Objective   Vital Signs:   Vitals:    12/28/23 0837   BP: 120/60   Pulse: 60   Temp: 97.1 °F (36.2 °C)   SpO2: 95%   Weight: 72.6 kg (160 lb)   Height: 177.8 cm (70\")     Body mass index is 22.96 kg/m².    Wt Readings from Last 3 Encounters:   12/28/23 72.6 kg (160 lb)   09/27/23 77.7 kg (171 lb 6.4 oz)   09/19/23 78.6 kg (173 lb 6 oz)     BP Readings from Last 3 Encounters:   12/28/23 120/60   09/27/23 117/68   09/19/23 121/57       Health Maintenance   Topic Date Due    URINE MICROALBUMIN  Never done    TDAP/TD VACCINES (1 - Tdap) Never done    ZOSTER VACCINE (2 of 2) 10/09/2018    DIABETIC EYE EXAM  10/04/2021    ANNUAL WELLNESS VISIT  04/20/2023    HEMOGLOBIN A1C  03/19/2024    LIPID PANEL  09/19/2024    COVID-19 Vaccine  Completed    INFLUENZA VACCINE  Completed    Pneumococcal Vaccine 65+  Completed       Physical Exam  Vitals reviewed.   HENT:      Head: Normocephalic.      Mouth/Throat:      Mouth: Mucous membranes are moist.   Eyes:      Pupils: Pupils are equal, round, and reactive to light.   Cardiovascular:      Rate and Rhythm: Normal rate.   Abdominal:      General: Abdomen is flat.   Musculoskeletal:         General: Normal " range of motion.      Cervical back: Normal range of motion.   Skin:     General: Skin is warm.      Capillary Refill: Capillary refill takes less than 2 seconds.   Neurological:      Mental Status: He is alert.            Result Review :  The following data was reviewed by: Sarath Jernigan MD on 12/28/2023:             Assessment and Plan   Diagnoses and all orders for this visit:    1. Type 2 diabetes mellitus with hyperglycemia, without long-term current use of insulin (Primary)  -     Cancel: Hemoglobin A1c  -     Lipid panel  -     Ambulatory Referral for Diabetic Eye Exam-Ophthalmology  -     MicroAlbumin, Urine, Random - Urine, Clean Catch  -     Comprehensive metabolic panel  -     Hemoglobin A1c    2. Vitamin B12 deficiency  Comments:  patient on B12 supplements. repeat B12 next visit and adjust meds.  Orders:  -     Cancel: Vitamin B12  -     Vitamin B12    3. Primary hypertension    4. Hip pain, right  -     gabapentin (NEURONTIN) 300 MG capsule; Take 1 capsule by mouth Every Night.  Dispense: 90 capsule; Refill: 2    Other orders  -     Pneumococcal Conjugate Vaccine 20-Valent (PCV20)  -     aspirin 81 MG EC tablet; Take 1 tablet by mouth Daily.  Dispense: 90 tablet; Refill: 3        BMI is within normal parameters. No other follow-up for BMI required.         FOLLOW UP  No follow-ups on file.  Patient was given instructions and counseling regarding his condition or for health maintenance advice. Please see specific information pulled into the AVS if appropriate.       Sarath Jernigan MD  12/28/23  09:28 EST    CURRENT & DISCONTINUED MEDICATIONS  Current Outpatient Medications   Medication Instructions    Acetaminophen (Tylenol) 325 MG capsule Oral    aspirin 81 mg, Oral, Daily    atorvastatin (LIPITOR) 40 MG tablet TAKE ONE TABLET BY MOUTH EVERY NIGHT AT BEDTIME    donepezil (ARICEPT) 5 mg, Oral, Daily    DULoxetine (CYMBALTA) 60 MG capsule TAKE ONE CAPSULE BY MOUTH DAILY     gabapentin (NEURONTIN) 300 mg, Oral, Nightly    glucose blood (Glucose Meter Test) test strip check blood sugar every AM fasting and record. (Dx: E11.9)    glucose monitor monitoring kit check blood sugar every AM fasting and record. (Dx: E11.9)    Lancets 33G misc 33 g, Does not apply, Daily    metFORMIN ER (GLUCOPHAGE-XR) 500 mg, Oral, Daily With Breakfast    Omega-3 Fatty Acids (FISH OIL CONCENTRATE PO) Oral       Medications Discontinued During This Encounter   Medication Reason    metoprolol succinate XL (TOPROL-XL) 25 MG 24 hr tablet *Therapy completed    Omega-3 Fatty Acids (fish oil) 1000 MG capsule capsule *Therapy completed    gabapentin (NEURONTIN) 300 MG capsule Reorder

## 2024-01-25 ENCOUNTER — CLINICAL SUPPORT (OUTPATIENT)
Dept: FAMILY MEDICINE CLINIC | Facility: CLINIC | Age: 79
End: 2024-01-25
Payer: MEDICARE

## 2024-01-25 DIAGNOSIS — E53.8 VITAMIN B12 DEFICIENCY: Primary | ICD-10-CM

## 2024-01-25 RX ADMIN — CYANOCOBALAMIN 1000 MCG: 1000 INJECTION, SOLUTION INTRAMUSCULAR; SUBCUTANEOUS at 10:28

## 2024-01-25 NOTE — PROGRESS NOTES
Injection  Injection performed in right arm  by Amira Lopez. Patient tolerated the procedure well without complications.  01/25/24   Amira Lopez

## 2024-02-29 ENCOUNTER — CLINICAL SUPPORT (OUTPATIENT)
Dept: FAMILY MEDICINE CLINIC | Facility: CLINIC | Age: 79
End: 2024-02-29
Payer: MEDICARE

## 2024-02-29 DIAGNOSIS — E53.8 VITAMIN B12 DEFICIENCY: Primary | ICD-10-CM

## 2024-02-29 RX ADMIN — CYANOCOBALAMIN 1000 MCG: 1000 INJECTION, SOLUTION INTRAMUSCULAR; SUBCUTANEOUS at 09:15

## 2024-02-29 NOTE — PROGRESS NOTES
Injection  Injection performed in left arm by Amira Lopez. Patient tolerated the procedure well without complications.  02/29/24   Amira Lopez

## 2024-03-21 ENCOUNTER — CLINICAL SUPPORT (OUTPATIENT)
Dept: FAMILY MEDICINE CLINIC | Facility: CLINIC | Age: 79
End: 2024-03-21
Payer: MEDICARE

## 2024-03-21 DIAGNOSIS — E11.65 TYPE 2 DIABETES MELLITUS WITH HYPERGLYCEMIA, WITHOUT LONG-TERM CURRENT USE OF INSULIN: ICD-10-CM

## 2024-03-21 DIAGNOSIS — I10 PRIMARY HYPERTENSION: ICD-10-CM

## 2024-03-21 LAB
ALBUMIN SERPL-MCNC: 4.5 G/DL (ref 3.5–5.2)
ALBUMIN/GLOB SERPL: 1.7 G/DL
ALP SERPL-CCNC: 85 U/L (ref 39–117)
ALT SERPL W P-5'-P-CCNC: 22 U/L (ref 1–41)
ANION GAP SERPL CALCULATED.3IONS-SCNC: 13 MMOL/L (ref 5–15)
AST SERPL-CCNC: 22 U/L (ref 1–40)
BASOPHILS # BLD AUTO: 0.07 10*3/MM3 (ref 0–0.2)
BASOPHILS NFR BLD AUTO: 0.8 % (ref 0–1.5)
BILIRUB SERPL-MCNC: 0.7 MG/DL (ref 0–1.2)
BUN SERPL-MCNC: 20 MG/DL (ref 8–23)
BUN/CREAT SERPL: 17.1 (ref 7–25)
CALCIUM SPEC-SCNC: 10.2 MG/DL (ref 8.6–10.5)
CHLORIDE SERPL-SCNC: 103 MMOL/L (ref 98–107)
CO2 SERPL-SCNC: 26 MMOL/L (ref 22–29)
CREAT SERPL-MCNC: 1.17 MG/DL (ref 0.76–1.27)
DEPRECATED RDW RBC AUTO: 44.4 FL (ref 37–54)
EGFRCR SERPLBLD CKD-EPI 2021: 63 ML/MIN/1.73
EOSINOPHIL # BLD AUTO: 0.11 10*3/MM3 (ref 0–0.4)
EOSINOPHIL NFR BLD AUTO: 1.3 % (ref 0.3–6.2)
ERYTHROCYTE [DISTWIDTH] IN BLOOD BY AUTOMATED COUNT: 12.7 % (ref 12.3–15.4)
GLOBULIN UR ELPH-MCNC: 2.6 GM/DL
GLUCOSE SERPL-MCNC: 78 MG/DL (ref 65–99)
HBA1C MFR BLD: 6.1 % (ref 4.8–5.6)
HCT VFR BLD AUTO: 41 % (ref 37.5–51)
HGB BLD-MCNC: 14.3 G/DL (ref 13–17.7)
IMM GRANULOCYTES # BLD AUTO: 0.02 10*3/MM3 (ref 0–0.05)
IMM GRANULOCYTES NFR BLD AUTO: 0.2 % (ref 0–0.5)
LYMPHOCYTES # BLD AUTO: 1.98 10*3/MM3 (ref 0.7–3.1)
LYMPHOCYTES NFR BLD AUTO: 22.8 % (ref 19.6–45.3)
MCH RBC QN AUTO: 33.3 PG (ref 26.6–33)
MCHC RBC AUTO-ENTMCNC: 34.9 G/DL (ref 31.5–35.7)
MCV RBC AUTO: 95.3 FL (ref 79–97)
MONOCYTES # BLD AUTO: 0.88 10*3/MM3 (ref 0.1–0.9)
MONOCYTES NFR BLD AUTO: 10.1 % (ref 5–12)
NEUTROPHILS NFR BLD AUTO: 5.64 10*3/MM3 (ref 1.7–7)
NEUTROPHILS NFR BLD AUTO: 64.8 % (ref 42.7–76)
NRBC BLD AUTO-RTO: 0 /100 WBC (ref 0–0.2)
PLATELET # BLD AUTO: 280 10*3/MM3 (ref 140–450)
PMV BLD AUTO: 11.7 FL (ref 6–12)
POTASSIUM SERPL-SCNC: 4.6 MMOL/L (ref 3.5–5.2)
PROT SERPL-MCNC: 7.1 G/DL (ref 6–8.5)
RBC # BLD AUTO: 4.3 10*6/MM3 (ref 4.14–5.8)
SODIUM SERPL-SCNC: 142 MMOL/L (ref 136–145)
WBC NRBC COR # BLD AUTO: 8.7 10*3/MM3 (ref 3.4–10.8)

## 2024-03-21 PROCEDURE — 36415 COLL VENOUS BLD VENIPUNCTURE: CPT | Performed by: STUDENT IN AN ORGANIZED HEALTH CARE EDUCATION/TRAINING PROGRAM

## 2024-03-21 PROCEDURE — 85025 COMPLETE CBC W/AUTO DIFF WBC: CPT | Performed by: STUDENT IN AN ORGANIZED HEALTH CARE EDUCATION/TRAINING PROGRAM

## 2024-03-21 PROCEDURE — 83036 HEMOGLOBIN GLYCOSYLATED A1C: CPT | Performed by: STUDENT IN AN ORGANIZED HEALTH CARE EDUCATION/TRAINING PROGRAM

## 2024-03-21 PROCEDURE — 80053 COMPREHEN METABOLIC PANEL: CPT | Performed by: STUDENT IN AN ORGANIZED HEALTH CARE EDUCATION/TRAINING PROGRAM

## 2024-03-25 ENCOUNTER — TELEPHONE (OUTPATIENT)
Dept: FAMILY MEDICINE CLINIC | Facility: CLINIC | Age: 79
End: 2024-03-25
Payer: MEDICARE

## 2024-03-25 NOTE — TELEPHONE ENCOUNTER
HUB TO RELAY:  PLEASE CALL OUR OFFICE [phone] TO RESCHEDULE YOUR APPOINTMENT THAT IS ON MARCH 27, @ 12 NOON.  DR. PONCE IS GOING TO BE OUT OF THE OFFICE THAT DAY.  THANK YOU

## 2024-03-29 ENCOUNTER — CLINICAL SUPPORT (OUTPATIENT)
Dept: FAMILY MEDICINE CLINIC | Facility: CLINIC | Age: 79
End: 2024-03-29
Payer: MEDICARE

## 2024-03-29 DIAGNOSIS — E53.8 VITAMIN B12 DEFICIENCY: Primary | ICD-10-CM

## 2024-03-29 RX ADMIN — CYANOCOBALAMIN 1000 MCG: 1000 INJECTION, SOLUTION INTRAMUSCULAR; SUBCUTANEOUS at 10:53

## 2024-03-29 NOTE — PROGRESS NOTES
Injection  Injection performed in RIGHT ARM by Amira Lopez. Patient tolerated the procedure well without complications.  03/29/24   Amira Lopez

## 2024-04-04 ENCOUNTER — OFFICE VISIT (OUTPATIENT)
Dept: FAMILY MEDICINE CLINIC | Facility: CLINIC | Age: 79
End: 2024-04-04
Payer: MEDICARE

## 2024-04-04 VITALS
SYSTOLIC BLOOD PRESSURE: 124 MMHG | HEIGHT: 70 IN | OXYGEN SATURATION: 100 % | BODY MASS INDEX: 22.33 KG/M2 | DIASTOLIC BLOOD PRESSURE: 72 MMHG | HEART RATE: 63 BPM | TEMPERATURE: 97.9 F | WEIGHT: 156 LBS

## 2024-04-04 DIAGNOSIS — E78.2 MIXED HYPERLIPIDEMIA: ICD-10-CM

## 2024-04-04 DIAGNOSIS — E11.65 TYPE 2 DIABETES MELLITUS WITH HYPERGLYCEMIA, WITHOUT LONG-TERM CURRENT USE OF INSULIN: Primary | ICD-10-CM

## 2024-04-04 DIAGNOSIS — M25.551 HIP PAIN, RIGHT: ICD-10-CM

## 2024-04-04 DIAGNOSIS — E78.00 HIGH CHOLESTEROL: ICD-10-CM

## 2024-04-04 DIAGNOSIS — I10 PRIMARY HYPERTENSION: ICD-10-CM

## 2024-04-04 DIAGNOSIS — F33.0 DEPRESSION, MAJOR, RECURRENT, MILD: ICD-10-CM

## 2024-04-04 PROBLEM — D51.9 ANEMIA DUE TO VITAMIN B12 DEFICIENCY: Status: ACTIVE | Noted: 2023-04-04

## 2024-04-04 PROCEDURE — 3078F DIAST BP <80 MM HG: CPT | Performed by: STUDENT IN AN ORGANIZED HEALTH CARE EDUCATION/TRAINING PROGRAM

## 2024-04-04 PROCEDURE — 99214 OFFICE O/P EST MOD 30 MIN: CPT | Performed by: STUDENT IN AN ORGANIZED HEALTH CARE EDUCATION/TRAINING PROGRAM

## 2024-04-04 PROCEDURE — 3074F SYST BP LT 130 MM HG: CPT | Performed by: STUDENT IN AN ORGANIZED HEALTH CARE EDUCATION/TRAINING PROGRAM

## 2024-04-04 PROCEDURE — 1160F RVW MEDS BY RX/DR IN RCRD: CPT | Performed by: STUDENT IN AN ORGANIZED HEALTH CARE EDUCATION/TRAINING PROGRAM

## 2024-04-04 PROCEDURE — 1159F MED LIST DOCD IN RCRD: CPT | Performed by: STUDENT IN AN ORGANIZED HEALTH CARE EDUCATION/TRAINING PROGRAM

## 2024-04-04 RX ORDER — METFORMIN HYDROCHLORIDE 500 MG/1
500 TABLET, EXTENDED RELEASE ORAL
Qty: 90 TABLET | Refills: 4 | Status: SHIPPED | OUTPATIENT
Start: 2024-04-04

## 2024-04-04 RX ORDER — GABAPENTIN 300 MG/1
300 CAPSULE ORAL NIGHTLY
Qty: 90 CAPSULE | Refills: 5 | Status: SHIPPED | OUTPATIENT
Start: 2024-04-04

## 2024-04-04 RX ORDER — ASPIRIN 81 MG/1
81 TABLET ORAL DAILY
Qty: 90 TABLET | Refills: 4 | Status: SHIPPED | OUTPATIENT
Start: 2024-04-04

## 2024-04-04 RX ORDER — DULOXETIN HYDROCHLORIDE 60 MG/1
60 CAPSULE, DELAYED RELEASE ORAL DAILY
Qty: 90 CAPSULE | Refills: 4 | Status: SHIPPED | OUTPATIENT
Start: 2024-04-04

## 2024-04-04 RX ORDER — ATORVASTATIN CALCIUM 40 MG/1
40 TABLET, FILM COATED ORAL
Qty: 90 TABLET | Refills: 4 | Status: SHIPPED | OUTPATIENT
Start: 2024-04-04

## 2024-04-04 RX ORDER — PREGABALIN 100 MG/1
100 CAPSULE ORAL DAILY
COMMUNITY

## 2024-04-04 NOTE — PROGRESS NOTES
"Chief Complaint  follow up labs and Dizziness (Occasional dizziness.  But has found that if he eats a piece of hard candy then will feel better. )    Subjective      Dioni Macias is a 78 y.o. male who presents to [facility]     Dioni Macias is also being seen today for diabetes.     Patient refers occasional dizziness spell that resolve after eating a candy. Refers glycemia levels around 90 when episodes happen. Likely due to long standing hyperglycemia now that glycemia 90 (normal in non diabetic patient) this represent low values in glycemia to a patient with long standing uncontrolled diabetes. Recommend eating small portion more frequent as 5- 6 times a day. Continue healthy habits.     Objective   Vital Signs:   Vitals:    04/04/24 0747   BP: 124/72   BP Location: Left arm   Cuff Size: Adult   Pulse: 63   Temp: 97.9 °F (36.6 °C)   SpO2: 100%   Weight: 70.8 kg (156 lb)   Height: 177.8 cm (70\")     Body mass index is 22.38 kg/m².    Wt Readings from Last 3 Encounters:   04/04/24 70.8 kg (156 lb)   12/28/23 72.6 kg (160 lb)   09/27/23 77.7 kg (171 lb 6.4 oz)     BP Readings from Last 3 Encounters:   04/04/24 124/72   12/28/23 120/60   09/27/23 117/68       Health Maintenance   Topic Date Due    TDAP/TD VACCINES (1 - Tdap) Never done    RSV Vaccine - Adults (1 - 1-dose 60+ series) Never done    ZOSTER VACCINE (2 of 2) 10/09/2018    DIABETIC EYE EXAM  01/31/2020    INFLUENZA VACCINE  08/01/2024    HEMOGLOBIN A1C  09/21/2024    ANNUAL WELLNESS VISIT  12/28/2024    LIPID PANEL  12/28/2024    URINE MICROALBUMIN  12/28/2024    COVID-19 Vaccine  Completed    Pneumococcal Vaccine 65+  Completed       Physical Exam  Vitals reviewed.   HENT:      Head: Normocephalic.      Mouth/Throat:      Mouth: Mucous membranes are moist.   Eyes:      Pupils: Pupils are equal, round, and reactive to light.   Cardiovascular:      Rate and Rhythm: Normal rate.   Abdominal:      General: Abdomen " is flat.   Musculoskeletal:         General: Normal range of motion.      Cervical back: Normal range of motion.   Skin:     General: Skin is warm.      Capillary Refill: Capillary refill takes less than 2 seconds.   Neurological:      Mental Status: He is alert.            Assessment & Plan  Type 2 diabetes mellitus with hyperglycemia, without long-term current use of insulin  Diabetes is stable.   Continue current treatment regimen.  Diabetes will be reassessed in 6 months  Hip pain, right    Depression, major, recurrent, mild  Patient's depression is a single episode that is mild without psychosis. Depression is in full remission and stable.    Plan:   Continue current medication therapy     Followup in 6 months.   Mixed hyperlipidemia   Lipid abnormalities are stable    Plan:  Continue same medication/s without change.      Discussed medication dosage, use, side effects, and goals of treatment in detail.    Counseled patient on lifestyle modifications to help control hyperlipidemia.     Patient Treatment Goals:   LDL goal is less than 70    Followup in 6 months.  Primary hypertension  Hypertension is stable and controlled  Continue current treatment regimen.  Blood pressure will be reassessed in 6 months.  High cholesterol   Lipid abnormalities are stable    Plan:  Continue same medication/s without change.      Discussed medication dosage, use, side effects, and goals of treatment in detail.    Counseled patient on lifestyle modifications to help control hyperlipidemia.     Patient Treatment Goals:   LDL goal is less than 70    Followup in 6 months.             BMI is within normal parameters. No other follow-up for BMI required.       I spent 30 minutes caring for Dioni on this date of service. This time includes time spent by me in the following activities:preparing for the visit, reviewing tests, obtaining and/or reviewing a separately obtained history, performing a medically appropriate examination and/or  evaluation , counseling and educating the patient/family/caregiver, ordering medications, tests, or procedures, referring and communicating with other health care professionals , documenting information in the medical record, independently interpreting results and communicating that information with the patient/family/caregiver, and care coordination  FOLLOW UP  No follow-ups on file.  Patient was given instructions and counseling regarding his condition or for health maintenance advice. Please see specific information pulled into the AVS if appropriate.       Sarath Jernigan MD  04/04/24  07:50 EDT    CURRENT & DISCONTINUED MEDICATIONS  Current Outpatient Medications   Medication Instructions    Acetaminophen (Tylenol) 325 MG capsule Oral    aspirin 81 mg, Oral, Daily    atorvastatin (LIPITOR) 40 MG tablet TAKE ONE TABLET BY MOUTH EVERY NIGHT AT BEDTIME    donepezil (ARICEPT) 5 mg, Oral, Daily    DULoxetine (CYMBALTA) 60 MG capsule TAKE ONE CAPSULE BY MOUTH DAILY    gabapentin (NEURONTIN) 300 mg, Oral, Nightly    glucose blood (Glucose Meter Test) test strip check blood sugar every AM fasting and record. (Dx: E11.9)    glucose monitor monitoring kit check blood sugar every AM fasting and record. (Dx: E11.9)    metFORMIN ER (GLUCOPHAGE-XR) 500 mg, Oral, Daily With Breakfast    Omega-3 Fatty Acids (FISH OIL CONCENTRATE PO) Oral       There are no discontinued medications.

## 2024-04-04 NOTE — ASSESSMENT & PLAN NOTE
Patient's depression is a single episode that is mild without psychosis. Depression is in full remission and stable.    Plan:   Continue current medication therapy     Followup in 6 months.

## 2024-04-26 ENCOUNTER — CLINICAL SUPPORT (OUTPATIENT)
Dept: FAMILY MEDICINE CLINIC | Facility: CLINIC | Age: 79
End: 2024-04-26
Payer: MEDICARE

## 2024-04-26 DIAGNOSIS — E53.8 VITAMIN B12 DEFICIENCY: Primary | ICD-10-CM

## 2024-04-26 RX ADMIN — CYANOCOBALAMIN 1000 MCG: 1000 INJECTION, SOLUTION INTRAMUSCULAR; SUBCUTANEOUS at 10:18

## 2024-04-26 NOTE — PROGRESS NOTES
Injection  Injection performed in LEFT ARM by Amira Lopez. Patient tolerated the procedure well without complications.  04/26/24   Amira Lopez

## 2024-05-03 ENCOUNTER — OFFICE VISIT (OUTPATIENT)
Dept: ORTHOPEDIC SURGERY | Facility: CLINIC | Age: 79
End: 2024-05-03
Payer: MEDICARE

## 2024-05-03 ENCOUNTER — OFFICE VISIT (OUTPATIENT)
Dept: FAMILY MEDICINE CLINIC | Facility: CLINIC | Age: 79
End: 2024-05-03
Payer: MEDICARE

## 2024-05-03 VITALS
WEIGHT: 156 LBS | TEMPERATURE: 97.1 F | HEIGHT: 70 IN | BODY MASS INDEX: 22.33 KG/M2 | DIASTOLIC BLOOD PRESSURE: 63 MMHG | SYSTOLIC BLOOD PRESSURE: 111 MMHG

## 2024-05-03 VITALS
WEIGHT: 156 LBS | HEIGHT: 70 IN | BODY MASS INDEX: 22.33 KG/M2 | OXYGEN SATURATION: 96 % | DIASTOLIC BLOOD PRESSURE: 64 MMHG | SYSTOLIC BLOOD PRESSURE: 108 MMHG | HEART RATE: 52 BPM

## 2024-05-03 DIAGNOSIS — S49.92XA INJURY OF LEFT SHOULDER, INITIAL ENCOUNTER: ICD-10-CM

## 2024-05-03 DIAGNOSIS — I10 PRIMARY HYPERTENSION: ICD-10-CM

## 2024-05-03 DIAGNOSIS — Z79.899 MEDICATION MANAGEMENT: ICD-10-CM

## 2024-05-03 DIAGNOSIS — E78.2 MIXED HYPERLIPIDEMIA: ICD-10-CM

## 2024-05-03 DIAGNOSIS — M51.36 DDD (DEGENERATIVE DISC DISEASE), LUMBAR: ICD-10-CM

## 2024-05-03 DIAGNOSIS — M25.551 HIP PAIN, RIGHT: ICD-10-CM

## 2024-05-03 DIAGNOSIS — M54.50 CHRONIC MIDLINE LOW BACK PAIN WITHOUT SCIATICA: ICD-10-CM

## 2024-05-03 DIAGNOSIS — G89.29 CHRONIC MIDLINE LOW BACK PAIN WITHOUT SCIATICA: ICD-10-CM

## 2024-05-03 DIAGNOSIS — E11.65 TYPE 2 DIABETES MELLITUS WITH HYPERGLYCEMIA, WITHOUT LONG-TERM CURRENT USE OF INSULIN: Primary | ICD-10-CM

## 2024-05-03 DIAGNOSIS — M25.512 LEFT SHOULDER PAIN, UNSPECIFIED CHRONICITY: Primary | ICD-10-CM

## 2024-05-03 PROBLEM — M51.369 DDD (DEGENERATIVE DISC DISEASE), LUMBAR: Status: ACTIVE | Noted: 2024-05-03

## 2024-05-03 RX ORDER — GABAPENTIN 300 MG/1
300 CAPSULE ORAL NIGHTLY
Qty: 90 CAPSULE | Refills: 1 | Status: SHIPPED | OUTPATIENT
Start: 2024-05-03

## 2024-05-03 RX ORDER — MELOXICAM 7.5 MG/1
7.5 TABLET ORAL DAILY
Qty: 90 TABLET | Refills: 1 | Status: SHIPPED | OUTPATIENT
Start: 2024-05-03

## 2024-05-03 NOTE — PROGRESS NOTES
"Chief Complaint  Initial Evaluation and Pain of the Left Shoulder     Subjective      Dioni Macias presents to [facility] for initial evaluation of the left shoulder. He had a fall and had an X ray on  and is here to review.  He has decrease ROM and pain in the shoulder.      No Known Allergies     Social History     Socioeconomic History    Marital status:    Tobacco Use    Smoking status: Former     Current packs/day: 0.00     Average packs/day: 1 pack/day for 30.0 years (30.0 ttl pk-yrs)     Types: Cigarettes     Start date:      Quit date:      Years since quittin.3    Smokeless tobacco: Former     Types: Chew, Snuff     Quit date:    Vaping Use    Vaping status: Never Used   Substance and Sexual Activity    Alcohol use: Yes     Alcohol/week: 2.0 standard drinks of alcohol     Types: 2 Cans of beer per week    Drug use: Never     Comment: gabapentin    Sexual activity: Defer        I reviewed the patient's chief complaint, history of present illness, review of systems, past medical history, surgical history, family history, social history, medications, and allergy list.     Review of Systems     Constitutional: Denies fevers, chills, weight loss  Cardiovascular: Denies chest pain, shortness of breath  Skin: Denies rashes, acute skin changes  Neurologic: Denies headache, loss of consciousness        Vital Signs:   /64   Pulse 52   Ht 177.8 cm (70\")   Wt 70.8 kg (156 lb)   SpO2 96%   BMI 22.38 kg/m²          Physical Exam  General: Alert. No acute distress    Ortho Exam        LEFT SHOULDER Forward flexion 140 with pain. Abduction 90 with pain. External rotation 30. Internal rotation Side of hip. Positive Cross body adduction. Supraspinatus strength 4/5. Infraspinatus Strength 4/5. Infrared subscap 4/5. Positive Lopez. Negative Neer. Negative Apprehension. Negative Lift off. (Negative Obriens. Sensation intact to light touch, median, " radial, ulnar nerve. Positive AIN, PIN, ulnar nerve motor. Positive pulses. Positive Impingement signs. Good strength in triceps, biceps, deltoid, wrist extensors and wrist flexors. Tender to palpation to the anterior aspect of the shoulder and down the arm.        Procedures        Imaging Results (Most Recent)       None             Result Review :         XR Shoulder 2+ View Left    Result Date: 4/27/2024  Narrative: XR SHOULDER 2+ VW LEFT-  Date of Exam: 4/27/2024 12:23 PM  Indication: Left shoulder pain s/p fall while walking dogs yesterday; M25.512-Pain in left shoulder  Comparison: None available.  Findings: No acute fracture is identified. No focal osseous abnormality is identified. There are mild degenerative changes along the glenohumeral joint, and moderate degenerative changes along the acromioclavicular joint. The soft tissues are unremarkable.      Impression: Impression: No acute fracture or traumatic malalignment identified.   Electronically Signed By-Yang Koenig MD On:4/27/2024 1:33 PM              Assessment and Plan     Diagnoses and all orders for this visit:    1. Left shoulder pain, unspecified chronicity (Primary)    2. Injury of left shoulder, initial encounter        Discussed the treatment plan with the patient. I reviewed the X-rays that were obtained today with the patient.     MRI of the left shoulder to assess the structure.       Call or return if worsening symptoms.    Follow Up     After MRI of the left shoulder.       Patient was given instructions and counseling regarding his condition or for health maintenance advice. Please see specific information pulled into the AVS if appropriate.     Scribed for Garland Wang MD by Amira Waters MA.  05/03/24   15:34 EDT      I have personally performed the services described in this document as scribed by the above individual and it is both accurate and complete. Garland Wang MD 05/03/24

## 2024-05-03 NOTE — PROGRESS NOTES
New Patient Office Visit      Patient Name: Dioni Macias  : 1945   MRN: [id number]     Chief Complaint:    Chief Complaint   Patient presents with    Depression    Heartburn    Hypertension    Hyperlipidemia    Diabetes    Anemia       History of Present Illness: Dioni Macias is a 78 y.o. male who is here today to establish care and continuation of care for DM2, anemia, HTN, hyperlipidemia, GERD, depression.    A1C- 3/2024  Eye exam- 2024  Foot exam- checks at home    C/o He had a fall a few weeks ago. He was walking and his foot went off the side of the [place] and he fell and hit his face and LT shoulder. His face is healing and his shoulder is still sore.   He denies headaches and dizziness.    Follow up urgent care 2024    Fall        Fell yesterday - injured left shoulder, cannot raise arm up at all  Some abrasions on face -fell onto [place]       Dioni Macias is a 78 y.o. male  presents with complaint of left shoulder pain.  Patient states yesterday while walking his son-in-law's dogs, he fell.  Patient states he fell on [place].  He has an abrasion to his right forehead and nose.  Patient states he is having pain to his left shoulder and is not able to raise the left arm up due to pain.  Rates pain 10 out of 10 with activity and 5 out of 10 on numeric scale with rest.  Has been taking over-the-counter ibuprofen.  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   main concern Is his left shoulder, consult with dr baca this afternoon      He also c/o low back pain radiation RT hip pain. This has been on going and Dr. Casillas had him on Gabapentin TID. He went down to 1 a day due to dizziness  He would like to go back on it.  Has increased to twice a day at this time but has not helped this pain and is back history of degenerative disc disease  X-ray lumbar spine  MPRESSION:               Multilevel lumbar degenerative disc disease most  severe at L5-S1      on 2022 at 15:21              Subjective      Review of Systems:   Review of Systems   Constitutional:  Negative for fever.   HENT:  Negative for ear pain and sore throat.    Eyes:  Negative for visual disturbance.   Respiratory:  Negative for cough.    Cardiovascular:  Negative for chest pain.   Musculoskeletal:  Positive for back pain.   Neurological:  Negative for headaches.        Past Medical History:   Past Medical History:   Diagnosis Date    Diabetes mellitus        Past Surgical History: No past surgical history on file.    Family History: No family history on file.    Social History:   Social History     Socioeconomic History    Marital status:    Tobacco Use    Smoking status: Former     Current packs/day: 0.00     Average packs/day: 1 pack/day for 30.0 years (30.0 ttl pk-yrs)     Types: Cigarettes     Start date:      Quit date:      Years since quittin.3    Smokeless tobacco: Former     Types: Chew, Snuff     Quit date:    Vaping Use    Vaping status: Never Used   Substance and Sexual Activity    Alcohol use: Yes     Alcohol/week: 2.0 standard drinks of alcohol     Types: 2 Cans of beer per week    Drug use: Never     Comment: gabapentin    Sexual activity: Defer       Medications:     Current Outpatient Medications:     Acetaminophen (Tylenol) 325 MG capsule, Take  by mouth., Disp: , Rfl:     aspirin 81 MG EC tablet, Take 1 tablet by mouth Daily., Disp: 90 tablet, Rfl: 4    atorvastatin (LIPITOR) 40 MG tablet, Take 1 tablet by mouth every night at bedtime., Disp: 90 tablet, Rfl: 4    donepezil (ARICEPT) 5 MG tablet, Take 1 tablet by mouth Daily., Disp: , Rfl:     DULoxetine (CYMBALTA) 60 MG capsule, Take 1 capsule by mouth Daily., Disp: 90 capsule, Rfl: 4    gabapentin (NEURONTIN) 300 MG capsule, Take 1 capsule by mouth Every Night., Disp: 90 capsule, Rfl: 1    glucose blood (Glucose Meter Test) test strip, check blood sugar every AM fasting and record.  "(Dx: E11.9), Disp: 100 each, Rfl: 3    glucose monitor monitoring kit, check blood sugar every AM fasting and record. (Dx: E11.9), Disp: 1 each, Rfl: 0    metFORMIN ER (GLUCOPHAGE-XR) 500 MG 24 hr tablet, Take 1 tablet by mouth Daily With Breakfast., Disp: 90 tablet, Rfl: 4    Omega-3 Fatty Acids (FISH OIL CONCENTRATE PO), Take  by mouth., Disp: , Rfl:     pregabalin (LYRICA) 100 MG capsule, Take 1 capsule by mouth Daily., Disp: , Rfl:     meloxicam (Mobic) 7.5 MG tablet, Take 1 tablet by mouth Daily., Disp: 90 tablet, Rfl: 1    Current Facility-Administered Medications:     cyanocobalamin injection 1,000 mcg, 1,000 mcg, Subcutaneous, Q28 Days, Sarath Yang MD, 1,000 mcg at 04/26/24 1018    cyanocobalamin injection 1,000 mcg, 1,000 mcg, Intramuscular, Q28 Days, Sarath Yang MD    Allergies:   No Known Allergies    Objective     Physical Exam:  Vital Signs:   Vitals:    05/03/24 0752 05/03/24 0758   BP: (!) 113/35 111/63   BP Location: Right arm Left arm   Temp: 97.1 °F (36.2 °C)    Weight: 70.8 kg (156 lb)    Height: 177.8 cm (70\")      Body mass index is 22.38 kg/m².     Physical Exam  HENT:      Head: Normocephalic.      Right Ear: Tympanic membrane normal.      Left Ear: Tympanic membrane normal.      Nose: Nose normal.      Mouth/Throat:      Mouth: Mucous membranes are moist.   Eyes:      Conjunctiva/sclera: Conjunctivae normal.   Cardiovascular:      Rate and Rhythm: Normal rate and regular rhythm.      Heart sounds: Normal heart sounds. No murmur heard.  Pulmonary:      Effort: Pulmonary effort is normal.      Breath sounds: Normal breath sounds.   Abdominal:      General: Bowel sounds are normal.      Palpations: Abdomen is soft.   Musculoskeletal:      Lumbar back: Tenderness present. No swelling or edema. Normal range of motion. Negative right straight leg raise test and negative left straight leg raise test.        Back:       Right lower leg: No edema.      Left lower leg: No edema. "      Comments: Internal/external rotation bilateral hips without pain   Skin:     General: Skin is warm and dry.   Neurological:      Mental Status: He is alert.      Gait: Gait normal.   Psychiatric:         Mood and Affect: Mood normal.         Behavior: Behavior normal.             Assessment / Plan      Assessment/Plan:   Diagnoses and all orders for this visit:    1. Type 2 diabetes mellitus with hyperglycemia, without long-term current use of insulin (Primary)  -     CBC Auto Differential; Future  -     Comprehensive Metabolic Panel; Future  -     Microalbumin / Creatinine Urine Ratio - Urine, Clean Catch; Future  -     Hemoglobin A1c; Future  -     TSH; Future  -     Urinalysis With Culture If Indicated -; Future    2. Primary hypertension  -     Microalbumin / Creatinine Urine Ratio - Urine, Clean Catch; Future    3. Mixed hyperlipidemia  -     Comprehensive Metabolic Panel; Future  -     Lipid Panel; Future    4. Hip pain, right  -     gabapentin (NEURONTIN) 300 MG capsule; Take 1 capsule by mouth Every Night.  Dispense: 90 capsule; Refill: 1    5. DDD (degenerative disc disease), lumbar    6. Chronic midline low back pain without sciatica    7. Medication management  -     Urine Drug Screen - Urine, Clean Catch; Future    Other orders  -     meloxicam (Mobic) 7.5 MG tablet; Take 1 tablet by mouth Daily.  Dispense: 90 tablet; Refill: 1         Diabetes mellitus type 2 will obtain hemoglobin A1c to monitor  Hypertension currently controlled without medication at this time  Hyperlipidemia will obtain lipid panel and CMP to monitor current statin dose Lipitor 40 mg at nighttime denies myalgias  Right hip pain degenerative disc disease lumbar spine with chronic low back pain we will provide a refill of gabapentin at nighttime we will not increase it as patient suffers from intermittent dizziness will add meloxicam 7.5 mg once daily recommended to take with food reviewed renal function most recent CMP  "normal      Follow Up:   Return in about 3 months (around 8/3/2024).    Julieth Gray, APRN      \"Please note that portions of this note were completed with a voice recognition program.\"    "

## 2024-05-24 ENCOUNTER — CLINICAL SUPPORT (OUTPATIENT)
Dept: FAMILY MEDICINE CLINIC | Facility: CLINIC | Age: 79
End: 2024-05-24
Payer: MEDICARE

## 2024-05-24 DIAGNOSIS — R79.89 LOW VITAMIN B12 LEVEL: Primary | ICD-10-CM

## 2024-05-24 PROCEDURE — 96372 THER/PROPH/DIAG INJ SC/IM: CPT | Performed by: NURSE PRACTITIONER

## 2024-05-24 RX ADMIN — CYANOCOBALAMIN 1000 MCG: 1000 INJECTION, SOLUTION INTRAMUSCULAR; SUBCUTANEOUS at 10:28

## 2024-05-24 NOTE — PROGRESS NOTES
Patient came in for his B12 injection today. We provided the B12 injection medication. He received 1 mL in his right deltoid and tolerated it well.

## 2024-06-04 ENCOUNTER — TELEPHONE (OUTPATIENT)
Dept: ORTHOPEDIC SURGERY | Facility: CLINIC | Age: 79
End: 2024-06-04
Payer: COMMERCIAL

## 2024-06-04 NOTE — TELEPHONE ENCOUNTER
Caller: Dioni Macias    Relationship to patient: Self    Best call back number:     Chief complaint: LEFT SHOULDER    Type of visit: FUP     Requested date: 6/10/24     Additional notes:PATIENT HAS MRI SCHEDULED 6/7/24 AND NEEDS FUP.  NEXCT AVAILABLE SHOWS 6/19/24

## 2024-06-07 ENCOUNTER — HOSPITAL ENCOUNTER (OUTPATIENT)
Dept: MRI IMAGING | Facility: HOSPITAL | Age: 79
Discharge: HOME | End: 2024-06-07
Payer: MEDICARE

## 2024-06-07 DIAGNOSIS — S49.92XA INJURY OF LEFT SHOULDER, INITIAL ENCOUNTER: ICD-10-CM

## 2024-06-07 PROCEDURE — 73221 MRI JOINT UPR EXTREM W/O DYE: CPT

## 2024-06-14 DIAGNOSIS — E11.65 TYPE 2 DIABETES MELLITUS WITH HYPERGLYCEMIA, WITHOUT LONG-TERM CURRENT USE OF INSULIN: Primary | ICD-10-CM

## 2024-06-14 RX ORDER — LANCETS 23 GAUGE
EACH MISCELLANEOUS
COMMUNITY
End: 2024-06-14 | Stop reason: SDUPTHER

## 2024-06-14 RX ORDER — LANCETS 23 GAUGE
1 EACH MISCELLANEOUS DAILY
Qty: 100 EACH | Refills: 2 | Status: SHIPPED | OUTPATIENT
Start: 2024-06-14

## 2024-06-19 ENCOUNTER — OFFICE VISIT (OUTPATIENT)
Dept: ORTHOPEDIC SURGERY | Facility: CLINIC | Age: 79
End: 2024-06-19
Payer: MEDICARE

## 2024-06-19 VITALS
OXYGEN SATURATION: 95 % | BODY MASS INDEX: 22.33 KG/M2 | SYSTOLIC BLOOD PRESSURE: 122 MMHG | WEIGHT: 156 LBS | DIASTOLIC BLOOD PRESSURE: 67 MMHG | HEART RATE: 65 BPM | HEIGHT: 70 IN

## 2024-06-19 DIAGNOSIS — M75.112 NONTRAUMATIC INCOMPLETE TEAR OF LEFT ROTATOR CUFF: Primary | ICD-10-CM

## 2024-06-19 RX ORDER — TRIAMCINOLONE ACETONIDE 40 MG/ML
40 INJECTION, SUSPENSION INTRA-ARTICULAR; INTRAMUSCULAR
Status: COMPLETED | OUTPATIENT
Start: 2024-06-19 | End: 2024-06-19

## 2024-06-19 RX ORDER — LIDOCAINE HYDROCHLORIDE 10 MG/ML
5 INJECTION, SOLUTION INFILTRATION; PERINEURAL
Status: COMPLETED | OUTPATIENT
Start: 2024-06-19 | End: 2024-06-19

## 2024-06-19 RX ADMIN — LIDOCAINE HYDROCHLORIDE 5 ML: 10 INJECTION, SOLUTION INFILTRATION; PERINEURAL at 11:33

## 2024-06-19 RX ADMIN — TRIAMCINOLONE ACETONIDE 40 MG: 40 INJECTION, SUSPENSION INTRA-ARTICULAR; INTRAMUSCULAR at 11:33

## 2024-06-19 NOTE — PROGRESS NOTES
"Chief Complaint  Follow-up of the Left Shoulder     Subjective      Dioni Macias presents to [facility] for follow up of the left shoulder.  He had an MRI and is here to review.  He had a fall and had an X ray on 24 and is here to review. He has decrease ROM and pain in the shoulder.     No Known Allergies     Social History     Socioeconomic History    Marital status:    Tobacco Use    Smoking status: Former     Current packs/day: 0.00     Average packs/day: 1 pack/day for 30.0 years (30.0 ttl pk-yrs)     Types: Cigarettes     Start date:      Quit date:      Years since quittin.4    Smokeless tobacco: Former     Types: Chew, Snuff     Quit date:    Vaping Use    Vaping status: Never Used   Substance and Sexual Activity    Alcohol use: Yes     Alcohol/week: 2.0 standard drinks of alcohol     Types: 2 Cans of beer per week    Drug use: Never     Comment: gabapentin    Sexual activity: Defer        I reviewed the patient's chief complaint, history of present illness, review of systems, past medical history, surgical history, family history, social history, medications, and allergy list.     Review of Systems     Constitutional: Denies fevers, chills, weight loss  Cardiovascular: Denies chest pain, shortness of breath  Skin: Denies rashes, acute skin changes  Neurologic: Denies headache, loss of consciousness        Vital Signs:   /67   Pulse 65   Ht 177.8 cm (70\")   Wt 70.8 kg (156 lb)   SpO2 95%   BMI 22.38 kg/m²          Physical Exam  General: Alert. No acute distress    Ortho Exam        LEFT SHOULDER Forward flexion 140 AROM with pain. AAROM to 170 Abduction 90 with pain. External rotation 30. Internal rotation Side of hip. Positive Cross body adduction. Supraspinatus strength 4/5. Infraspinatus Strength 4/5. Infrared subscap 4/5. Positive Lopez. Negative Neer. Negative Apprehension. Negative Lift off. (Negative Obriens. Sensation " intact to light touch, median, radial, ulnar nerve. Positive AIN, PIN, ulnar nerve motor. Positive pulses. Positive Impingement signs. Good strength in triceps, biceps, deltoid, wrist extensors and wrist flexors. Tender to palpation to the anterior aspect of the shoulder and down the arm.        Large Joint Arthrocentesis: L subacromial bursa  Date/Time: 6/19/2024 11:33 AM  Consent given by: patient  Site marked: site marked  Timeout: Immediately prior to procedure a time out was called to verify the correct patient, procedure, equipment, support staff and site/side marked as required   Supporting Documentation  Indications: pain   Procedure Details  Location: shoulder - L subacromial bursa  Preparation: Patient was prepped and draped in the usual sterile fashion  Needle gauge: 21 G.  Medications administered: 5 mL lidocaine 1 %; 40 mg triamcinolone acetonide 40 MG/ML  Patient tolerance: patient tolerated the procedure well with no immediate complications    This injection documentation was Scribed for Garland Wang MD by Rupali Vergara.  06/19/24   11:34 EDT        Imaging Results (Most Recent)       None             Result Review :         MRI Shoulder Left Without Contrast    Result Date: 6/10/2024  Narrative: MRI SHOULDER LEFT WO CONTRAST Date of Exam: 6/7/2024 8:45 AM EDT Indication: left shoulder pain.  Comparison: 3 view left shoulder dated 4/27/2024 Technique:  Routine multiplanar/multisequence images of the left shoulder were obtained without contrast administration.  Findings: No fracture or malalignment marrow signal appears normal. Mild T2 high signal consistent with edema is noted in the anterior aspect of the humeral head. Mild acromioclavicular osteoarthritis is noted. No AC joint effusion is noted. A type II acromion is present. There is a full-thickness tear involving the anterior fibers of the supraspinatus tendon there is approximately 1.2 cm retraction of the tendon fibers. The tear  measures 1.1 cm AP. There is an intrasubstance tear of the distal subscapularis tendon which involves approximately 33% of tendon thickness and measures 0.7 cm x 0.4 cm. Mild subscapularis tendinopathy is noted. The rotator cuff otherwise appears unremarkable. No muscle body atrophy is seen. There is mild to moderate intermediate signal in the biceps long head tendon consistent with tendinopathy. The tendon insertion onto the superior labrum remains intact. And the superior labrum posterior to the biceps anchor is linear intermediate signal abnormality suspected to represent a small tear. The labrum otherwise appears unremarkable. Moderate glenohumeral joint effusion is noted. There is evidence of synovitis within the joint. Cartilage in the joint is intact.     Impression: Impression: 1.Mild acromioclavicular osteoarthritis 2.Full-thickness tear of the supraspinatus tendon, as detailed above. 3.Mild to moderate biceps tendinopathy. 4.Small intrasubstance tear of the distal subscapularis tendon. Mild subscapularis tendinopathy. 5.Suspected small tear of the superior labrum posterior to the biceps anchor. 6.Moderate glenohumeral joint effusion with evidence of synovitis. Electronically Signed: Ángel Zabala MD  6/10/2024 12:11 PM EDT  Workstation ID: [id number]            Assessment and Plan     Diagnoses and all orders for this visit:    1. Nontraumatic incomplete tear of left rotator cuff (Primary)        Discussed the treatment plan with the patient. I reviewed the MRI results with the patient.     Discussed the treatment options with the patient, operative vs non-operative.     Discussed the risks and benefits of conservative measures. The patient expressed understanding and wished to proceed with a left shoulder steroid injection.  He tolerated the injection well.     Prescribed physical therapy.       Call or return if worsening symptoms.    Follow Up     4-6 weeks assess if the injection and physical therapy was  helpful.       Patient was given instructions and counseling regarding his condition or for health maintenance advice. Please see specific information pulled into the AVS if appropriate.     Scribed for Garland Wang MD by Amira Waters MA.  06/19/24   11:19 EDT    I have personally performed the services described in this document as scribed by the above individual and it is both accurate and complete. Garland Wang MD 06/19/24

## 2024-06-28 ENCOUNTER — OFFICE VISIT (OUTPATIENT)
Dept: FAMILY MEDICINE CLINIC | Facility: CLINIC | Age: 79
End: 2024-06-28
Payer: MEDICARE

## 2024-06-28 ENCOUNTER — CLINICAL SUPPORT (OUTPATIENT)
Dept: FAMILY MEDICINE CLINIC | Facility: CLINIC | Age: 79
End: 2024-06-28
Payer: MEDICARE

## 2024-06-28 VITALS
HEIGHT: 70 IN | SYSTOLIC BLOOD PRESSURE: 124 MMHG | HEART RATE: 79 BPM | TEMPERATURE: 96.6 F | OXYGEN SATURATION: 99 % | BODY MASS INDEX: 22.33 KG/M2 | WEIGHT: 156 LBS | DIASTOLIC BLOOD PRESSURE: 66 MMHG

## 2024-06-28 DIAGNOSIS — E11.9 TYPE 2 DIABETES MELLITUS WITHOUT COMPLICATION, WITHOUT LONG-TERM CURRENT USE OF INSULIN: ICD-10-CM

## 2024-06-28 DIAGNOSIS — R42 DIZZY: ICD-10-CM

## 2024-06-28 DIAGNOSIS — E11.65 TYPE 2 DIABETES MELLITUS WITH HYPERGLYCEMIA, WITHOUT LONG-TERM CURRENT USE OF INSULIN: ICD-10-CM

## 2024-06-28 DIAGNOSIS — E78.00 HIGH CHOLESTEROL: ICD-10-CM

## 2024-06-28 DIAGNOSIS — D51.9 ANEMIA DUE TO VITAMIN B12 DEFICIENCY, UNSPECIFIED B12 DEFICIENCY TYPE: Primary | ICD-10-CM

## 2024-06-28 LAB
ALBUMIN SERPL-MCNC: 4.6 G/DL (ref 3.5–5.2)
ALBUMIN UR-MCNC: 13.9 MG/DL
ALBUMIN/GLOB SERPL: 1.5 G/DL
ALP SERPL-CCNC: 86 U/L (ref 39–117)
ALT SERPL W P-5'-P-CCNC: 30 U/L (ref 1–41)
ANION GAP SERPL CALCULATED.3IONS-SCNC: 16.7 MMOL/L (ref 5–15)
AST SERPL-CCNC: 32 U/L (ref 1–40)
BACTERIA UR QL AUTO: ABNORMAL /HPF
BASOPHILS # BLD AUTO: 0.06 10*3/MM3 (ref 0–0.2)
BASOPHILS NFR BLD AUTO: 0.5 % (ref 0–1.5)
BILIRUB SERPL-MCNC: 0.7 MG/DL (ref 0–1.2)
BILIRUB UR QL STRIP: NEGATIVE
BUN SERPL-MCNC: 29 MG/DL (ref 8–23)
BUN/CREAT SERPL: 23.4 (ref 7–25)
CALCIUM SPEC-SCNC: 10.2 MG/DL (ref 8.6–10.5)
CHLORIDE SERPL-SCNC: 102 MMOL/L (ref 98–107)
CHOLEST SERPL-MCNC: 112 MG/DL (ref 0–200)
CLARITY UR: ABNORMAL
CO2 SERPL-SCNC: 20.3 MMOL/L (ref 22–29)
COLOR UR: ABNORMAL
CREAT SERPL-MCNC: 1.24 MG/DL (ref 0.76–1.27)
CREAT UR-MCNC: 215.8 MG/DL
DEPRECATED RDW RBC AUTO: 44.5 FL (ref 37–54)
EGFRCR SERPLBLD CKD-EPI 2021: 58.8 ML/MIN/1.73
EOSINOPHIL # BLD AUTO: 0.07 10*3/MM3 (ref 0–0.4)
EOSINOPHIL NFR BLD AUTO: 0.6 % (ref 0.3–6.2)
ERYTHROCYTE [DISTWIDTH] IN BLOOD BY AUTOMATED COUNT: 12.7 % (ref 12.3–15.4)
GLOBULIN UR ELPH-MCNC: 3 GM/DL
GLUCOSE SERPL-MCNC: 105 MG/DL (ref 65–99)
GLUCOSE UR STRIP-MCNC: NEGATIVE MG/DL
HBA1C MFR BLD: 6.3 % (ref 4.8–5.6)
HCT VFR BLD AUTO: 46.3 % (ref 37.5–51)
HDLC SERPL-MCNC: 40 MG/DL (ref 40–60)
HGB BLD-MCNC: 16 G/DL (ref 13–17.7)
HGB UR QL STRIP.AUTO: NEGATIVE
HOLD SPECIMEN: NORMAL
HYALINE CASTS UR QL AUTO: ABNORMAL /LPF
IMM GRANULOCYTES # BLD AUTO: 0.05 10*3/MM3 (ref 0–0.05)
IMM GRANULOCYTES NFR BLD AUTO: 0.4 % (ref 0–0.5)
KETONES UR QL STRIP: ABNORMAL
LDLC SERPL CALC-MCNC: 49 MG/DL (ref 0–100)
LDLC/HDLC SERPL: 1.15 {RATIO}
LEUKOCYTE ESTERASE UR QL STRIP.AUTO: ABNORMAL
LYMPHOCYTES # BLD AUTO: 1.7 10*3/MM3 (ref 0.7–3.1)
LYMPHOCYTES NFR BLD AUTO: 14.8 % (ref 19.6–45.3)
MCH RBC QN AUTO: 33.1 PG (ref 26.6–33)
MCHC RBC AUTO-ENTMCNC: 34.6 G/DL (ref 31.5–35.7)
MCV RBC AUTO: 95.9 FL (ref 79–97)
MICROALBUMIN/CREAT UR: 64.4 MG/G (ref 0–29)
MONOCYTES # BLD AUTO: 1.16 10*3/MM3 (ref 0.1–0.9)
MONOCYTES NFR BLD AUTO: 10.1 % (ref 5–12)
NEUTROPHILS NFR BLD AUTO: 73.6 % (ref 42.7–76)
NEUTROPHILS NFR BLD AUTO: 8.45 10*3/MM3 (ref 1.7–7)
NITRITE UR QL STRIP: NEGATIVE
NRBC BLD AUTO-RTO: 0 /100 WBC (ref 0–0.2)
PH UR STRIP.AUTO: 5.5 [PH] (ref 5–8)
PLATELET # BLD AUTO: 293 10*3/MM3 (ref 140–450)
PMV BLD AUTO: 11.2 FL (ref 6–12)
POTASSIUM SERPL-SCNC: 5.3 MMOL/L (ref 3.5–5.2)
PROT SERPL-MCNC: 7.6 G/DL (ref 6–8.5)
PROT UR QL STRIP: ABNORMAL
RBC # BLD AUTO: 4.83 10*6/MM3 (ref 4.14–5.8)
RBC # UR STRIP: ABNORMAL /HPF
REF LAB TEST METHOD: ABNORMAL
SODIUM SERPL-SCNC: 139 MMOL/L (ref 136–145)
SP GR UR STRIP: 1.03 (ref 1–1.03)
SQUAMOUS #/AREA URNS HPF: ABNORMAL /HPF
TRIGL SERPL-MCNC: 130 MG/DL (ref 0–150)
TSH SERPL DL<=0.05 MIU/L-ACNC: 1.31 UIU/ML (ref 0.27–4.2)
UROBILINOGEN UR QL STRIP: ABNORMAL
VLDLC SERPL-MCNC: 23 MG/DL (ref 5–40)
WBC # UR STRIP: ABNORMAL /HPF
WBC NRBC COR # BLD AUTO: 11.49 10*3/MM3 (ref 3.4–10.8)

## 2024-06-28 PROCEDURE — 84443 ASSAY THYROID STIM HORMONE: CPT | Performed by: NURSE PRACTITIONER

## 2024-06-28 PROCEDURE — 80061 LIPID PANEL: CPT | Performed by: NURSE PRACTITIONER

## 2024-06-28 PROCEDURE — 3074F SYST BP LT 130 MM HG: CPT | Performed by: NURSE PRACTITIONER

## 2024-06-28 PROCEDURE — 96372 THER/PROPH/DIAG INJ SC/IM: CPT | Performed by: STUDENT IN AN ORGANIZED HEALTH CARE EDUCATION/TRAINING PROGRAM

## 2024-06-28 PROCEDURE — 82570 ASSAY OF URINE CREATININE: CPT | Performed by: NURSE PRACTITIONER

## 2024-06-28 PROCEDURE — 36415 COLL VENOUS BLD VENIPUNCTURE: CPT | Performed by: NURSE PRACTITIONER

## 2024-06-28 PROCEDURE — 99214 OFFICE O/P EST MOD 30 MIN: CPT | Performed by: NURSE PRACTITIONER

## 2024-06-28 PROCEDURE — 87086 URINE CULTURE/COLONY COUNT: CPT | Performed by: NURSE PRACTITIONER

## 2024-06-28 PROCEDURE — 83036 HEMOGLOBIN GLYCOSYLATED A1C: CPT | Performed by: NURSE PRACTITIONER

## 2024-06-28 PROCEDURE — 3078F DIAST BP <80 MM HG: CPT | Performed by: NURSE PRACTITIONER

## 2024-06-28 PROCEDURE — 85025 COMPLETE CBC W/AUTO DIFF WBC: CPT | Performed by: NURSE PRACTITIONER

## 2024-06-28 PROCEDURE — 1126F AMNT PAIN NOTED NONE PRSNT: CPT | Performed by: NURSE PRACTITIONER

## 2024-06-28 PROCEDURE — 81001 URINALYSIS AUTO W/SCOPE: CPT | Performed by: NURSE PRACTITIONER

## 2024-06-28 PROCEDURE — 93000 ELECTROCARDIOGRAM COMPLETE: CPT | Performed by: NURSE PRACTITIONER

## 2024-06-28 PROCEDURE — 80053 COMPREHEN METABOLIC PANEL: CPT | Performed by: NURSE PRACTITIONER

## 2024-06-28 PROCEDURE — 82043 UR ALBUMIN QUANTITATIVE: CPT | Performed by: NURSE PRACTITIONER

## 2024-06-28 RX ADMIN — CYANOCOBALAMIN 1000 MCG: 1000 INJECTION, SOLUTION INTRAMUSCULAR; SUBCUTANEOUS at 10:44

## 2024-06-28 NOTE — PROGRESS NOTES
ACUTE VISIT     Patient Name: Dioni Macias  : 1945   MRN: [id number]     Chief Complaint:    Chief Complaint   Patient presents with    Dizziness       History of Present Illness: Dioni Macias is a 78 y.o. male who is here today for dizziness.      Pt reports when he got out of the car he started getting dizzy.  He has had these episodes before and usually eats a piece of candy.   He last ate this am around 8:15.  Which was a bowl of oatmeal  Denies chest pain and soa.  Denies vomiting diarrhea  Patient reports compliance with his metformin checks his blood sugar on occasion states it was 129 yesterday morning states he has been out active in his garden this morning    Accu-Chek 103 during office visit today    Subjective      Review of Systems:   Review of Systems   Constitutional:  Negative for fever.   HENT:  Negative for ear pain and sore throat.    Eyes:  Negative for visual disturbance.   Respiratory:  Negative for cough.    Cardiovascular:  Negative for chest pain.   Gastrointestinal:  Negative for abdominal pain, diarrhea and vomiting.   Endocrine: Negative for polydipsia and polyuria.   Genitourinary:  Negative for dysuria.   Musculoskeletal:  Negative for myalgias.   Neurological:  Positive for dizziness. Negative for headaches.        Past Medical History:   Past Medical History:   Diagnosis Date    Diabetes mellitus        Past Surgical History: No past surgical history on file.    Family History: No family history on file.    Social History:   Social History     Socioeconomic History    Marital status:    Tobacco Use    Smoking status: Former     Current packs/day: 0.00     Average packs/day: 1 pack/day for 30.0 years (30.0 ttl pk-yrs)     Types: Cigarettes     Start date:      Quit date:      Years since quittin.5    Smokeless tobacco: Former     Types: Chew, Snuff     Quit date:    Vaping Use    Vaping status: Never Used   Substance and Sexual Activity     Alcohol use: Yes     Alcohol/week: 2.0 standard drinks of alcohol     Types: 2 Cans of beer per week    Drug use: Never     Comment: gabapentin    Sexual activity: Defer       Medications:     Current Outpatient Medications:     Acetaminophen (Tylenol) 325 MG capsule, Take  by mouth., Disp: , Rfl:     aspirin 81 MG EC tablet, Take 1 tablet by mouth Daily., Disp: 90 tablet, Rfl: 4    atorvastatin (LIPITOR) 40 MG tablet, Take 1 tablet by mouth every night at bedtime., Disp: 90 tablet, Rfl: 4    donepezil (ARICEPT) 5 MG tablet, Take 1 tablet by mouth Daily., Disp: , Rfl:     DULoxetine (CYMBALTA) 60 MG capsule, Take 1 capsule by mouth Daily., Disp: 90 capsule, Rfl: 4    gabapentin (NEURONTIN) 300 MG capsule, Take 1 capsule by mouth Every Night., Disp: 90 capsule, Rfl: 1    glucose blood (Glucose Meter Test) test strip, check blood sugar every AM fasting and record. (Dx: E11.9), Disp: 100 each, Rfl: 3    glucose monitor monitoring kit, check blood sugar every AM fasting and record. (Dx: E11.9), Disp: 1 each, Rfl: 0    Lancets 28G misc, 1 each by Other route Daily., Disp: 100 each, Rfl: 2    meloxicam (Mobic) 7.5 MG tablet, Take 1 tablet by mouth Daily., Disp: 90 tablet, Rfl: 1    metFORMIN ER (GLUCOPHAGE-XR) 500 MG 24 hr tablet, Take 1 tablet by mouth Daily With Breakfast., Disp: 90 tablet, Rfl: 4    Omega-3 Fatty Acids (FISH OIL CONCENTRATE PO), Take  by mouth., Disp: , Rfl:     Current Facility-Administered Medications:     cyanocobalamin injection 1,000 mcg, 1,000 mcg, Subcutaneous, Q28 Days, Sarath Yang MD, 1,000 mcg at 06/28/24 1044    cyanocobalamin injection 1,000 mcg, 1,000 mcg, Intramuscular, Q28 Days, Sarath Yang MD    Allergies:   No Known Allergies      Objective     Physical Exam:  Vital Signs:   Vitals:    06/28/24 1233 06/28/24 1242 06/28/24 1251 06/28/24 1252   BP: 101/62 99/60 129/69 124/66   Patient Position:  Lying Sitting Standing   Pulse: 79      Temp: 96.6 °F (35.9 °C)     "  SpO2: 99%      Weight: 70.8 kg (156 lb)      Height: 177.8 cm (70\")        Body mass index is 22.38 kg/m².     Physical Exam  HENT:      Right Ear: Tympanic membrane normal.      Left Ear: Tympanic membrane normal.      Nose: Nose normal.      Mouth/Throat:      Mouth: Mucous membranes are moist.   Eyes:      Conjunctiva/sclera: Conjunctivae normal.   Neck:      Vascular: No carotid bruit.   Cardiovascular:      Rate and Rhythm: Normal rate and regular rhythm.      Heart sounds: Normal heart sounds. No murmur heard.  Pulmonary:      Effort: Pulmonary effort is normal.      Breath sounds: Normal breath sounds.   Abdominal:      General: Bowel sounds are normal.      Palpations: Abdomen is soft.   Musculoskeletal:      Right lower leg: No edema.      Left lower leg: No edema.   Skin:     General: Skin is warm and dry.   Neurological:      Mental Status: He is alert.   Psychiatric:         Mood and Affect: Mood normal.         Behavior: Behavior normal.           ECG 12 Lead    Date/Time: 6/28/2024 12:50 PM  Performed by: Yancy Gray APRN    Authorized by: Yancy Gray APRN  Comparison: not compared with previous ECG   Previous ECG: no previous ECG available  Rhythm: sinus rhythm  Comments: Sinus rhythm short TN interval           Assessment / Plan      Assessment/Plan:   Diagnoses and all orders for this visit:    1. Type 2 diabetes mellitus without complication, without long-term current use of insulin  -     CBC Auto Differential  -     Comprehensive Metabolic Panel  -     Hemoglobin A1c  -     Microalbumin / Creatinine Urine Ratio - Urine, Clean Catch  -     TSH  -     Urinalysis With Culture If Indicated -    2. High cholesterol  -     Comprehensive Metabolic Panel  -     Lipid Panel    3. Dizzy  -     ECG 12 Lead       Diabetes mellitus type 2 without complication currently on metformin will obtain hemoglobin A1c to monitor do recommend more frequent meals each containing " protein  Hyperlipidemia will obtain lipid panel and CMP to monitor current statin dose Lipitor 40 mg at nighttime denies myalgias  Dizziness EKG in office sinus rhythm orthostatics negative      Follow Up:   Return in about 6 weeks (around 8/6/2024).    JOSEE Luis      Please note that portions of this note were completed with a voice recognition program.

## 2024-06-29 LAB — BACTERIA SPEC AEROBE CULT: NO GROWTH

## 2024-07-03 DIAGNOSIS — E87.5 HIGH POTASSIUM: Primary | ICD-10-CM

## 2024-07-03 DIAGNOSIS — R82.998 HIGH URINE WHITE BLOOD CELL COUNT: ICD-10-CM

## 2024-07-05 ENCOUNTER — CLINICAL SUPPORT (OUTPATIENT)
Dept: FAMILY MEDICINE CLINIC | Facility: CLINIC | Age: 79
End: 2024-07-05
Payer: MEDICARE

## 2024-07-05 DIAGNOSIS — E87.5 HIGH POTASSIUM: ICD-10-CM

## 2024-07-05 DIAGNOSIS — E11.65 TYPE 2 DIABETES MELLITUS WITH HYPERGLYCEMIA, WITHOUT LONG-TERM CURRENT USE OF INSULIN: ICD-10-CM

## 2024-07-05 DIAGNOSIS — R82.998 HIGH URINE WHITE BLOOD CELL COUNT: ICD-10-CM

## 2024-07-05 LAB
ALBUMIN SERPL-MCNC: 4.2 G/DL (ref 3.5–5.2)
ALBUMIN/GLOB SERPL: 1.8 G/DL
ALP SERPL-CCNC: 76 U/L (ref 39–117)
ALT SERPL W P-5'-P-CCNC: 25 U/L (ref 1–41)
ANION GAP SERPL CALCULATED.3IONS-SCNC: 11.5 MMOL/L (ref 5–15)
AST SERPL-CCNC: 17 U/L (ref 1–40)
BACTERIA UR QL AUTO: ABNORMAL /HPF
BILIRUB SERPL-MCNC: 0.5 MG/DL (ref 0–1.2)
BILIRUB UR QL STRIP: NEGATIVE
BUN SERPL-MCNC: 25 MG/DL (ref 8–23)
BUN/CREAT SERPL: 22.7 (ref 7–25)
CALCIUM SPEC-SCNC: 9.8 MG/DL (ref 8.6–10.5)
CHLORIDE SERPL-SCNC: 100 MMOL/L (ref 98–107)
CLARITY UR: CLEAR
CO2 SERPL-SCNC: 26.5 MMOL/L (ref 22–29)
COLOR UR: ABNORMAL
CREAT SERPL-MCNC: 1.1 MG/DL (ref 0.76–1.27)
EGFRCR SERPLBLD CKD-EPI 2021: 67.9 ML/MIN/1.73
GLOBULIN UR ELPH-MCNC: 2.4 GM/DL
GLUCOSE SERPL-MCNC: 84 MG/DL (ref 65–99)
GLUCOSE UR STRIP-MCNC: NEGATIVE MG/DL
HGB UR QL STRIP.AUTO: NEGATIVE
HYALINE CASTS UR QL AUTO: ABNORMAL /LPF
KETONES UR QL STRIP: ABNORMAL
LEUKOCYTE ESTERASE UR QL STRIP.AUTO: ABNORMAL
NITRITE UR QL STRIP: NEGATIVE
PH UR STRIP.AUTO: 5.5 [PH] (ref 5–8)
POTASSIUM SERPL-SCNC: 4.9 MMOL/L (ref 3.5–5.2)
PROT SERPL-MCNC: 6.6 G/DL (ref 6–8.5)
PROT UR QL STRIP: NEGATIVE
RBC # UR STRIP: ABNORMAL /HPF
REF LAB TEST METHOD: ABNORMAL
SODIUM SERPL-SCNC: 138 MMOL/L (ref 136–145)
SP GR UR STRIP: 1.02 (ref 1–1.03)
SQUAMOUS #/AREA URNS HPF: ABNORMAL /HPF
UROBILINOGEN UR QL STRIP: ABNORMAL
WBC # UR STRIP: ABNORMAL /HPF

## 2024-07-05 PROCEDURE — 80053 COMPREHEN METABOLIC PANEL: CPT | Performed by: NURSE PRACTITIONER

## 2024-07-05 PROCEDURE — 81001 URINALYSIS AUTO W/SCOPE: CPT | Performed by: NURSE PRACTITIONER

## 2024-07-05 PROCEDURE — 36415 COLL VENOUS BLD VENIPUNCTURE: CPT | Performed by: NURSE PRACTITIONER

## 2024-07-09 ENCOUNTER — TREATMENT (OUTPATIENT)
Dept: PHYSICAL THERAPY | Facility: CLINIC | Age: 79
End: 2024-07-09
Payer: MEDICARE

## 2024-07-09 DIAGNOSIS — M25.512 ACUTE PAIN OF LEFT SHOULDER: Primary | ICD-10-CM

## 2024-07-09 NOTE — PROGRESS NOTES
"  Physical Therapy Initial Evaluation and Plan of Care                       Patient: Dioni Macias   : 1945  Diagnosis/ICD-10 Code:  Acute pain of left shoulder [M25.512]  Referring practitioner: Garland Wang MD  Date of Initial Visit: 2024  Today's Date: 2024  Patient seen for 1 sessions           Subjective Questionnaire: QuickDASH: 26      Subjective Evaluation    History of Present Illness  Mechanism of injury: Patient reports falling onto the left shoulder after stepping off the edge of the blacktop in 2024. He received and injection on  and states that he can now lift and move his arm \"a lot better\". He states lifting overhead or moving things at a certain angle will still cause pain at the shoulder. He states pushing the push mower especially turning with the left arm can cause pain. He denies sleep disturbance unless he rolls on his left side.    Pain  Quality: sharp and discomfort  Relieving factors: change in position  Aggravating factors: lifting, movement, overhead activity, outstretched reach and repetitive movement  Progression: improved    Social Support  Lives with: spouse    Hand dominance: left    Diagnostic Tests  MRI studies: abnormal (1.Mild acromioclavicular osteoarthritis 2.Full-thickness tear of the supraspinatus tendon, as detailed above. 3.Mild to moderate biceps tendinopathy. 4.Small intrasubstance tear of the distal subscapularis tendon. Mild subscapularis tendinopathy.)    Patient Goals  Patient goals for therapy: independence with ADLs/IADLs, decreased pain, increased strength, return to sport/leisure activities and increased motion             Objective          Active Range of Motion   Left Shoulder   Flexion: 132 degrees with pain  Abduction: 151 degrees with pain  External rotation 0°: 68 degrees   Internal rotation BTB: T8 WFL and with pain    Strength/Myotome Testing     Left Shoulder     Planes of Motion   Flexion: 4   Abduction: 4+ "   External rotation at 0°: 4   Internal rotation at 0°: 4+     Isolated Muscles   Biceps: 4     Tests     Left Shoulder   Positive empty can, Hawkin's, Neer's and Speed's.   Negative external rotation lag sign, full can and lift-off.       See Exercise, Manual, and Modality Logs for complete treatment.     Assessment & Plan       Assessment  Impairments: abnormal muscle firing, abnormal muscle tone, abnormal or restricted ROM, activity intolerance, impaired physical strength, lacks appropriate home exercise program, pain with function and safety issue   Functional limitations: carrying objects, lifting, pulling, pushing, reaching behind back, reaching overhead and unable to perform repetitive tasks   Assessment details: The patient presents to physical therapy with complaints of left shoulder pain. Signs and symptoms are consistent with full thickness supraspinatus tear and long head of biceps tendinopathy. He would benefit from skilled physical therapy as described below to address these limitations and allow the patient to return to his prior level of function.    Prognosis: good    Goals  Plan Goals: SHOULDER  PROBLEMS:     1. The patient has limited ROM of the left shoulder.    LTG 1: 8 weeks:  The patient will demonstrate 160 degrees of left shoulder flexion, 170 degrees of shoulder abduction to allow the patient to reach into upper kitchen cabinets and manipulate clothing behind the back with greater ease.    STATUS:  New   STG 1a: 4 weeks:  The patient will demonstrate 150 degrees of left shoulder flexion, 165 degrees of shoulder abduction.    STATUS:  New    2. The patient has limited strength of the left shoulder.   LTG 2: 8 weeks:  The patient will demonstrate 5 /5 strength for left shoulder flexion, abduction, external rotation, and internal rotation in order to demonstrate improved shoulder stability.    STATUS:  New   STG 2a: 4 weeks:  The patient will demonstrate 4+ /5 strength for left shoulder  flexion, abduction, external rotation, and internal rotation.    STATUS:  New   STG2b:  4 weeks:  The patient will be independent with home exercises.     STATUS:  New     3. The patient complains of pain to the left shoulder.   LTG 3: 8 weeks:  The patient will report a pain rating of 1 /10 or better in order to improve sleep quality and tolerance to performance of activities of daily living.    STATUS:  New   STG 3a: 4 weeks:  The patient will report a pain rating of 3 /10 or better.     STATUS:  New    4. Carrying, Moving, and Handling Objects Functional Limitation     LTG 4: 8 weeks:  The patient will demonstrate improved function by achieving a score of 10 on the QuickDASH.    STATUS:  New   STG 4a: 4 weeks:  The patient will demonstrate improved function by achieving a score of 15 on the QuickDASH.      STATUS:  New     Plan  Therapy options: will be seen for skilled therapy services  Planned modality interventions: cryotherapy, electrical stimulation/Russian stimulation and TENS  Planned therapy interventions: ADL retraining, soft tissue mobilization, strengthening, stretching, therapeutic activities, joint mobilization, home exercise program, functional ROM exercises, flexibility, body mechanics training, postural training, neuromuscular re-education, manual therapy and motor coordination training  Frequency: 3x week  Duration in weeks: 12  Treatment plan discussed with: patient        Visit Diagnoses:    ICD-10-CM ICD-9-CM   1. Acute pain of left shoulder  M25.512 719.41       History # of Personal Factors and/or Comorbidities: LOW (0)  Examination of Body System(s): # of elements: LOW (1-2)  Clinical Presentation: STABLE   Clinical Decision Making: LOW       Timed:         Manual Therapy:    9     mins  73351;     Therapeutic Exercise:    10     mins  47127;     Neuromuscular Preethi:    0    mins  18621;    Therapeutic Activity:     0     mins  84348;     Gait Trainin     mins  06440;      Ultrasound:     0     mins  64965;    Ionto                               0    mins   53617  Self Care                       0     mins   77888  Canalith Repos    0     mins 71231      Un-Timed:  Electrical Stimulation:    0     mins  95122 ( );  Dry Needling     0     mins self-pay  Traction     0     mins 18734  Low Eval     30     Mins  40787  Mod Eval     0     Mins  90513  High Eval                       0     Mins  87367  Re-Eval                           0    mins  24144    Timed Treatment:   19   mins   Total Treatment:     49   mins    PT SIGNATURE: Sebas Hart, PT    Electronically signed 7/9/2024    [place] License: PT - [id number]      Initial Certification  Certification Period: 7/9/2024 thru 10/6/2024  I certify that the therapy services are furnished while this patient is under my care.  The services outlined above are required by this patient, and will be reviewed every 90 days.     PHYSICIAN: Garland Wang MD  NPI: [id number]      DATE:     Please sign and return via fax to [phone]. Thank you, [facility].

## 2024-07-11 ENCOUNTER — TREATMENT (OUTPATIENT)
Dept: PHYSICAL THERAPY | Facility: CLINIC | Age: 79
End: 2024-07-11
Payer: MEDICARE

## 2024-07-11 DIAGNOSIS — M25.512 ACUTE PAIN OF LEFT SHOULDER: Primary | ICD-10-CM

## 2024-07-11 NOTE — PROGRESS NOTES
Physical Therapy Daily Treatment Note                          Patient: Dioni Macias   : 1945  Diagnosis/ICD-10 Code:  Acute pain of left shoulder [M25.512]  Referring practitioner: Garland Wang MD  Date of Initial Visit: Type: THERAPY  Noted: 2024  Today's Date: 2024  Patient seen for 2 sessions           Subjective   The patient reported having some increased discomfort at the top of the shoulder after picking buds off of one of his trees.     Objective   See Exercise, Manual, and Modality Logs for complete treatment.     Assessment/Plan     Reviewed HEP to ensure proper technique and mechanics with moderate cues required. Educated patient on encouraging use of the LUE within comfortable limits. Plan to continue POC.     Timed:  Manual Therapy:    9     mins  90127;  Therapeutic Exercise:    18     mins  89014;     Neuromuscular Preethi:   0    mins  23121;    Therapeutic Activity:     0     mins  41167;     Gait Trainin     mins  10573;     Aquatics                         0      mins  82246    Un-timed:  Mechanical Traction      0     mins  16741  Dry Needling     0     mins self-pay  Electrical Stimulation:    0     mins  28059 ( );      Timed Treatment:   27   mins   Total Treatment:     27   mins    Sebas Hart PT  Physical Therapist    Electronically signed 2024    [place] License: PT - [id number]

## 2024-07-16 ENCOUNTER — TREATMENT (OUTPATIENT)
Dept: PHYSICAL THERAPY | Facility: CLINIC | Age: 79
End: 2024-07-16
Payer: MEDICARE

## 2024-07-16 DIAGNOSIS — M25.512 ACUTE PAIN OF LEFT SHOULDER: Primary | ICD-10-CM

## 2024-07-16 NOTE — PROGRESS NOTES
Physical Therapy Daily Treatment Note      Patient: Dioni Macias   : 1945  Referring practitioner: Garland Wang MD  Date of Initial Visit: Type: THERAPY  Noted: 2024  Today's Date: 2024  Patient seen for 3 sessions           Subjective Questionnaire:       Subjective Evaluation    History of Present Illness    Subjective comment: Pt reports he took a shower last night and using towel to dry and it made his L shoulder sore.       Objective   See Exercise, Manual, and Modality Logs for complete treatment.       Assessment & Plan       Assessment  Assessment details: Pt reports PT is helping.  Pt was unable to perform horizontal ABD sighing with pain when he attempted it and with attempting reaching out.  Pt tolerated other strengthening. Pt reports pain with flexion/extension LUE passively.  Continue with POC.        Visit Diagnoses:    ICD-10-CM ICD-9-CM   1. Acute pain of left shoulder  M25.512 719.41       Progress per Plan of Care and Progress strengthening /stabilization /functional activity           Timed:  Manual Therapy:    5     mins  72239;  Therapeutic Exercise:    17     mins  03935;     Neuromuscular Preethi:        mins  70530;    Therapeutic Activity:     8     mins  96919;     Gait Training:           mins  38324;     Ultrasound:          mins  79428;    Electrical Stimulation:         mins  95855 ( );  Aquatic Therapy          mins  88213    Untimed:  Electrical Stimulation:         mins  67033 ( );  Mechanical Traction:         mins  04715;     Timed Treatment:   30   mins   Total Treatment:     30   mins    Electronically signed    Xiao Paul PTA  Physical Therapist Assistant    [place] license: [id number]

## 2024-07-18 ENCOUNTER — TREATMENT (OUTPATIENT)
Dept: PHYSICAL THERAPY | Facility: CLINIC | Age: 79
End: 2024-07-18
Payer: MEDICARE

## 2024-07-18 DIAGNOSIS — M25.512 ACUTE PAIN OF LEFT SHOULDER: Primary | ICD-10-CM

## 2024-07-18 NOTE — PROGRESS NOTES
"Physical Therapy Daily Treatment Note      Patient: Dioni Macias   : 1945  Referring practitioner: Garland Wang MD  Date of Initial Visit: Type: THERAPY  Noted: 2024  Today's Date: 2024  Patient seen for 4 sessions           Subjective Questionnaire:       Subjective Evaluation    History of Present Illness    Subjective comment: Pt reports his L shoulder might be a little bit better.  Pt reported he is going to put a sling on his L shoulder so he won't \"do anything stupid (reach up).\"       Objective   See Exercise, Manual, and Modality Logs for complete treatment.       Assessment & Plan       Assessment  Assessment details: Pt had difficult time with horizontal abduction, external rotation and flexion but he did perform once we decreased resistance and sets.  Pt will benefit from continued strengthening.         Visit Diagnoses:    ICD-10-CM ICD-9-CM   1. Acute pain of left shoulder  M25.512 719.41       Progress per Plan of Care and Progress strengthening /stabilization /functional activity           Timed:  Manual Therapy:         mins  88490;  Therapeutic Exercise:    15     mins  17502;     Neuromuscular Preethi:        mins  73398;    Therapeutic Activity:     12     mins  15316;     Gait Training:           mins  06132;     Ultrasound:          mins  26191;    Electrical Stimulation:         mins  88064 ( );  Aquatic Therapy          mins  84171    Untimed:  Electrical Stimulation:         mins  98240 ( );  Mechanical Traction:         mins  90920;     Timed Treatment:   27   mins   Total Treatment:     27   mins    Electronically signed    Xiao Paul PTA  Physical Therapist Assistant    [place] license: [id number]    "

## 2024-07-23 ENCOUNTER — TREATMENT (OUTPATIENT)
Dept: PHYSICAL THERAPY | Facility: CLINIC | Age: 79
End: 2024-07-23
Payer: MEDICARE

## 2024-07-23 DIAGNOSIS — M25.512 ACUTE PAIN OF LEFT SHOULDER: Primary | ICD-10-CM

## 2024-07-23 NOTE — PROGRESS NOTES
Physical Therapy Daily Treatment Note      Patient: Dioni Macias   : 1945  Referring practitioner: Garland Wang MD  Date of Initial Visit: Type: THERAPY  Noted: 2024  Today's Date: 2024  Patient seen for 5 sessions           Subjective Questionnaire:       Subjective Evaluation    History of Present Illness    Subjective comment: Pt reported he was helped out of a boat this weekend and they pulled out on his L shoulder and that was very painful.       Objective   See Exercise, Manual, and Modality Logs for complete treatment.       Assessment & Plan       Assessment  Assessment details: Pt reports like PT may be helping.  Pt tolerated strengthening well.  Pt tolerated passive stretching well.  Pt is progressing well and will benefit from continued PT.        Visit Diagnoses:    ICD-10-CM ICD-9-CM   1. Acute pain of left shoulder  M25.512 719.41       Progress per Plan of Care and Progress strengthening /stabilization /functional activity           Timed:  Manual Therapy:    10     mins  99782;  Therapeutic Exercise:    10     mins  30968;     Neuromuscular Preethi:        mins  06971;    Therapeutic Activity:     8     mins  04123;     Gait Training:           mins  61003;     Ultrasound:          mins  01462;    Electrical Stimulation:         mins  72235 ( );  Aquatic Therapy          mins  71718    Untimed:  Electrical Stimulation:         mins  12093 ( );  Mechanical Traction:         mins  65844;     Timed Treatment:   28   mins   Total Treatment:     28   mins    Electronically signed    Xiao Paul PTA  Physical Therapist Assistant    [place] license: [id number]

## 2024-07-25 ENCOUNTER — TREATMENT (OUTPATIENT)
Dept: PHYSICAL THERAPY | Facility: CLINIC | Age: 79
End: 2024-07-25
Payer: MEDICARE

## 2024-07-25 DIAGNOSIS — M25.512 ACUTE PAIN OF LEFT SHOULDER: Primary | ICD-10-CM

## 2024-07-25 NOTE — PROGRESS NOTES
Physical Therapy Daily Treatment Note                          Patient: Dioni Macias   : 1945  Diagnosis/ICD-10 Code:  Acute pain of left shoulder [M25.512]  Referring practitioner: Garland Wang MD  Date of Initial Visit: Type: THERAPY  Noted: 2024  Today's Date: 2024  Patient seen for 6 sessions           Subjective   The patient reported still avoiding lifting with his arm but no pain reported today.     Objective   See Exercise, Manual, and Modality Logs for complete treatment.     Assessment/Plan     Increased resistance and loading for L shoulder today with no increase in symptoms reported. Encouraged regular movement with L shoulder during ADLs with lightweight and slower motions. Plan to continue POC at this time.     Timed:  Manual Therapy:    0     mins  09613;  Therapeutic Exercise:    20     mins  81769;     Neuromuscular Preethi:   8    mins  88305;    Therapeutic Activity:     10     mins  82439;     Gait Trainin     mins  41993;     Aquatics                         0      mins  22552    Un-timed:  Mechanical Traction      0     mins  36034  Dry Needling     0     mins self-pay  Electrical Stimulation:    0     mins  09899 ( );      Timed Treatment:   38   mins   Total Treatment:     38   mins    Sebas Hart PT  Physical Therapist    Electronically signed 2024    [place] License: PT - [id number]

## 2024-07-26 ENCOUNTER — CLINICAL SUPPORT (OUTPATIENT)
Dept: FAMILY MEDICINE CLINIC | Facility: CLINIC | Age: 79
End: 2024-07-26
Payer: MEDICARE

## 2024-07-26 DIAGNOSIS — D51.9 ANEMIA DUE TO VITAMIN B12 DEFICIENCY, UNSPECIFIED B12 DEFICIENCY TYPE: Primary | ICD-10-CM

## 2024-07-26 PROCEDURE — 96372 THER/PROPH/DIAG INJ SC/IM: CPT | Performed by: NURSE PRACTITIONER

## 2024-07-26 RX ADMIN — CYANOCOBALAMIN 1000 MCG: 1000 INJECTION, SOLUTION INTRAMUSCULAR; SUBCUTANEOUS at 09:31

## 2024-07-30 ENCOUNTER — TREATMENT (OUTPATIENT)
Dept: PHYSICAL THERAPY | Facility: CLINIC | Age: 79
End: 2024-07-30
Payer: MEDICARE

## 2024-07-30 DIAGNOSIS — M25.512 ACUTE PAIN OF LEFT SHOULDER: Primary | ICD-10-CM

## 2024-07-30 PROCEDURE — 97530 THERAPEUTIC ACTIVITIES: CPT

## 2024-07-30 PROCEDURE — 97140 MANUAL THERAPY 1/> REGIONS: CPT

## 2024-07-30 NOTE — PROGRESS NOTES
Physical Therapy Daily Treatment Note  [facility] [address] [place] [address]    Patient: Dioni Macias   : 1945  Referring practitioner: Garland Wang MD  Date of Initial Visit: Type: THERAPY  Noted: 2024  Today's Date: 2024  Patient seen for 7 sessions           Subjective  Dioni Macias reports: he feels he is doing better with reduced pain and improved function.     Objective   See Exercise, Manual, and Modality Logs for complete treatment.     Assessment/Plan  Caleb is continuing to make progress with his AROM and gross strength yet will benefit from ongoing care.    Visit Diagnoses:    ICD-10-CM ICD-9-CM   1. Acute pain of left shoulder  M25.512 719.41       Progress per Plan of Care and Progress strengthening /stabilization /functional activity         Timed:  Manual Therapy:    8     mins  78683;  Therapeutic Exercise:         mins  46296;     Neuromuscular Preethi:        mins  49968;    Therapeutic Activity:     20     mins  98564;     Gait Training:           mins  51378;     Ultrasound:          mins  15798;    Electrical Stimulation:         mins  10651 ( );  Aquatics  __   mins   74818    Untimed:  Electrical Stimulation:         mins  34815 ( );  Mechanical Traction:         mins  55016;     Timed Treatment:   28   mins   Total Treatment:     28   mins    Electronically Signed:  Ivana Montez PTA  Physical Therapist Assistant    [place] PTA license [id number]

## 2024-08-01 ENCOUNTER — TREATMENT (OUTPATIENT)
Dept: PHYSICAL THERAPY | Facility: CLINIC | Age: 79
End: 2024-08-01
Payer: MEDICARE

## 2024-08-01 DIAGNOSIS — M25.512 ACUTE PAIN OF LEFT SHOULDER: Primary | ICD-10-CM

## 2024-08-01 NOTE — PROGRESS NOTES
Physical Therapy Daily Treatment Note      Patient: Dioni Macias   : 1945  Referring practitioner: Garland Wang MD  Date of Initial Visit: Type: THERAPY  Noted: 2024  Today's Date: 2024  Patient seen for 8 sessions           Subjective Questionnaire:       Subjective     Objective   See Exercise, Manual, and Modality Logs for complete treatment.       Assessment & Plan       Assessment  Assessment details: Pt tolerated strengthening well with no report of increased pain or discomfort.  Pt reported he was tossing corn with L arm yesterday and it made his LUE sore but it's not bad today.  Pt is progressing and prefers not to have surgery currently.  Continue with POC.        Visit Diagnoses:    ICD-10-CM ICD-9-CM   1. Acute pain of left shoulder  M25.512 719.41       Progress per Plan of Care and Progress strengthening /stabilization /functional activity           Timed:  Manual Therapy:   8      mins  42180;  Therapeutic Exercise:    18     mins  11781;     Neuromuscular Preethi:        mins  20911;    Therapeutic Activity:     8     mins  94389;     Gait Training:           mins  48818;     Ultrasound:          mins  74201;    Electrical Stimulation:         mins  41527 ( );  Aquatic Therapy          mins  40226    Untimed:  Electrical Stimulation:         mins  32183 ( );  Mechanical Traction:         mins  43142;     Timed Treatment:   34   mins   Total Treatment:     34   mins    Electronically signed    Xiao Paul PTA  Physical Therapist Assistant    [place] license: [id number]

## 2024-08-02 ENCOUNTER — OFFICE VISIT (OUTPATIENT)
Dept: ORTHOPEDIC SURGERY | Facility: CLINIC | Age: 79
End: 2024-08-02
Payer: MEDICARE

## 2024-08-02 VITALS
DIASTOLIC BLOOD PRESSURE: 62 MMHG | HEART RATE: 74 BPM | OXYGEN SATURATION: 97 % | SYSTOLIC BLOOD PRESSURE: 116 MMHG | BODY MASS INDEX: 22.48 KG/M2 | HEIGHT: 70 IN | WEIGHT: 157 LBS

## 2024-08-02 DIAGNOSIS — M75.112 NONTRAUMATIC INCOMPLETE TEAR OF LEFT ROTATOR CUFF: Primary | ICD-10-CM

## 2024-08-02 DIAGNOSIS — M25.512 LEFT SHOULDER PAIN, UNSPECIFIED CHRONICITY: ICD-10-CM

## 2024-08-02 PROCEDURE — 1160F RVW MEDS BY RX/DR IN RCRD: CPT

## 2024-08-02 PROCEDURE — 1159F MED LIST DOCD IN RCRD: CPT

## 2024-08-02 PROCEDURE — 99213 OFFICE O/P EST LOW 20 MIN: CPT

## 2024-08-02 PROCEDURE — 3074F SYST BP LT 130 MM HG: CPT

## 2024-08-02 PROCEDURE — 3078F DIAST BP <80 MM HG: CPT

## 2024-08-02 NOTE — PROGRESS NOTES
"Chief Complaint  Follow-up of the Left Shoulder    Subjective      Dioni Macias presents to [facility] for follow up of his left shoulder.  Patient was last seen and evaluated in office on 6/19/2024 and received a left shoulder steroid injection for a traumatic rotator cuff tear and pain.  He was also given an order for physical therapy.  Today patient states, he is doing better than he was before.  He states that physical therapy has helped him but he still having significant pain around the front of his shoulder.    No Known Allergies    Objective     Vital Signs:   Vitals:    08/02/24 1423   BP: 116/62   Pulse: 74   SpO2: 97%   Weight: 71.2 kg (157 lb)   Height: 177.8 cm (70\")     Body mass index is 22.53 kg/m².    I reviewed the patient's chief complaint, history of present illness, review of systems, past medical history, surgical history, family history, social history, medications, and allergy list.     REVIEW OF SYSTEMS    Constitutional: Denies fevers, chills, weight loss  Cardiovascular: Denies chest pain, shortness of breath  Skin: Denies rashes, acute skin changes  Neurologic: Denies headache, loss of consciousness  MSK: Left shoulder pain.     Ortho Exam  Shoulder   General: Alert. No acute distress.  Left upper Extremity: 180 degrees active elevation. External rotation to 65 degrees. Internal rotation to back pocket.  Tender to palpation over biceps tendon.  Demonstrates intact active elbow ROM. Demonstrates intact active wrist ROM. Sensation intact. Palpable radial pulse. Neurovascularly intact.            Imaging Results (Most Recent)       None                Assessment and Plan   Diagnoses and all orders for this visit:    1. Nontraumatic incomplete tear of left rotator cuff (Primary)    2. Left shoulder pain, unspecified chronicity         Dioni Macias presents to [facility] for his left shoulder.  Patient has had significant " improvement with physical therapy and previous steroid injection.  Discussed patient is eligible for repeat steroid injection in 6 weeks.  Discussed topical anti-inflammatory gel and activity modification as well as icing.    Patient will follow-up in 6 weeks for possible repeat left shoulder steroid injection if needed.      Tobacco Use: Medium Risk (6/28/2024)    Patient History     Smoking Tobacco Use: Former     Smokeless Tobacco Use: Former     Passive Exposure: Not on file     Patient reports they have a history of tobacco use; encouraged continued tobacco cessation for further health benefits.     BMI is within normal parameters. No other follow-up for BMI required.      Follow Up   Return in about 6 weeks (around 9/13/2024).  There are no Patient Instructions on file for this visit.  Patient was given instructions and counseling regarding his condition or for health maintenance advice. Please see specific information pulled into the AVS if appropriate.       Dictated Utilizing Dragon Dictation. Please note that portions of this note were completed with a voice recognition program. Part of this note may be an electronic transcription/translation of spoken language to printed text using the Dragon Dictation System.

## 2024-08-05 ENCOUNTER — TREATMENT (OUTPATIENT)
Dept: PHYSICAL THERAPY | Facility: CLINIC | Age: 79
End: 2024-08-05
Payer: MEDICARE

## 2024-08-05 DIAGNOSIS — M25.512 ACUTE PAIN OF LEFT SHOULDER: Primary | ICD-10-CM

## 2024-08-05 PROCEDURE — 97110 THERAPEUTIC EXERCISES: CPT | Performed by: PHYSICAL THERAPIST

## 2024-08-05 PROCEDURE — 97530 THERAPEUTIC ACTIVITIES: CPT | Performed by: PHYSICAL THERAPIST

## 2024-08-05 NOTE — PROGRESS NOTES
"Physical Therapy Daily Treatment Note  [place] [facility] [address] [place] [address]    Patient: Dioni Macias   : 1945  Referring practitioner: Garland Wang MD  Date of Initial Visit: Type: THERAPY  Noted: 2024  Today's Date: 2024  Patient seen for 9 sessions           Subjective  Dioni Macias reports:  he is supposed to get a cream for his shoulder but pharmacy didn't fill it, \"something is going on, maybe insurance won't fill it. They told me to get the off brand.\"\"    Objective   See Exercise, Manual, and Modality Logs for complete treatment.     Assessment/Plan  Bill reported he is seeing benefit from PT. He is showing strength gains evident with tracking on flow sheet. Continued need for therapist directed intervention.     Visit Diagnoses:    ICD-10-CM ICD-9-CM   1. Acute pain of left shoulder  M25.512 719.41       Progress per Plan of Care and Progress strengthening /stabilization /functional activity           Timed:  Manual Therapy:         mins  96985;  Therapeutic Exercise:    12     mins  72224;     Neuromuscular Preethi:        mins  20046;    Therapeutic Activity:     13     mins  57352;     Gait Training:           mins  71791;     Ultrasound:          mins  03494;    Electrical Stimulation:         mins  23314 ( );  Aquatics  __   mins   39877    Untimed:  Electrical Stimulation:         mins  87041 ( );  Mechanical Traction:         mins  54965;     Timed Treatment:   25   mins   Total Treatment:     25   mins    Electronically Signed:  Ivana Montez PTA  Physical Therapist Assistant    [place] PTA license [id number]            "

## 2024-08-06 ENCOUNTER — TREATMENT (OUTPATIENT)
Dept: PHYSICAL THERAPY | Facility: CLINIC | Age: 79
End: 2024-08-06
Payer: MEDICARE

## 2024-08-06 ENCOUNTER — OFFICE VISIT (OUTPATIENT)
Dept: FAMILY MEDICINE CLINIC | Facility: CLINIC | Age: 79
End: 2024-08-06
Payer: MEDICARE

## 2024-08-06 VITALS
TEMPERATURE: 97 F | BODY MASS INDEX: 22.48 KG/M2 | DIASTOLIC BLOOD PRESSURE: 61 MMHG | HEIGHT: 70 IN | SYSTOLIC BLOOD PRESSURE: 147 MMHG | OXYGEN SATURATION: 98 % | WEIGHT: 157 LBS | HEART RATE: 70 BPM

## 2024-08-06 DIAGNOSIS — R07.89 CHEST PRESSURE: ICD-10-CM

## 2024-08-06 DIAGNOSIS — G89.29 CHRONIC MIDLINE LOW BACK PAIN WITHOUT SCIATICA: ICD-10-CM

## 2024-08-06 DIAGNOSIS — M54.50 CHRONIC MIDLINE LOW BACK PAIN WITHOUT SCIATICA: ICD-10-CM

## 2024-08-06 DIAGNOSIS — E78.2 MIXED HYPERLIPIDEMIA: ICD-10-CM

## 2024-08-06 DIAGNOSIS — F33.0 DEPRESSION, MAJOR, RECURRENT, MILD: ICD-10-CM

## 2024-08-06 DIAGNOSIS — E11.9 TYPE 2 DIABETES MELLITUS WITHOUT COMPLICATION, WITHOUT LONG-TERM CURRENT USE OF INSULIN: Primary | ICD-10-CM

## 2024-08-06 DIAGNOSIS — E11.65 TYPE 2 DIABETES MELLITUS WITH HYPERGLYCEMIA, WITHOUT LONG-TERM CURRENT USE OF INSULIN: ICD-10-CM

## 2024-08-06 DIAGNOSIS — I10 PRIMARY HYPERTENSION: ICD-10-CM

## 2024-08-06 DIAGNOSIS — M25.512 ACUTE PAIN OF LEFT SHOULDER: Primary | ICD-10-CM

## 2024-08-06 DIAGNOSIS — D51.9 ANEMIA DUE TO VITAMIN B12 DEFICIENCY, UNSPECIFIED B12 DEFICIENCY TYPE: ICD-10-CM

## 2024-08-06 LAB
ALBUMIN SERPL-MCNC: 4.4 G/DL (ref 3.5–5.2)
ALBUMIN/GLOB SERPL: 1.8 G/DL
ALP SERPL-CCNC: 87 U/L (ref 39–117)
ALT SERPL W P-5'-P-CCNC: 20 U/L (ref 1–41)
ANION GAP SERPL CALCULATED.3IONS-SCNC: 9 MMOL/L (ref 5–15)
AST SERPL-CCNC: 19 U/L (ref 1–40)
BASOPHILS # BLD AUTO: 0.09 10*3/MM3 (ref 0–0.2)
BASOPHILS NFR BLD AUTO: 0.9 % (ref 0–1.5)
BILIRUB SERPL-MCNC: 0.6 MG/DL (ref 0–1.2)
BUN SERPL-MCNC: 22 MG/DL (ref 8–23)
BUN/CREAT SERPL: 17.1 (ref 7–25)
CALCIUM SPEC-SCNC: 10 MG/DL (ref 8.6–10.5)
CHLORIDE SERPL-SCNC: 100 MMOL/L (ref 98–107)
CHOLEST SERPL-MCNC: 110 MG/DL (ref 0–200)
CK MB SERPL-CCNC: 6.16 NG/ML
CO2 SERPL-SCNC: 27 MMOL/L (ref 22–29)
CREAT SERPL-MCNC: 1.29 MG/DL (ref 0.76–1.27)
DEPRECATED RDW RBC AUTO: 42.3 FL (ref 37–54)
EGFRCR SERPLBLD CKD-EPI 2021: 55.7 ML/MIN/1.73
EOSINOPHIL # BLD AUTO: 0.57 10*3/MM3 (ref 0–0.4)
EOSINOPHIL NFR BLD AUTO: 5.8 % (ref 0.3–6.2)
ERYTHROCYTE [DISTWIDTH] IN BLOOD BY AUTOMATED COUNT: 12.5 % (ref 12.3–15.4)
GLOBULIN UR ELPH-MCNC: 2.5 GM/DL
GLUCOSE SERPL-MCNC: 119 MG/DL (ref 65–99)
HBA1C MFR BLD: 6.4 % (ref 4.8–5.6)
HCT VFR BLD AUTO: 40.8 % (ref 37.5–51)
HDLC SERPL-MCNC: 37 MG/DL (ref 40–60)
HGB BLD-MCNC: 14.4 G/DL (ref 13–17.7)
IMM GRANULOCYTES # BLD AUTO: 0.05 10*3/MM3 (ref 0–0.05)
IMM GRANULOCYTES NFR BLD AUTO: 0.5 % (ref 0–0.5)
LDLC SERPL CALC-MCNC: 45 MG/DL (ref 0–100)
LDLC/HDLC SERPL: 1.07 {RATIO}
LYMPHOCYTES # BLD AUTO: 1.53 10*3/MM3 (ref 0.7–3.1)
LYMPHOCYTES NFR BLD AUTO: 15.5 % (ref 19.6–45.3)
MCH RBC QN AUTO: 33.4 PG (ref 26.6–33)
MCHC RBC AUTO-ENTMCNC: 35.3 G/DL (ref 31.5–35.7)
MCV RBC AUTO: 94.7 FL (ref 79–97)
MONOCYTES # BLD AUTO: 0.91 10*3/MM3 (ref 0.1–0.9)
MONOCYTES NFR BLD AUTO: 9.2 % (ref 5–12)
NEUTROPHILS NFR BLD AUTO: 6.71 10*3/MM3 (ref 1.7–7)
NEUTROPHILS NFR BLD AUTO: 68.1 % (ref 42.7–76)
NRBC BLD AUTO-RTO: 0 /100 WBC (ref 0–0.2)
PLATELET # BLD AUTO: 282 10*3/MM3 (ref 140–450)
PMV BLD AUTO: 11 FL (ref 6–12)
POTASSIUM SERPL-SCNC: 4.7 MMOL/L (ref 3.5–5.2)
PROT SERPL-MCNC: 6.9 G/DL (ref 6–8.5)
RBC # BLD AUTO: 4.31 10*6/MM3 (ref 4.14–5.8)
SODIUM SERPL-SCNC: 136 MMOL/L (ref 136–145)
TRIGL SERPL-MCNC: 167 MG/DL (ref 0–150)
TROPONIN T SERPL HS-MCNC: 24 NG/L
TSH SERPL DL<=0.05 MIU/L-ACNC: 1.27 UIU/ML (ref 0.27–4.2)
VIT B12 BLD-MCNC: 683 PG/ML (ref 211–946)
VLDLC SERPL-MCNC: 28 MG/DL (ref 5–40)
WBC NRBC COR # BLD AUTO: 9.86 10*3/MM3 (ref 3.4–10.8)

## 2024-08-06 PROCEDURE — 84443 ASSAY THYROID STIM HORMONE: CPT | Performed by: NURSE PRACTITIONER

## 2024-08-06 PROCEDURE — 83036 HEMOGLOBIN GLYCOSYLATED A1C: CPT | Performed by: NURSE PRACTITIONER

## 2024-08-06 PROCEDURE — 80053 COMPREHEN METABOLIC PANEL: CPT | Performed by: NURSE PRACTITIONER

## 2024-08-06 PROCEDURE — 85025 COMPLETE CBC W/AUTO DIFF WBC: CPT | Performed by: NURSE PRACTITIONER

## 2024-08-06 PROCEDURE — 82553 CREATINE MB FRACTION: CPT | Performed by: NURSE PRACTITIONER

## 2024-08-06 PROCEDURE — 36415 COLL VENOUS BLD VENIPUNCTURE: CPT | Performed by: NURSE PRACTITIONER

## 2024-08-06 PROCEDURE — 1126F AMNT PAIN NOTED NONE PRSNT: CPT | Performed by: NURSE PRACTITIONER

## 2024-08-06 PROCEDURE — 82607 VITAMIN B-12: CPT | Performed by: NURSE PRACTITIONER

## 2024-08-06 PROCEDURE — 80061 LIPID PANEL: CPT | Performed by: NURSE PRACTITIONER

## 2024-08-06 PROCEDURE — 3078F DIAST BP <80 MM HG: CPT | Performed by: NURSE PRACTITIONER

## 2024-08-06 PROCEDURE — 99214 OFFICE O/P EST MOD 30 MIN: CPT | Performed by: NURSE PRACTITIONER

## 2024-08-06 PROCEDURE — 3077F SYST BP >= 140 MM HG: CPT | Performed by: NURSE PRACTITIONER

## 2024-08-06 PROCEDURE — 93000 ELECTROCARDIOGRAM COMPLETE: CPT | Performed by: NURSE PRACTITIONER

## 2024-08-06 PROCEDURE — 84484 ASSAY OF TROPONIN QUANT: CPT | Performed by: NURSE PRACTITIONER

## 2024-08-06 NOTE — PROGRESS NOTES
Physical Therapy Daily Treatment Note  [place] [facility] [address] [place] [address]    Patient: Dioni Macias   : 1945  Referring practitioner: Garland Wang MD  Date of Initial Visit: Type: THERAPY  Noted: 2024  Today's Date: 2024  Patient seen for 10 sessions           Subjective  Dioni Macias reports: he just had blood work and an EKG performed this morning. Caleb commented he has minimal discomfort L shoulder, indicating anterior joint.      Objective See Exercise, Manual, and Modality Logs for complete treatment.       Assessment/Plan  Caleb is to undergo a PN visit next session with determinations to be made as to his advancements in gross strength, pain control and further need for therapist directed intervention.     Visit Diagnoses:    ICD-10-CM ICD-9-CM   1. Acute pain of left shoulder  M25.512 719.41       Progress per Plan of Care and Progress strengthening /stabilization /functional activity         Timed:  Manual Therapy:         mins  67088;  Therapeutic Exercise:         mins  03766;     Neuromuscular Preethi:    12    mins  48419;    Therapeutic Activity:    15      mins  54315;     Gait Training:           mins  62345;     Ultrasound:          mins  06476;    Electrical Stimulation:         mins  14588 ( );  Aquatics  __   mins   22679    Untimed:  Electrical Stimulation:         mins  36168 ( );  Mechanical Traction:         mins  75475;     Timed Treatment:   27   mins   Total Treatment:     27   mins    Electronically Signed:  Ivana Montez PTA  Physical Therapist Assistant    [place] PTA license [id number]

## 2024-08-06 NOTE — PROGRESS NOTES
Follow Up Office Visit      Patient Name: Dioni Macias  : 1945   MRN: [id number]     Chief Complaint:    Chief Complaint   Patient presents with    Heartburn    Depression    Hypertension    Hyperlipidemia    Diabetes    Anemia       History of Present Illness: Dioni Macias is a 79 y.o. male who is here today to follow up for DM2, anemia, HTN, hyperlipidemia, GERD, depression.  Review lab results      A1C- 2024  Eye exam- 2024 garcia  Foot exam- checks at home    C/o for the last month he has noticed  left side chest pressure without SOA, shakiness and sweating a lot.  Feels the chest pain occurs almost everyday   Intermittent dizziness with quick position change   Cardiologist previously seen dr troy 2017 states he is active and walks most days of the week states he previously had a stress test but has been several years ago with Dr. Troy    Subjective      Review of Systems:   Review of Systems   Constitutional:  Positive for diaphoresis. Negative for fever.   HENT:  Negative for ear pain and sore throat.    Eyes:  Negative for visual disturbance.   Respiratory:  Negative for cough and shortness of breath.    Cardiovascular:  Positive for chest pain. Negative for palpitations and leg swelling.   Gastrointestinal:  Negative for abdominal pain, constipation, diarrhea, nausea and vomiting.   Genitourinary:  Negative for dysuria.   Neurological:  Positive for dizziness. Negative for headaches.        Past Medical History:   Past Medical History:   Diagnosis Date    Diabetes mellitus        Past Surgical History: No past surgical history on file.    Family History: No family history on file.    Social History:   Social History     Socioeconomic History    Marital status:    Tobacco Use    Smoking status: Former     Current packs/day: 0.00     Average packs/day: 1 pack/day for 30.0 years (30.0 ttl pk-yrs)     Types: Cigarettes     Start date:      Quit date: 2000     Years since  quittin.6    Smokeless tobacco: Former     Types: Chew, Snuff     Quit date:    Vaping Use    Vaping status: Never Used   Substance and Sexual Activity    Alcohol use: Yes     Alcohol/week: 2.0 standard drinks of alcohol     Types: 2 Cans of beer per week    Drug use: Never     Comment: gabapentin    Sexual activity: Defer       Medications:     Current Outpatient Medications:     Acetaminophen (Tylenol) 325 MG capsule, Take  by mouth., Disp: , Rfl:     aspirin 81 MG EC tablet, Take 1 tablet by mouth Daily., Disp: 90 tablet, Rfl: 4    atorvastatin (LIPITOR) 40 MG tablet, Take 1 tablet by mouth every night at bedtime., Disp: 90 tablet, Rfl: 4    Diclofenac Sodium (VOLTAREN) 1 % gel gel, Apply 4 g topically to the appropriate area as directed 4 (Four) Times a Day., Disp: 4 g, Rfl: 1    donepezil (ARICEPT) 5 MG tablet, Take 1 tablet by mouth Daily., Disp: , Rfl:     DULoxetine (CYMBALTA) 60 MG capsule, Take 1 capsule by mouth Daily., Disp: 90 capsule, Rfl: 4    gabapentin (NEURONTIN) 300 MG capsule, Take 1 capsule by mouth Every Night., Disp: 90 capsule, Rfl: 1    glucose blood (Glucose Meter Test) test strip, check blood sugar every AM fasting and record. (Dx: E11.9), Disp: 100 each, Rfl: 3    glucose monitor monitoring kit, check blood sugar every AM fasting and record. (Dx: E11.9), Disp: 1 each, Rfl: 0    Lancets 28G misc, 1 each by Other route Daily., Disp: 100 each, Rfl: 2    meloxicam (Mobic) 7.5 MG tablet, Take 1 tablet by mouth Daily., Disp: 90 tablet, Rfl: 1    metFORMIN ER (GLUCOPHAGE-XR) 500 MG 24 hr tablet, Take 1 tablet by mouth Daily With Breakfast., Disp: 90 tablet, Rfl: 4    Omega-3 Fatty Acids (FISH OIL CONCENTRATE PO), Take  by mouth., Disp: , Rfl:     Current Facility-Administered Medications:     cyanocobalamin injection 1,000 mcg, 1,000 mcg, Subcutaneous, Q28 Days, Sarath Yang MD, 1,000 mcg at 24 1044    cyanocobalamin injection 1,000 mcg, 1,000 mcg, Intramuscular, Q28  "Celia Vazquez Jonatan David, MD, 1,000 mcg at 07/26/24 0931    Allergies:   No Known Allergies          Objective     Physical Exam:  Vital Signs:   Vitals:    08/06/24 0843   BP: 147/61   Pulse: 70   Temp: 97 °F (36.1 °C)   SpO2: 98%   Weight: 71.2 kg (157 lb)   Height: 177.8 cm (70\")     Body mass index is 22.53 kg/m².   BMI is within normal parameters. No other follow-up for BMI required.       Physical Exam  HENT:      Right Ear: Tympanic membrane normal.      Left Ear: Tympanic membrane normal.      Nose: Nose normal.      Mouth/Throat:      Mouth: Mucous membranes are moist.   Eyes:      Conjunctiva/sclera: Conjunctivae normal.   Neck:      Vascular: No carotid bruit.   Cardiovascular:      Rate and Rhythm: Normal rate and regular rhythm.      Heart sounds: Normal heart sounds. No murmur heard.  Pulmonary:      Effort: Pulmonary effort is normal.      Breath sounds: Normal breath sounds.   Abdominal:      General: Bowel sounds are normal.      Palpations: Abdomen is soft.   Musculoskeletal:      Right lower leg: No edema.      Left lower leg: No edema.   Skin:     General: Skin is warm and dry.   Neurological:      Mental Status: He is alert.   Psychiatric:         Mood and Affect: Mood normal.         Behavior: Behavior normal.         ECG 12 Lead    Date/Time: 8/6/2024 9:22 AM  Performed by: Yancy Gray APRN    Authorized by: Yancy Gray APRN  Comparison: compared with previous ECG from 6/28/2024  Similar to previous ECG  Comparison to previous ECG:     Previous ECG: no previous ECG available  Rhythm: sinus rhythm  Comments: Sinus rhythm short AL interval    Rhythm: sinus rhythm  Comments: Sinus rhythm short AL interval         Assessment / Plan      Assessment/Plan:   Diagnoses and all orders for this visit:    1. Type 2 diabetes mellitus without complication, without long-term current use of insulin (Primary)  -     Comprehensive Metabolic Panel; Future  -     Microalbumin / " "Creatinine Urine Ratio - Urine, Clean Catch; Future  -     Hemoglobin A1c; Future  -     TSH; Future  -     Urinalysis With Culture If Indicated -; Future    2. Primary hypertension    3. Mixed hyperlipidemia  -     Comprehensive Metabolic Panel; Future  -     Lipid Panel; Future    4. Depression, major, recurrent, mild    5. Chronic midline low back pain without sciatica    6. Anemia due to vitamin B12 deficiency, unspecified B12 deficiency type  -     CBC Auto Differential; Future  -     Vitamin B12; Future    7. Chest pressure  -     CK-MB  -     High Sensitivity Troponin T  -     Cancel: Ambulatory Referral to Cardiology  -     Ambulatory Referral to Cardiology  -     Treadmill Stress Test; Future         Diabetes mellitus type 2 currently controlled hemoglobin A1c below 6.5 on metformin once daily  Hyperlipidemia LDL below goal on current statin dose Lipitor 40 mg at nighttime denies myalgias  Hypertension control without medication at this time  Depression stable at this time with Cymbalta  Chronic low back pain with bilateral sciatica currently controlled with Cymbalta meloxicam and gabapentin no refills needed at this time  Anemia hemoglobin hematocrit normal we will reassess at 5-month follow-up assesses B12 level  Chest pressure will obtain labs refer to cardiology as Dr. Griffith has retired from practice will also obtain stress test and EKG in office similar to previous EKG no acute findings      Follow Up:   Return in about 5 months (around 1/6/2025).    Julieth Gray, JOSEE    \"Please note that portions of this note were completed with a voice recognition program.\"    "

## 2024-08-08 ENCOUNTER — TELEPHONE (OUTPATIENT)
Dept: FAMILY MEDICINE CLINIC | Facility: CLINIC | Age: 79
End: 2024-08-08
Payer: MEDICARE

## 2024-08-08 DIAGNOSIS — R07.89 CHEST PRESSURE: ICD-10-CM

## 2024-08-08 DIAGNOSIS — R79.89 ELEVATED TROPONIN: Primary | ICD-10-CM

## 2024-08-08 NOTE — TELEPHONE ENCOUNTER
Hub staff attempted to follow warm transfer process and was unsuccessful     Caller: Dioni Macias    Relationship to patient: Self    Best call back number: [phone]    Patient is needing: PATIENT RETURNING MISSED CALL FROM [facility] REGARDING RESULTS.

## 2024-08-09 ENCOUNTER — TREATMENT (OUTPATIENT)
Dept: PHYSICAL THERAPY | Facility: CLINIC | Age: 79
End: 2024-08-09
Payer: MEDICARE

## 2024-08-09 DIAGNOSIS — M25.512 ACUTE PAIN OF LEFT SHOULDER: Primary | ICD-10-CM

## 2024-08-09 NOTE — PROGRESS NOTES
Progress Assessment  [address] [place] [id number]        Patient: Dioni Macias   : 1945  Diagnosis/ICD-10 Code:  Acute pain of left shoulder [M25.512]  Referring practitioner: Garland Wang MD  Date of Initial Visit: Type: THERAPY  Noted: 2024  Today's Date: 2024  Patient seen for 11 sessions      Subjective:   Dioni Macias reports: no pain, but more soreness today  Subjective Questionnaire: QuickDASH:   Clinical Progress: improved  Home Program Compliance: Yes  Treatment has included: therapeutic exercise, neuromuscular re-education, manual therapy, and therapeutic activity    Subjective     Pt reports that he feels like his L shoulder has significantly improved since starting PT. Pt reports that he had returned back to all functional activities with only reports of slight discomfort at end range.     Objective     Active Range of Motion   Left Shoulder   Flexion: 160 degrees  Abduction: 170 degrees  External rotation 0°: 80 degrees   Internal rotation BTB: T8     Strength/Myotome Testing      Left Shoulder      Planes of Motion   Flexion:5   Abduction: 5  External rotation at 0°: 5  Internal rotation at 0°: 5     Isolated Muscles   Biceps: 5    Assessment/Plan     Pt has met both strength and ROM goals for L shoulder. Pt has full strength in all planes and functional L shoulder AROM. Due to patient meeting these goals, patient is discharged from PT at this time. Discussed with patient to continue with exercises at home in order to maintain strength and ROM. Pt agrees with plan. Discussed with patient to call if he has any questions.     Goals  Plan Goals: SHOULDER  PROBLEMS:      1. The patient has limited ROM of the left shoulder.                LTG 1: 8 weeks:  The patient will demonstrate 160 degrees of left shoulder flexion, 170 degrees of shoulder abduction to allow the patient to reach into upper kitchen cabinets and manipulate clothing behind the back with  greater ease.                          STATUS:  MET              STG 1a: 4 weeks:  The patient will demonstrate 150 degrees of left shoulder flexion, 165 degrees of shoulder abduction.                          STATUS:  MET     2. The patient has limited strength of the left shoulder.              LTG 2: 8 weeks:  The patient will demonstrate 5 /5 strength for left shoulder flexion, abduction, external rotation, and internal rotation in order to demonstrate improved shoulder stability.                          STATUS:  MET              STG 2a: 4 weeks:  The patient will demonstrate 4+ /5 strength for left shoulder flexion, abduction, external rotation, and internal rotation.                          STATUS:  MET              STG2b:  4 weeks:  The patient will be independent with home exercises.                           STATUS:  MET                3. The patient complains of pain to the left shoulder.              LTG 3: 8 weeks:  The patient will report a pain rating of 1 /10 or better in order to improve sleep quality and tolerance to performance of activities of daily living.                          STATUS: MET              STG 3a: 4 weeks:  The patient will report a pain rating of 3 /10 or better.                           STATUS:  MET     4. Carrying, Moving, and Handling Objects Functional Limitation                               LTG 4: 8 weeks:  The patient will demonstrate improved function by achieving a score of 10 on the QuickDASH.                          STATUS:  NOT MET              STG 4a: 4 weeks:  The patient will demonstrate improved function by achieving a score of 15 on the QuickDASH.                            STATUS:  NOT MET    Progress toward previous goals: Partially Met    See Exercise, Manual, and Modality Logs for complete treatment.         Recommendations: Discharge      PT SIGNATURE: Electronically signed by Socrates Dickerson PT  [place] LICENSE: [id number]    Based upon review of the  patient's progress and continued therapy plan, it is my medical opinion that Dioni Macias should continue physical therapy treatment at [facility]  [address] [place] [address] [phone].      Timed:                 Manual Therapy:    0     mins  83908;     Therapeutic Exercise:    15     mins  65417;     Neuromuscular Preethi:    0    mins  23516;    Therapeutic Activity:     9     mins  73723;     Gait Trainin     mins  07861;     Ultrasound:     0     mins  30617;    Ionto                               0    mins   92582  Self pay                         0     mins [id number]    Un-Timed:  Electrical Stimulation:    0     mins  65401 ( )  Canalith Repos    0     mins 31862  Dry Needling     0     mins self-pay  Traction     0     mins 47822    Timed Treatment:   24   mins   Total Treatment:     24   mins      I certify that the therapy services are furnished while this patient is under my care.  The services outlined above are required by this patient, and will be reviewed every 90 days.

## 2024-08-30 ENCOUNTER — CLINICAL SUPPORT (OUTPATIENT)
Dept: FAMILY MEDICINE CLINIC | Facility: CLINIC | Age: 79
End: 2024-08-30
Payer: MEDICARE

## 2024-08-30 DIAGNOSIS — E53.8 VITAMIN B12 DEFICIENCY: Primary | ICD-10-CM

## 2024-08-30 PROCEDURE — 96372 THER/PROPH/DIAG INJ SC/IM: CPT | Performed by: STUDENT IN AN ORGANIZED HEALTH CARE EDUCATION/TRAINING PROGRAM

## 2024-08-30 RX ADMIN — CYANOCOBALAMIN 1000 MCG: 1000 INJECTION, SOLUTION INTRAMUSCULAR; SUBCUTANEOUS at 09:02

## 2024-09-13 ENCOUNTER — OFFICE VISIT (OUTPATIENT)
Dept: ORTHOPEDIC SURGERY | Facility: CLINIC | Age: 79
End: 2024-09-13
Payer: MEDICARE

## 2024-09-13 VITALS
SYSTOLIC BLOOD PRESSURE: 112 MMHG | OXYGEN SATURATION: 98 % | DIASTOLIC BLOOD PRESSURE: 69 MMHG | WEIGHT: 157 LBS | BODY MASS INDEX: 22.48 KG/M2 | HEIGHT: 70 IN | HEART RATE: 71 BPM

## 2024-09-13 DIAGNOSIS — M25.512 LEFT SHOULDER PAIN, UNSPECIFIED CHRONICITY: ICD-10-CM

## 2024-09-13 DIAGNOSIS — M75.112 NONTRAUMATIC INCOMPLETE TEAR OF LEFT ROTATOR CUFF: Primary | ICD-10-CM

## 2024-09-14 NOTE — PROGRESS NOTES
"Chief Complaint  Follow-up of the Left Shoulder    Subjective      Dioni Macias presents to [facility] for follow up of his left shoulder.  Patient was last seen in office on 8/2/2024 and states that he has been going to physical therapy.  He states that physical therapy has really helped improve his range of motion and discomfort that he is experiencing..  Like before, he states that he has some pain in the front of his shoulder but denies any falls or injuries.      No Known Allergies    Objective     Vital Signs:   Vitals:    09/13/24 1508   BP: 112/69   Pulse: 71   SpO2: 98%   Weight: 71.2 kg (157 lb)   Height: 177.8 cm (70\")     Body mass index is 22.53 kg/m².    I reviewed the patient's chief complaint, history of present illness, review of systems, past medical history, surgical history, family history, social history, medications, and allergy list.     REVIEW OF SYSTEMS    Constitutional: Denies fevers, chills, weight loss  Cardiovascular: Denies chest pain, shortness of breath  Skin: Denies rashes, acute skin changes  Neurologic: Denies headache, loss of consciousness  MSK: Left shoulder pain.     Ortho Exam  Shoulder   General: Alert. No acute distress.  Left upper Extremity: 180 degrees active elevation. External rotation to 65 degrees. Internal rotation to mid thoracic.  5/5 resisted shoulder abduction.  5/5 supraspinatus muscle testing without pain.  5/5 subscapularis muscle testing. 5/5 rotator cuff tendon testing.  90 degrees pronation and supination.  Tenderness palpation over bicipital groove.  Demonstrates intact active elbow ROM. Demonstrates intact active wrist ROM. Sensation intact. Palpable radial pulse. Neurovascularly intact.            Imaging Results (Most Recent)       None                Assessment and Plan   Diagnoses and all orders for this visit:    1. Nontraumatic incomplete tear of left rotator cuff (Primary)    2. Left shoulder pain, unspecified " chronicity         Dioni Macias presents today for a follow up of his left shoulder left shoulder pain and rotator cuff tear.     We discussed further conservative management for their left shoulder pain and rotator cuff tear. This includes but is not limited to - oral NSAIDs, topical NSAIDs, PT/home exercise program, intermittent steroid injections. Patient elected to continue conservative management. Home exercises were provided to patient today. Discussed NSAID use, precautions, and recommendations on taking.  Patient was previously prescribed diclofenac topical gel and encouraged to utilize that as needed.      Patient is eligible for repeat left shoulder steroid injection however he reports that his pain is located mainly along the biceps area.  We did discuss a biceps injection however patient deferred at this time stating that his pain is nowhere near where it was in the beginning.  Advised patient that he can return to clinic at any time for her left shoulder steroid injection if his pain would to worsen.  Patient verbalized understanding.      Tobacco Use: Medium Risk (9/13/2024)    Patient History     Smoking Tobacco Use: Former     Smokeless Tobacco Use: Former     Passive Exposure: Not on file     Patient reports they have a history of tobacco use; encouraged continued tobacco cessation for further health benefits.     BMI is within normal parameters. No other follow-up for BMI required.      Follow Up   Return if symptoms worsen or fail to improve.  There are no Patient Instructions on file for this visit.  Patient was given instructions and counseling regarding his condition or for health maintenance advice. Please see specific information pulled into the AVS if appropriate.       Dictated Utilizing Dragon Dictation. Please note that portions of this note were completed with a voice recognition program. Part of this note may be an electronic transcription/translation of spoken language to printed  text using the Dragon Dictation System.

## 2024-09-30 ENCOUNTER — CLINICAL SUPPORT (OUTPATIENT)
Dept: FAMILY MEDICINE CLINIC | Facility: CLINIC | Age: 79
End: 2024-09-30
Payer: MEDICARE

## 2024-09-30 PROCEDURE — 96372 THER/PROPH/DIAG INJ SC/IM: CPT | Performed by: STUDENT IN AN ORGANIZED HEALTH CARE EDUCATION/TRAINING PROGRAM

## 2024-09-30 RX ADMIN — CYANOCOBALAMIN 1000 MCG: 1000 INJECTION, SOLUTION INTRAMUSCULAR; SUBCUTANEOUS at 09:49

## 2024-10-24 RX ORDER — BLOOD SUGAR DIAGNOSTIC
STRIP MISCELLANEOUS
Qty: 100 EACH | Refills: 3 | Status: SHIPPED | OUTPATIENT
Start: 2024-10-24

## 2024-10-31 ENCOUNTER — CLINICAL SUPPORT (OUTPATIENT)
Dept: FAMILY MEDICINE CLINIC | Facility: CLINIC | Age: 79
End: 2024-10-31
Payer: MEDICARE

## 2024-10-31 DIAGNOSIS — D51.9 ANEMIA DUE TO VITAMIN B12 DEFICIENCY, UNSPECIFIED B12 DEFICIENCY TYPE: Primary | ICD-10-CM

## 2024-10-31 PROCEDURE — 96372 THER/PROPH/DIAG INJ SC/IM: CPT | Performed by: NURSE PRACTITIONER

## 2024-10-31 RX ADMIN — CYANOCOBALAMIN 1000 MCG: 1000 INJECTION, SOLUTION INTRAMUSCULAR; SUBCUTANEOUS at 10:26

## 2024-11-06 ENCOUNTER — CLINICAL SUPPORT (OUTPATIENT)
Dept: FAMILY MEDICINE CLINIC | Facility: CLINIC | Age: 79
End: 2024-11-06
Payer: MEDICARE

## 2024-11-06 DIAGNOSIS — Z23 NEED FOR INFLUENZA VACCINATION: Primary | ICD-10-CM

## 2024-11-06 PROCEDURE — 90662 IIV NO PRSV INCREASED AG IM: CPT | Performed by: FAMILY MEDICINE

## 2024-11-06 PROCEDURE — G0008 ADMIN INFLUENZA VIRUS VAC: HCPCS | Performed by: FAMILY MEDICINE

## 2024-11-22 ENCOUNTER — CLINICAL SUPPORT (OUTPATIENT)
Dept: FAMILY MEDICINE CLINIC | Facility: CLINIC | Age: 79
End: 2024-11-22
Payer: MEDICARE

## 2024-11-22 DIAGNOSIS — E53.8 VITAMIN B12 DEFICIENCY: Primary | ICD-10-CM

## 2024-11-22 RX ADMIN — CYANOCOBALAMIN 1000 MCG: 1000 INJECTION, SOLUTION INTRAMUSCULAR; SUBCUTANEOUS at 09:54

## 2024-12-27 ENCOUNTER — CLINICAL SUPPORT (OUTPATIENT)
Dept: FAMILY MEDICINE CLINIC | Facility: CLINIC | Age: 79
End: 2024-12-27
Payer: MEDICARE

## 2024-12-27 DIAGNOSIS — D51.9 ANEMIA DUE TO VITAMIN B12 DEFICIENCY, UNSPECIFIED B12 DEFICIENCY TYPE: Primary | ICD-10-CM

## 2024-12-27 PROCEDURE — 82607 VITAMIN B-12: CPT | Performed by: NURSE PRACTITIONER

## 2024-12-27 PROCEDURE — 96372 THER/PROPH/DIAG INJ SC/IM: CPT | Performed by: NURSE PRACTITIONER

## 2024-12-27 RX ADMIN — CYANOCOBALAMIN 1000 MCG: 1000 INJECTION, SOLUTION INTRAMUSCULAR; SUBCUTANEOUS at 10:28

## 2024-12-30 NOTE — PROGRESS NOTES
Subjective   The ABCs of the Annual Wellness Visit  Medicare Wellness Visit      Dioni Macias is a 79 y.o. patient who presents for a Medicare Wellness Visit.   DM2, anemia, HTN, hyperlipidemia, GERD, depression.      Patient had a spell possible low BS. Checks BS weekly  Last check 5-6 days ago it was 128 was working outside in the snow  Has not drank or eaten much before working outside      Labs blood 8/2024     A1C- 7/2024  Eye exam- 4/2024 garcia  Foot exam- checks at home    The following portions of the patient's history were reviewed and   updated as appropriate: allergies, current medications, past family history, past medical history, past social history, past surgical history, and problem list.    Compared to one year ago, the patient's physical   health is the same.  Compared to one year ago, the patient's mental   health is the same.    Recent Hospitalizations:  He was not admitted to the hospital during the last year.     Current Medical Providers:  Patient Care Team:  Yancy Gray APRN as PCP - General (Nurse Practitioner)    Outpatient Medications Prior to Visit   Medication Sig Dispense Refill    Acetaminophen (Tylenol) 325 MG capsule Take  by mouth.      aspirin 81 MG EC tablet Take 1 tablet by mouth Daily. 90 tablet 4    atorvastatin (LIPITOR) 40 MG tablet Take 1 tablet by mouth every night at bedtime. 90 tablet 4    donepezil (ARICEPT) 5 MG tablet Take 1 tablet by mouth Daily.      DULoxetine (CYMBALTA) 60 MG capsule Take 1 capsule by mouth Daily. 90 capsule 4    glucose blood (Glucose Meter Test) test strip check blood sugar every AM fasting and record. (Dx: E11.9) 100 each 3    glucose monitor monitoring kit check blood sugar every AM fasting and record. (Dx: E11.9) 1 each 0    Lancets 28G misc 1 each by Other route Daily. 100 each 2    meloxicam (Mobic) 7.5 MG tablet Take 1 tablet by mouth Daily. 90 tablet 1    metFORMIN ER (GLUCOPHAGE-XR) 500 MG 24 hr tablet Take 1 tablet  by mouth Daily With Breakfast. 90 tablet 4    Omega-3 Fatty Acids (FISH OIL CONCENTRATE PO) Take  by mouth.      Diclofenac Sodium (VOLTAREN) 1 % gel gel Apply 4 g topically to the appropriate area as directed 4 (Four) Times a Day. (Patient not taking: Reported on 1/7/2025) 4 g 1    gabapentin (NEURONTIN) 300 MG capsule Take 1 capsule by mouth Every Night. (Patient not taking: Reported on 1/7/2025) 90 capsule 1     Facility-Administered Medications Prior to Visit   Medication Dose Route Frequency Provider Last Rate Last Admin    cyanocobalamin injection 1,000 mcg  1,000 mcg Subcutaneous Q28 Days Sarath Yang MD   1,000 mcg at 11/22/24 0954    cyanocobalamin injection 1,000 mcg  1,000 mcg Intramuscular Q28 Days Sarath Yang MD   1,000 mcg at 12/27/24 1028     No opioid medication identified on active medication list. I have reviewed chart for other potential  high risk medication/s and harmful drug interactions in the elderly.      Aspirin is on active medication list. Aspirin use is indicated based on review of current medical condition/s. Pros and cons of this therapy have been discussed today. Benefits of this medication outweigh potential harm.  Patient has been encouraged to continue taking this medication.  .      Patient Active Problem List   Diagnosis    Depression, major, recurrent, mild    Diverticulosis of colon    Gastric reflux    High blood pressure    Other and unspecified hyperlipidemia    Mixed hyperlipidemia    Medication management    High cholesterol    Type 2 diabetes mellitus without complication, without long-term current use of insulin    Anemia due to vitamin B12 deficiency    Chronic midline low back pain without sciatica    DDD (degenerative disc disease), lumbar    Hip pain, right    Dizzy    Encounter for Medicare annual wellness exam    Insomnia     Advance Care Planning Advance Directive is on file.            Objective   Vitals:    01/07/25 1324   BP: 123/63  "  Pulse: 62   Temp: 98.5 °F (36.9 °C)   SpO2: (!) 85%   Weight: 69.8 kg (153 lb 12.8 oz)   Height: 177.8 cm (70\")       Estimated body mass index is 22.07 kg/m² as calculated from the following:    Height as of this encounter: 177.8 cm (70\").    Weight as of this encounter: 69.8 kg (153 lb 12.8 oz).    BMI is within normal parameters. No other follow-up for BMI required.           Does the patient have evidence of cognitive impairment? No                                                                                                Health  Risk Assessment    Smoking Status:  Social History     Tobacco Use   Smoking Status Former    Current packs/day: 0.00    Average packs/day: 1 pack/day for 30.0 years (30.0 ttl pk-yrs)    Types: Cigarettes    Start date:     Quit date:     Years since quittin.0   Smokeless Tobacco Former    Types: Chew, Snuff    Quit date:      Alcohol Consumption:  Social History     Substance and Sexual Activity   Alcohol Use Yes    Alcohol/week: 2.0 standard drinks of alcohol    Types: 2 Cans of beer per week       Fall Risk Screen  STEADI Fall Risk Assessment has not been completed.    Depression Screening   The PHQ has not been completed during this encounter.     Health Habits and Functional and Cognitive Screenin/28/2023     8:46 AM   Functional & Cognitive Status   Do you have difficulty preparing food and eating? No   Do you have difficulty bathing yourself, getting dressed or grooming yourself? No   Do you have difficulty using the toilet? Yes   Do you have difficulty moving around from place to place? No   Do you have trouble with steps or getting out of a bed or a chair? No   Current Diet Well Balanced Diet   Dental Exam Up to date   Eye Exam Not up to date   Exercise (times per week) 7 times per week   Current Exercises Include Walking;Yard Work;Stationary Bicycling/Spin Class   Do you need help using the phone?  No   Are you deaf or do you have serious " difficulty hearing?  No   Do you need help to go to places out of walking distance? No   Do you need help shopping? No   Do you need help preparing meals?  No   Do you need help with housework?  No   Do you need help with laundry? No   Do you need help taking your medications? No   Do you need help managing money? No   Do you ever drive or ride in a car without wearing a seat belt? No   Have you felt unusual stress, anger or loneliness in the last month? Yes   Who do you live with? Spouse   If you need help, do you have trouble finding someone available to you? No   Do you have difficulty concentrating, remembering or making decisions? Yes           Age-appropriate Screening Schedule:  Refer to the list below for future screening recommendations based on patient's age, sex and/or medical conditions. Orders for these recommended tests are listed in the plan section. The patient has been provided with a written plan.    Health Maintenance List  Health Maintenance   Topic Date Due    DIABETIC FOOT EXAM  Never done    DIABETIC EYE EXAM  01/31/2020    HEMOGLOBIN A1C  02/06/2025    ZOSTER VACCINE (2 of 2) 05/02/2025 (Originally 10/9/2018)    RSV Vaccine - Adults (1 - 1-dose 75+ series) 05/03/2025 (Originally 7/14/2018)    URINE MICROALBUMIN  06/28/2025    LIPID PANEL  08/06/2025    ANNUAL WELLNESS VISIT  01/07/2026    TDAP/TD VACCINES (2 - Tdap) 04/27/2034    COVID-19 Vaccine  Completed    INFLUENZA VACCINE  Completed    Pneumococcal Vaccine 65+  Completed                                                                                                                                                CMS Preventative Services Quick Reference  Risk Factors Identified During Encounter  Fall Risk-High or Moderate: Discussed Fall Prevention in the home    The above risks/problems have been discussed with the patient.  Pertinent information has been shared with the patient in the After Visit Summary.  An After Visit Summary and  "PPPS were made available to the patient.    Follow Up:  Next Medicare Wellness visit to be scheduled in 1 year.         Additional E&M Note during same encounter follows:  Patient has additional, significant, and separately identifiable condition(s)/problem(s) that require work above and beyond the Medicare Wellness Visit     Chief Complaint  Diabetes, Hypertension, Hyperlipidemia, Back Pain, and Hip Pain      HPI    follow up for DM2, anemia, HTN, hyperlipidemia, GERD, depression.  Review lab results      A1C- 7/2024  Eye exam- 4/2024 garcia  Foot exam- checks at home  Review of Systems   Constitutional:  Negative for fever.   Eyes:  Negative for visual disturbance.   Respiratory:  Negative for cough.    Cardiovascular:  Negative for chest pain.   Gastrointestinal:  Negative for abdominal pain.   Endocrine: Negative for polydipsia and polyuria.   Musculoskeletal:  Positive for arthralgias and back pain. Negative for myalgias.   Neurological:  Negative for light-headedness.        Pt c/o trouble sleeping, has tried melatonin, not helping     Also with low back pain and right hip pain, tosses and turns due to pain       Objective   Vital Signs:  /63   Pulse 62   Temp 98.5 °F (36.9 °C)   Ht 177.8 cm (70\")   Wt 69.8 kg (153 lb 12.8 oz)   SpO2 (!) 85%   BMI 22.07 kg/m²   Physical Exam  HENT:      Right Ear: Tympanic membrane normal.      Left Ear: Tympanic membrane normal.      Nose: Nose normal.      Mouth/Throat:      Mouth: Mucous membranes are moist.   Eyes:      Conjunctiva/sclera: Conjunctivae normal.   Neck:      Vascular: No carotid bruit.   Cardiovascular:      Rate and Rhythm: Normal rate and regular rhythm.      Pulses:           Dorsalis pedis pulses are 2+ on the right side and 2+ on the left side.        Posterior tibial pulses are 2+ on the right side and 2+ on the left side.      Heart sounds: Normal heart sounds. No murmur heard.  Pulmonary:      Effort: Pulmonary effort is normal.      " Breath sounds: Normal breath sounds.   Abdominal:      General: Bowel sounds are normal.      Palpations: Abdomen is soft.   Musculoskeletal:      Right lower leg: No edema.      Left lower leg: No edema.   Feet:      Right foot:      Protective Sensation: 10 sites tested.  9 sites sensed.      Skin integrity: Skin integrity normal.      Toenail Condition: Right toenails are abnormally thick.      Left foot:      Protective Sensation: 10 sites tested.  9 sites sensed.      Skin integrity: Skin integrity normal.      Toenail Condition: Left toenails are normal.   Lymphadenopathy:      Cervical: No cervical adenopathy.   Skin:     General: Skin is warm and dry.   Neurological:      Mental Status: He is alert.   Psychiatric:         Mood and Affect: Mood normal.         Behavior: Behavior normal.       Diabetic Foot Exam Performed and Monofilament Test Performed         Assessment and Plan          Encounter for Medicare annual wellness exam       Immunizations screening reviewed with patient  Type 2 diabetes mellitus without complication, without long-term current use of insulin  Will obtain hemoglobin A1c to monitor do recommend increasing protein intake  Orders:    Ambulatory Referral for Diabetic Eye Exam-Ophthalmology    Ambulatory Referral to Chronic Care Management Disease Education, Medication Adherence    Mixed hyperlipidemia  Will obtain lipid panel and CMP to monitor current statin dose Lipitor 40 mg at nighttime denies myalgias    Orders:    Ambulatory Referral to Chronic Care Management Disease Education, Medication Adherence    Right hip pain  Would like to try Voltaren gel  Orders:    Ambulatory Referral to Chronic Care Management Disease Education, Medication Adherence  Will refer to chronic care management to help aid in medication adherence will review with medications with patient also will help aid in monitoring glucose instructed patient to check glucose fasting before meals and 2 hours after a  meal will reach out to patient tomorrow to verify home medicines list was provided at that time we will determine if meloxicam is being taken  Chronic midline low back pain without sciatica  Patient stopped gabapentin did not feel that that was helping unsure if he is taking meloxicam  Orders:    Ambulatory Referral to Chronic Care Management Disease Education, Medication Adherence    Insomnia, unspecified type  Will trial trazodone did recommend not to mix with other OTC medications did discuss sleep hygiene and patient is active a minimum of 30 minutes each day       Depression, major, recurrent, mild    Currently controlled with Cymbalta       Medication management                 Follow Up   Will follow-up in 3 months  Patient was given instructions and counseling regarding his condition or for health maintenance advice. Please see specific information pulled into the AVS if appropriate.

## 2025-01-02 PROBLEM — Z00.00 ENCOUNTER FOR MEDICARE ANNUAL WELLNESS EXAM: Status: ACTIVE | Noted: 2025-01-02

## 2025-01-03 NOTE — ASSESSMENT & PLAN NOTE
Will obtain hemoglobin A1c to monitor do recommend increasing protein intake  Orders:    Ambulatory Referral for Diabetic Eye Exam-Ophthalmology    Ambulatory Referral to Chronic Care Management Disease Education, Medication Adherence

## 2025-01-03 NOTE — ASSESSMENT & PLAN NOTE
Patient stopped gabapentin did not feel that that was helping unsure if he is taking meloxicam  Orders:    Ambulatory Referral to Chronic Care Management Disease Education, Medication Adherence

## 2025-01-03 NOTE — ASSESSMENT & PLAN NOTE
Will obtain lipid panel and CMP to monitor current statin dose Lipitor 40 mg at nighttime denies myalgias    Orders:    Ambulatory Referral to Chronic Care Management Disease Education, Medication Adherence

## 2025-01-07 ENCOUNTER — LAB (OUTPATIENT)
Dept: LAB | Facility: HOSPITAL | Age: 80
End: 2025-01-07
Payer: MEDICARE

## 2025-01-07 ENCOUNTER — OFFICE VISIT (OUTPATIENT)
Dept: FAMILY MEDICINE CLINIC | Facility: CLINIC | Age: 80
End: 2025-01-07
Payer: MEDICARE

## 2025-01-07 VITALS
WEIGHT: 153.8 LBS | HEIGHT: 70 IN | OXYGEN SATURATION: 85 % | TEMPERATURE: 98.5 F | SYSTOLIC BLOOD PRESSURE: 123 MMHG | HEART RATE: 62 BPM | BODY MASS INDEX: 22.02 KG/M2 | DIASTOLIC BLOOD PRESSURE: 63 MMHG

## 2025-01-07 DIAGNOSIS — Z00.00 ENCOUNTER FOR MEDICARE ANNUAL WELLNESS EXAM: ICD-10-CM

## 2025-01-07 DIAGNOSIS — D51.9 ANEMIA DUE TO VITAMIN B12 DEFICIENCY, UNSPECIFIED B12 DEFICIENCY TYPE: ICD-10-CM

## 2025-01-07 DIAGNOSIS — E11.9 TYPE 2 DIABETES MELLITUS WITHOUT COMPLICATION, WITHOUT LONG-TERM CURRENT USE OF INSULIN: ICD-10-CM

## 2025-01-07 DIAGNOSIS — M54.50 CHRONIC MIDLINE LOW BACK PAIN WITHOUT SCIATICA: ICD-10-CM

## 2025-01-07 DIAGNOSIS — E78.2 MIXED HYPERLIPIDEMIA: ICD-10-CM

## 2025-01-07 DIAGNOSIS — G89.29 CHRONIC MIDLINE LOW BACK PAIN WITHOUT SCIATICA: ICD-10-CM

## 2025-01-07 DIAGNOSIS — G47.00 INSOMNIA, UNSPECIFIED TYPE: ICD-10-CM

## 2025-01-07 DIAGNOSIS — F33.0 DEPRESSION, MAJOR, RECURRENT, MILD: ICD-10-CM

## 2025-01-07 DIAGNOSIS — M25.551 RIGHT HIP PAIN: ICD-10-CM

## 2025-01-07 DIAGNOSIS — Z79.899 MEDICATION MANAGEMENT: ICD-10-CM

## 2025-01-07 LAB
ALBUMIN SERPL-MCNC: 4.3 G/DL (ref 3.5–5.2)
ALBUMIN/GLOB SERPL: 1.5 G/DL
ALP SERPL-CCNC: 91 U/L (ref 39–117)
ALT SERPL W P-5'-P-CCNC: 22 U/L (ref 1–41)
ANION GAP SERPL CALCULATED.3IONS-SCNC: 9 MMOL/L (ref 5–15)
AST SERPL-CCNC: 20 U/L (ref 1–40)
BASOPHILS # BLD AUTO: 0.04 10*3/MM3 (ref 0–0.2)
BASOPHILS NFR BLD AUTO: 0.3 % (ref 0–1.5)
BILIRUB SERPL-MCNC: 0.5 MG/DL (ref 0–1.2)
BILIRUB UR QL STRIP: NEGATIVE
BUN SERPL-MCNC: 22 MG/DL (ref 8–23)
BUN/CREAT SERPL: 18.2 (ref 7–25)
CALCIUM SPEC-SCNC: 10.1 MG/DL (ref 8.6–10.5)
CHLORIDE SERPL-SCNC: 99 MMOL/L (ref 98–107)
CHOLEST SERPL-MCNC: 106 MG/DL (ref 0–200)
CLARITY UR: CLEAR
CO2 SERPL-SCNC: 28 MMOL/L (ref 22–29)
COLOR UR: YELLOW
CREAT SERPL-MCNC: 1.21 MG/DL (ref 0.76–1.27)
DEPRECATED RDW RBC AUTO: 42.6 FL (ref 37–54)
EGFRCR SERPLBLD CKD-EPI 2021: 60.2 ML/MIN/1.73
EOSINOPHIL # BLD AUTO: 0.17 10*3/MM3 (ref 0–0.4)
EOSINOPHIL NFR BLD AUTO: 1.5 % (ref 0.3–6.2)
ERYTHROCYTE [DISTWIDTH] IN BLOOD BY AUTOMATED COUNT: 12.4 % (ref 12.3–15.4)
GLOBULIN UR ELPH-MCNC: 2.9 GM/DL
GLUCOSE SERPL-MCNC: 73 MG/DL (ref 65–99)
GLUCOSE UR STRIP-MCNC: NEGATIVE MG/DL
HBA1C MFR BLD: 6.2 % (ref 4.8–5.6)
HCT VFR BLD AUTO: 39.5 % (ref 37.5–51)
HDLC SERPL-MCNC: 37 MG/DL (ref 40–60)
HGB BLD-MCNC: 14.2 G/DL (ref 13–17.7)
HGB UR QL STRIP.AUTO: NEGATIVE
HOLD SPECIMEN: NORMAL
IMM GRANULOCYTES # BLD AUTO: 0.03 10*3/MM3 (ref 0–0.05)
IMM GRANULOCYTES NFR BLD AUTO: 0.3 % (ref 0–0.5)
KETONES UR QL STRIP: ABNORMAL
LDLC SERPL CALC-MCNC: 26 MG/DL (ref 0–100)
LDLC/HDLC SERPL: 0.28 {RATIO}
LEUKOCYTE ESTERASE UR QL STRIP.AUTO: NEGATIVE
LYMPHOCYTES # BLD AUTO: 2.25 10*3/MM3 (ref 0.7–3.1)
LYMPHOCYTES NFR BLD AUTO: 19.3 % (ref 19.6–45.3)
MCH RBC QN AUTO: 34 PG (ref 26.6–33)
MCHC RBC AUTO-ENTMCNC: 35.9 G/DL (ref 31.5–35.7)
MCV RBC AUTO: 94.5 FL (ref 79–97)
MONOCYTES # BLD AUTO: 1.4 10*3/MM3 (ref 0.1–0.9)
MONOCYTES NFR BLD AUTO: 12 % (ref 5–12)
NEUTROPHILS NFR BLD AUTO: 66.6 % (ref 42.7–76)
NEUTROPHILS NFR BLD AUTO: 7.78 10*3/MM3 (ref 1.7–7)
NITRITE UR QL STRIP: NEGATIVE
NRBC BLD AUTO-RTO: 0 /100 WBC (ref 0–0.2)
PH UR STRIP.AUTO: 5.5 [PH] (ref 5–8)
PLATELET # BLD AUTO: 272 10*3/MM3 (ref 140–450)
PMV BLD AUTO: 11.3 FL (ref 6–12)
POTASSIUM SERPL-SCNC: 4.5 MMOL/L (ref 3.5–5.2)
PROT SERPL-MCNC: 7.2 G/DL (ref 6–8.5)
PROT UR QL STRIP: NEGATIVE
RBC # BLD AUTO: 4.18 10*6/MM3 (ref 4.14–5.8)
SODIUM SERPL-SCNC: 136 MMOL/L (ref 136–145)
SP GR UR STRIP: 1.02 (ref 1–1.03)
TRIGL SERPL-MCNC: 293 MG/DL (ref 0–150)
TSH SERPL DL<=0.05 MIU/L-ACNC: 1.2 UIU/ML (ref 0.27–4.2)
UROBILINOGEN UR QL STRIP: ABNORMAL
VLDLC SERPL-MCNC: 43 MG/DL (ref 5–40)
WBC NRBC COR # BLD AUTO: 11.67 10*3/MM3 (ref 3.4–10.8)

## 2025-01-07 PROCEDURE — 82043 UR ALBUMIN QUANTITATIVE: CPT

## 2025-01-07 PROCEDURE — 85025 COMPLETE CBC W/AUTO DIFF WBC: CPT

## 2025-01-07 PROCEDURE — G0439 PPPS, SUBSEQ VISIT: HCPCS | Performed by: NURSE PRACTITIONER

## 2025-01-07 PROCEDURE — 3078F DIAST BP <80 MM HG: CPT | Performed by: NURSE PRACTITIONER

## 2025-01-07 PROCEDURE — 81003 URINALYSIS AUTO W/O SCOPE: CPT

## 2025-01-07 PROCEDURE — 3074F SYST BP LT 130 MM HG: CPT | Performed by: NURSE PRACTITIONER

## 2025-01-07 PROCEDURE — 99214 OFFICE O/P EST MOD 30 MIN: CPT | Performed by: NURSE PRACTITIONER

## 2025-01-07 PROCEDURE — 80053 COMPREHEN METABOLIC PANEL: CPT

## 2025-01-07 PROCEDURE — 82570 ASSAY OF URINE CREATININE: CPT

## 2025-01-07 PROCEDURE — 1126F AMNT PAIN NOTED NONE PRSNT: CPT | Performed by: NURSE PRACTITIONER

## 2025-01-07 PROCEDURE — 84443 ASSAY THYROID STIM HORMONE: CPT

## 2025-01-07 PROCEDURE — 83036 HEMOGLOBIN GLYCOSYLATED A1C: CPT

## 2025-01-07 PROCEDURE — 80061 LIPID PANEL: CPT

## 2025-01-07 RX ORDER — TRAZODONE HYDROCHLORIDE 50 MG/1
50 TABLET, FILM COATED ORAL NIGHTLY
Qty: 90 TABLET | Refills: 1 | Status: SHIPPED | OUTPATIENT
Start: 2025-01-07

## 2025-01-07 NOTE — ASSESSMENT & PLAN NOTE
Will trial trazodone did recommend not to mix with other OTC medications did discuss sleep hygiene and patient is active a minimum of 30 minutes each day

## 2025-01-08 ENCOUNTER — REFERRAL TRIAGE (OUTPATIENT)
Dept: CASE MANAGEMENT | Facility: OTHER | Age: 80
End: 2025-01-08
Payer: MEDICARE

## 2025-01-08 ENCOUNTER — TELEPHONE (OUTPATIENT)
Dept: CASE MANAGEMENT | Facility: OTHER | Age: 80
End: 2025-01-08
Payer: MEDICARE

## 2025-01-08 LAB
ALBUMIN UR-MCNC: 1.5 MG/DL
CREAT UR-MCNC: 132.4 MG/DL
MICROALBUMIN/CREAT UR: 11.3 MG/G (ref 0–29)

## 2025-01-08 RX ORDER — DONEPEZIL HYDROCHLORIDE 10 MG/1
10 TABLET, FILM COATED ORAL NIGHTLY
COMMUNITY
Start: 2024-12-03

## 2025-01-08 RX ORDER — MEMANTINE HYDROCHLORIDE 10 MG/1
10 TABLET ORAL 2 TIMES DAILY
COMMUNITY

## 2025-01-08 NOTE — TELEPHONE ENCOUNTER
Left message on voicemail to return my call. Plan to discuss:    Review medication list and confirm what he is taking  Needs to start glucose log---fbs and 2 hrs after a meal  Review lab results and PCP recommendations  Discuss diabetic diet

## 2025-01-09 ENCOUNTER — PATIENT OUTREACH (OUTPATIENT)
Dept: CASE MANAGEMENT | Facility: OTHER | Age: 80
End: 2025-01-09
Payer: MEDICARE

## 2025-01-09 ENCOUNTER — TELEPHONE (OUTPATIENT)
Dept: CASE MANAGEMENT | Facility: OTHER | Age: 80
End: 2025-01-09
Payer: MEDICARE

## 2025-01-09 DIAGNOSIS — Z79.899 MEDICATION MANAGEMENT: ICD-10-CM

## 2025-01-09 DIAGNOSIS — E11.9 TYPE 2 DIABETES MELLITUS WITHOUT COMPLICATION, WITHOUT LONG-TERM CURRENT USE OF INSULIN: Primary | ICD-10-CM

## 2025-01-09 RX ORDER — DICLOFENAC SODIUM 75 MG/1
75 TABLET, DELAYED RELEASE ORAL 2 TIMES DAILY
Qty: 60 TABLET | Refills: 1 | Status: SHIPPED | OUTPATIENT
Start: 2025-01-09

## 2025-01-09 NOTE — TELEPHONE ENCOUNTER
FYI      Medication list reviewed with Mr Macias. He reports no longer taking Mobic. This has been removed from medication list.     He is aware to stop Metformin per your instructions. This has also been removed from medication list.

## 2025-01-09 NOTE — OUTREACH NOTE
"Patient Outreach    Lab results reviewed with Mr Macias and PCP recommendations discussed:     Yancy Gray, APRN  1/8/2025  1:38 PM EST    Hgb A1c below goal of 6.5  With symptoms of low glucose recommend to stop metformin  Check glucose fasting and 2 hours after a meal each day for the next 2 weeks    He is aware and understands to check glucose levels fasting each am and again 2 hours after a meal daily. He was instructed to stop taking Metformin per PCP recommendation. I have scheduled follow up phone call in 2 weeks.     Diabetic diet discussed and I have mailed information on healthy meal planning to his home address.     I reviewed his medication list with him and he verified he is no longer taking Mobic. I have updated his list and made PCP aware.    Diabetic action plan and healthy food choices reviewed. He had no questions.      Names and Relationships of Patient/Support Persons: Contact: Dioni Macias \"Bill\"; Relationship: Self -         Education Documentation  Healthy Food Choices, taught by Richa Ramirez RN at 1/9/2025  9:47 AM.  Learner: Patient  Readiness: Acceptance  Method: Explanation  Response: Verbalizes Understanding          Richa PARIS  Ambulatory Case Management    1/9/2025, 09:48 EST  "

## 2025-01-20 ENCOUNTER — TELEPHONE (OUTPATIENT)
Dept: FAMILY MEDICINE CLINIC | Facility: CLINIC | Age: 80
End: 2025-01-20
Payer: MEDICARE

## 2025-01-20 RX ORDER — BLOOD SUGAR DIAGNOSTIC
STRIP MISCELLANEOUS
Qty: 100 EACH | Refills: 3 | Status: SHIPPED | OUTPATIENT
Start: 2025-01-20 | End: 2025-01-21 | Stop reason: SDUPTHER

## 2025-01-20 NOTE — TELEPHONE ENCOUNTER
"    Caller: Dioni Macias \"Bill\"    Relationship: Self    Best call back number: [phone]    Requested Prescriptions:   Requested Prescriptions     Pending Prescriptions Disp Refills    glucose blood (Glucose Meter Test) test strip 100 each 3     Sig: check blood sugar every AM fasting and record. (Dx: E11.9)        Pharmacy where request should be sent: [facility] [id number] - [place], [place] - [address] AT [facility] [address] - [phone] [facility] [phone] FX     Last office visit with prescribing clinician: 1/7/2025   Last telemedicine visit with prescribing clinician: Visit date not found   Next office visit with prescribing clinician: 4/8/2025     Does the patient have less than a 3 day supply:  [x] Yes  [] No    Would you like a call back once the refill request has been completed: [] Yes [x] No    If the office needs to give you a call back, can they leave a voicemail: [] Yes [x] No    Ana Huynh Rep   01/20/25 15:15 EST         "

## 2025-01-20 NOTE — TELEPHONE ENCOUNTER
Pharmacy Name: [facility] [id number] - [place], [place] - [address] AT [facility] [address] - [phone]  - [phone] FX     Reference Number (if applicable): NO    Pharmacy representative name: AYALA    Pharmacy representative phone number: [phone]     What medication are you calling in regards to: GLUCOSE TEST STRIPS     What question does the pharmacy have: CALLER STATED THAT TWO DIAGNOSIS CODES HAVE BEEN SENT AND NEEDING CONFIRMATION ON WHICH ONE TO USE IN ORDER TO FILL.     Who is the provider that prescribed the medication: CAROLINE TAO

## 2025-01-21 ENCOUNTER — CLINICAL SUPPORT (OUTPATIENT)
Dept: FAMILY MEDICINE CLINIC | Facility: CLINIC | Age: 80
End: 2025-01-21
Payer: MEDICARE

## 2025-01-21 DIAGNOSIS — D51.9 ANEMIA DUE TO VITAMIN B12 DEFICIENCY, UNSPECIFIED B12 DEFICIENCY TYPE: Primary | ICD-10-CM

## 2025-01-21 PROCEDURE — 96372 THER/PROPH/DIAG INJ SC/IM: CPT | Performed by: NURSE PRACTITIONER

## 2025-01-21 RX ORDER — BLOOD SUGAR DIAGNOSTIC
STRIP MISCELLANEOUS
Qty: 100 EACH | Refills: 3 | Status: SHIPPED | OUTPATIENT
Start: 2025-01-21

## 2025-01-21 RX ADMIN — CYANOCOBALAMIN 1000 MCG: 1000 INJECTION, SOLUTION INTRAMUSCULAR; SUBCUTANEOUS at 11:17

## 2025-01-21 NOTE — TELEPHONE ENCOUNTER
Called numbers listed in chart spoke to wife and Mr Macias and let them know I have talked to pharmacy an they are filling script now

## 2025-01-23 ENCOUNTER — PATIENT OUTREACH (OUTPATIENT)
Dept: CASE MANAGEMENT | Facility: OTHER | Age: 80
End: 2025-01-23
Payer: MEDICARE

## 2025-01-23 DIAGNOSIS — E11.9 TYPE 2 DIABETES MELLITUS WITHOUT COMPLICATION, WITHOUT LONG-TERM CURRENT USE OF INSULIN: Primary | ICD-10-CM

## 2025-01-23 DIAGNOSIS — Z79.899 MEDICATION MANAGEMENT: ICD-10-CM

## 2025-01-23 NOTE — OUTREACH NOTE
"Patient Outreach    Mr Macias had run out of glucose test strips and was having trouble getting refilled. Pharmacy had contacted office for needed information and this was provided. He was able to  on 1/22/25. He has only been checking glucose levels random days due to running out of strips. Reports avg fbs 115 and post prandial 150. He will begin new log--fbs and 2 hrs after meal daily. Call back is scheduled next week. Diabetic action plan reviewed. Reports taking medications as ordered.     Names and Relationships of Patient/Support Persons: Contact: Dioni Macias \"Bill\"; Relationship: Self -         Education Documentation  No documentation found.        Richa PARIS  Ambulatory Case Management    1/23/2025, 12:15 EST  "

## 2025-01-29 ENCOUNTER — PATIENT OUTREACH (OUTPATIENT)
Dept: CASE MANAGEMENT | Facility: OTHER | Age: 80
End: 2025-01-29
Payer: MEDICARE

## 2025-01-29 ENCOUNTER — TELEPHONE (OUTPATIENT)
Dept: CASE MANAGEMENT | Facility: OTHER | Age: 80
End: 2025-01-29
Payer: MEDICARE

## 2025-01-29 DIAGNOSIS — Z79.899 MEDICATION MANAGEMENT: ICD-10-CM

## 2025-01-29 DIAGNOSIS — E11.9 TYPE 2 DIABETES MELLITUS WITHOUT COMPLICATION, WITHOUT LONG-TERM CURRENT USE OF INSULIN: Primary | ICD-10-CM

## 2025-01-29 NOTE — OUTREACH NOTE
"Patient Outreach    date fbs 2 hr after meal   1/23 140 95   1/24 139 141   1/25 129 155   1/26 130 130   1/27 129 97   1/28 130 153   1/29 118        Metformin was stopped 1/9/24. Reports trying to eat healthier foods.     Names and Relationships of Patient/Support Persons: Contact: Dioni Macias \"Bill\"; Relationship: Self -         Education Documentation  No documentation found.        Richa PARIS  Ambulatory Case Management    1/29/2025, 13:19 EST  "

## 2025-01-29 NOTE — TELEPHONE ENCOUNTER
date fbs 2 hr after meal   1/23 140 95   1/24 139 141   1/25 129 155   1/26 130 130   1/27 129 97   1/28 130 153   1/29 118           Metformin was stopped 1/9/24. Reports trying to eat healthier foods.

## 2025-02-07 ENCOUNTER — TELEPHONE (OUTPATIENT)
Dept: CASE MANAGEMENT | Facility: OTHER | Age: 80
End: 2025-02-07
Payer: MEDICARE

## 2025-02-07 ENCOUNTER — PATIENT OUTREACH (OUTPATIENT)
Dept: CASE MANAGEMENT | Facility: OTHER | Age: 80
End: 2025-02-07
Payer: MEDICARE

## 2025-02-07 DIAGNOSIS — E11.9 TYPE 2 DIABETES MELLITUS WITHOUT COMPLICATION, WITHOUT LONG-TERM CURRENT USE OF INSULIN: Primary | ICD-10-CM

## 2025-02-07 DIAGNOSIS — Z79.899 MEDICATION MANAGEMENT: ICD-10-CM

## 2025-02-07 NOTE — OUTREACH NOTE
"Patient Outreach    Glucose log obtained:    date fbs 2 hr after dinner   2/1 154 117   2/2 126 132   2/3 130 114   2/4 130 154   2/5 105 117   2/6 117 106   2/7 137      Healthy food choices encouraged.    Follow up phone call scheduled in 2 weeks.       Names and Relationships of Patient/Support Persons: Contact: Dioni Macias \"Bill\"; Relationship: Self -         Education Documentation  No documentation found.        Rihca PARIS  Ambulatory Case Management    2/7/2025, 11:34 EST  "

## 2025-02-07 NOTE — TELEPHONE ENCOUNTER
Glucose log obtained:     date fbs 2 hr after dinner   2/1 154 117   2/2 126 132   2/3 130 114   2/4 130 154   2/5 105 117   2/6 117 106   2/7 137        Healthy food choices encouraged.     Follow up phone call scheduled in 2 weeks.

## 2025-02-17 ENCOUNTER — CLINICAL SUPPORT (OUTPATIENT)
Dept: FAMILY MEDICINE CLINIC | Facility: CLINIC | Age: 80
End: 2025-02-17
Payer: MEDICARE

## 2025-02-17 DIAGNOSIS — D51.9 ANEMIA DUE TO VITAMIN B12 DEFICIENCY, UNSPECIFIED B12 DEFICIENCY TYPE: Primary | ICD-10-CM

## 2025-02-17 PROCEDURE — 96372 THER/PROPH/DIAG INJ SC/IM: CPT | Performed by: NURSE PRACTITIONER

## 2025-02-17 RX ADMIN — CYANOCOBALAMIN 1000 MCG: 1000 INJECTION, SOLUTION INTRAMUSCULAR; SUBCUTANEOUS at 13:51

## 2025-02-21 ENCOUNTER — PATIENT OUTREACH (OUTPATIENT)
Dept: CASE MANAGEMENT | Facility: OTHER | Age: 80
End: 2025-02-21
Payer: MEDICARE

## 2025-02-21 ENCOUNTER — TELEPHONE (OUTPATIENT)
Dept: CASE MANAGEMENT | Facility: OTHER | Age: 80
End: 2025-02-21
Payer: MEDICARE

## 2025-02-21 DIAGNOSIS — Z79.899 MEDICATION MANAGEMENT: ICD-10-CM

## 2025-02-21 DIAGNOSIS — E11.9 TYPE 2 DIABETES MELLITUS WITHOUT COMPLICATION, WITHOUT LONG-TERM CURRENT USE OF INSULIN: Primary | ICD-10-CM

## 2025-02-21 NOTE — TELEPHONE ENCOUNTER
Just FYI        Glucose log obtained: Metformin stopped 1/9/25     date fbs 2 hr after meal   2/14 130 112   2/15 123 123   2/16 120 130   2/17 114 134   2/18 126 No longer checking twice daily   2/19 109     2/20 110        Glucose levels controlled since stopping Metformin. Mr Macias aware to continue to monitor fbs at least every other day. Instructed want to see fbs around . Instructed to obtain glucose level if feeling weak, lightheaded,shakiness, blurred vision,headache. We discussed Diabetic Action Plan and healthy food choices. He is aware to call with any questions or concerns.      Will follow for 30 days. If no new ACM needs plan to discharge.

## 2025-03-01 ENCOUNTER — APPOINTMENT (OUTPATIENT)
Dept: CT IMAGING | Facility: HOSPITAL | Age: 80
End: 2025-03-01
Payer: MEDICARE

## 2025-03-01 ENCOUNTER — APPOINTMENT (OUTPATIENT)
Dept: GENERAL RADIOLOGY | Facility: HOSPITAL | Age: 80
End: 2025-03-01
Payer: MEDICARE

## 2025-03-01 ENCOUNTER — HOSPITAL ENCOUNTER (EMERGENCY)
Facility: HOSPITAL | Age: 80
Discharge: HOME | End: 2025-03-01
Attending: EMERGENCY MEDICINE
Payer: MEDICARE

## 2025-03-01 VITALS
DIASTOLIC BLOOD PRESSURE: 54 MMHG | OXYGEN SATURATION: 100 % | RESPIRATION RATE: 18 BRPM | WEIGHT: 148.59 LBS | SYSTOLIC BLOOD PRESSURE: 121 MMHG | BODY MASS INDEX: 21.27 KG/M2 | HEIGHT: 70 IN | TEMPERATURE: 97.7 F | HEART RATE: 60 BPM

## 2025-03-01 DIAGNOSIS — R07.89 ATYPICAL CHEST PAIN: ICD-10-CM

## 2025-03-01 DIAGNOSIS — R42 DIZZINESS: Primary | ICD-10-CM

## 2025-03-01 LAB
ALBUMIN SERPL-MCNC: 4.5 G/DL (ref 3.5–5.2)
ALBUMIN/GLOB SERPL: 1.5 G/DL
ALP SERPL-CCNC: 80 U/L (ref 39–117)
ALT SERPL W P-5'-P-CCNC: 42 U/L (ref 1–41)
ANION GAP SERPL CALCULATED.3IONS-SCNC: 11.8 MMOL/L (ref 5–15)
AST SERPL-CCNC: 25 U/L (ref 1–40)
BASOPHILS # BLD AUTO: 0.06 10*3/MM3 (ref 0–0.2)
BASOPHILS NFR BLD AUTO: 0.7 % (ref 0–1.5)
BILIRUB SERPL-MCNC: 0.5 MG/DL (ref 0–1.2)
BILIRUB UR QL STRIP: NEGATIVE
BUN SERPL-MCNC: 20 MG/DL (ref 8–23)
BUN/CREAT SERPL: 16.7 (ref 7–25)
CALCIUM SPEC-SCNC: 9.8 MG/DL (ref 8.6–10.5)
CHLORIDE SERPL-SCNC: 100 MMOL/L (ref 98–107)
CLARITY UR: CLEAR
CO2 SERPL-SCNC: 27.2 MMOL/L (ref 22–29)
COLOR UR: YELLOW
CREAT SERPL-MCNC: 1.2 MG/DL (ref 0.76–1.27)
DEPRECATED RDW RBC AUTO: 46 FL (ref 37–54)
EGFRCR SERPLBLD CKD-EPI 2021: 60.8 ML/MIN/1.73
EOSINOPHIL # BLD AUTO: 0.12 10*3/MM3 (ref 0–0.4)
EOSINOPHIL NFR BLD AUTO: 1.3 % (ref 0.3–6.2)
ERYTHROCYTE [DISTWIDTH] IN BLOOD BY AUTOMATED COUNT: 12.9 % (ref 12.3–15.4)
FLUAV SUBTYP SPEC NAA+PROBE: NOT DETECTED
FLUBV RNA ISLT QL NAA+PROBE: NOT DETECTED
GEN 5 1HR TROPONIN T REFLEX: 16 NG/L
GLOBULIN UR ELPH-MCNC: 3.1 GM/DL
GLUCOSE SERPL-MCNC: 119 MG/DL (ref 65–99)
GLUCOSE UR STRIP-MCNC: NEGATIVE MG/DL
HCT VFR BLD AUTO: 42.7 % (ref 37.5–51)
HGB BLD-MCNC: 14.5 G/DL (ref 13–17.7)
HGB UR QL STRIP.AUTO: NEGATIVE
HOLD SPECIMEN: NORMAL
IMM GRANULOCYTES # BLD AUTO: 0.03 10*3/MM3 (ref 0–0.05)
IMM GRANULOCYTES NFR BLD AUTO: 0.3 % (ref 0–0.5)
KETONES UR QL STRIP: NEGATIVE
LEUKOCYTE ESTERASE UR QL STRIP.AUTO: NEGATIVE
LYMPHOCYTES # BLD AUTO: 1.76 10*3/MM3 (ref 0.7–3.1)
LYMPHOCYTES NFR BLD AUTO: 19.1 % (ref 19.6–45.3)
MAGNESIUM SERPL-MCNC: 2.3 MG/DL (ref 1.6–2.4)
MCH RBC QN AUTO: 33 PG (ref 26.6–33)
MCHC RBC AUTO-ENTMCNC: 34 G/DL (ref 31.5–35.7)
MCV RBC AUTO: 97.3 FL (ref 79–97)
MONOCYTES # BLD AUTO: 0.82 10*3/MM3 (ref 0.1–0.9)
MONOCYTES NFR BLD AUTO: 8.9 % (ref 5–12)
NEUTROPHILS NFR BLD AUTO: 6.44 10*3/MM3 (ref 1.7–7)
NEUTROPHILS NFR BLD AUTO: 69.7 % (ref 42.7–76)
NITRITE UR QL STRIP: NEGATIVE
NRBC BLD AUTO-RTO: 0 /100 WBC (ref 0–0.2)
PH UR STRIP.AUTO: 8.5 [PH] (ref 5–8)
PLATELET # BLD AUTO: 290 10*3/MM3 (ref 140–450)
PMV BLD AUTO: 10.4 FL (ref 6–12)
POTASSIUM SERPL-SCNC: 4.7 MMOL/L (ref 3.5–5.2)
PROT SERPL-MCNC: 7.6 G/DL (ref 6–8.5)
PROT UR QL STRIP: NEGATIVE
QT INTERVAL: 427 MS
QTC INTERVAL: 412 MS
RBC # BLD AUTO: 4.39 10*6/MM3 (ref 4.14–5.8)
RSV RNA NPH QL NAA+NON-PROBE: NOT DETECTED
SARS-COV-2 RNA RESP QL NAA+PROBE: NOT DETECTED
SODIUM SERPL-SCNC: 139 MMOL/L (ref 136–145)
SP GR UR STRIP: 1.02 (ref 1–1.03)
TROPONIN T NUMERIC DELTA: -3 NG/L
TROPONIN T SERPL HS-MCNC: 19 NG/L
UROBILINOGEN UR QL STRIP: ABNORMAL
WBC NRBC COR # BLD AUTO: 9.23 10*3/MM3 (ref 3.4–10.8)
WHOLE BLOOD HOLD COAG: NORMAL
WHOLE BLOOD HOLD SPECIMEN: NORMAL

## 2025-03-01 PROCEDURE — 93005 ELECTROCARDIOGRAM TRACING: CPT | Performed by: EMERGENCY MEDICINE

## 2025-03-01 PROCEDURE — 83735 ASSAY OF MAGNESIUM: CPT | Performed by: EMERGENCY MEDICINE

## 2025-03-01 PROCEDURE — 85025 COMPLETE CBC W/AUTO DIFF WBC: CPT | Performed by: EMERGENCY MEDICINE

## 2025-03-01 PROCEDURE — 99285 EMERGENCY DEPT VISIT HI MDM: CPT

## 2025-03-01 PROCEDURE — 84484 ASSAY OF TROPONIN QUANT: CPT | Performed by: EMERGENCY MEDICINE

## 2025-03-01 PROCEDURE — 71275 CT ANGIOGRAPHY CHEST: CPT

## 2025-03-01 PROCEDURE — 25810000003 SODIUM CHLORIDE 0.9 % SOLUTION: Performed by: EMERGENCY MEDICINE

## 2025-03-01 PROCEDURE — 81003 URINALYSIS AUTO W/O SCOPE: CPT | Performed by: EMERGENCY MEDICINE

## 2025-03-01 PROCEDURE — 25510000001 IOPAMIDOL PER 1 ML: Performed by: EMERGENCY MEDICINE

## 2025-03-01 PROCEDURE — 36415 COLL VENOUS BLD VENIPUNCTURE: CPT | Performed by: EMERGENCY MEDICINE

## 2025-03-01 PROCEDURE — 96360 HYDRATION IV INFUSION INIT: CPT

## 2025-03-01 PROCEDURE — 87637 SARSCOV2&INF A&B&RSV AMP PRB: CPT | Performed by: EMERGENCY MEDICINE

## 2025-03-01 PROCEDURE — 71045 X-RAY EXAM CHEST 1 VIEW: CPT

## 2025-03-01 PROCEDURE — 80053 COMPREHEN METABOLIC PANEL: CPT | Performed by: EMERGENCY MEDICINE

## 2025-03-01 PROCEDURE — 70450 CT HEAD/BRAIN W/O DYE: CPT

## 2025-03-01 RX ORDER — IOPAMIDOL 755 MG/ML
100 INJECTION, SOLUTION INTRAVASCULAR
Status: COMPLETED | OUTPATIENT
Start: 2025-03-01 | End: 2025-03-01

## 2025-03-01 RX ORDER — SODIUM CHLORIDE 0.9 % (FLUSH) 0.9 %
10 SYRINGE (ML) INJECTION AS NEEDED
Status: DISCONTINUED | OUTPATIENT
Start: 2025-03-01 | End: 2025-03-01 | Stop reason: HOSPADM

## 2025-03-01 RX ORDER — MECLIZINE HYDROCHLORIDE 25 MG/1
25 TABLET ORAL ONCE
Status: COMPLETED | OUTPATIENT
Start: 2025-03-01 | End: 2025-03-01

## 2025-03-01 RX ADMIN — SODIUM CHLORIDE 1000 ML: 9 INJECTION, SOLUTION INTRAVENOUS at 10:32

## 2025-03-01 RX ADMIN — IOPAMIDOL 100 ML: 755 INJECTION, SOLUTION INTRAVENOUS at 13:29

## 2025-03-01 RX ADMIN — MECLIZINE HYDROCHLORIDE 25 MG: 25 TABLET ORAL at 10:32

## 2025-03-01 NOTE — ED PROVIDER NOTES
Time: 10:06 AM EST  Date of encounter:  3/1/2025  Independent Historian/Clinical History and Information was obtained by:   Patient and Family    History is limited by: N/A    Chief Complaint: Dizziness      History of Present Illness:  Patient is a 79 y.o. year old male who presents to the emergency department for evaluation of dizziness.  Patient has a history of hyperlipidemia, diabetes who presents with complaints of dizziness.  States he woke up this morning and he was dizzy.  States that he has been having dizziness issues for about a year.  He states when he gets up and moves he gets dizzy.  He also reports that he was a little short of breath as well as sweaty this morning.  Family does report he has had dizziness episodes previously as well as some shortness of breath.  He states he is feeling fine when he is laying down at this time.  Denies history of vertigo but does have a history of dizziness.  No current complaints this time.      Patient Care Team  Primary Care Provider: Yancy Gray APRN    Past Medical History:     No Known Allergies  Past Medical History:   Diagnosis Date    Diabetes mellitus      History reviewed. No pertinent surgical history.  History reviewed. No pertinent family history.    Home Medications:  Prior to Admission medications    Medication Sig Start Date End Date Taking? Authorizing Provider   Acetaminophen (Tylenol) 325 MG capsule Take  by mouth.    Provider, MD Елена   aspirin 81 MG EC tablet Take 1 tablet by mouth Daily. 4/4/24   Sarath Yang MD   atorvastatin (LIPITOR) 40 MG tablet Take 1 tablet by mouth every night at bedtime. 4/4/24   Sarath Yang MD   diclofenac (VOLTAREN) 75 MG EC tablet Take 1 tablet by mouth 2 (Two) Times a Day. 1/9/25   Yancy Gray APRN   Diclofenac Sodium (VOLTAREN) 1 % gel gel Apply 4 g topically to the appropriate area as directed 4 (Four) Times a Day. 1/7/25   Yancy Gray APRN  "  donepezil (ARICEPT) 10 MG tablet Take 1 tablet by mouth Every Night. 12/3/24   Елена Marquez MD   DULoxetine (CYMBALTA) 60 MG capsule Take 1 capsule by mouth Daily. 24   Sarath Yang MD   glucose blood (Glucose Meter Test) test strip check blood sugar every AM fasting and record. (Dx: E11.9) 25   Yancy Gray APRN   glucose monitor monitoring kit check blood sugar every AM fasting and record. (Dx: E11.9) 23   Zia Rivers,    Lancets 28G misc 1 each by Other route Daily. 24   Yancy Gray APRN   memantine (NAMENDA) 10 MG tablet Take 1 tablet by mouth 2 (Two) Times a Day.    лЕена Marquez MD   Omega-3 Fatty Acids (FISH OIL CONCENTRATE PO) Take  by mouth.    Елена Marquez MD   traZODone (DESYREL) 50 MG tablet Take 1 tablet by mouth Every Night. 25   Yancy Gray APRN        Social History:   Social History     Tobacco Use    Smoking status: Former     Current packs/day: 0.00     Average packs/day: 1 pack/day for 30.0 years (30.0 ttl pk-yrs)     Types: Cigarettes     Start date:      Quit date:      Years since quittin.1    Smokeless tobacco: Former     Types: Chew, Snuff     Quit date:    Vaping Use    Vaping status: Never Used   Substance Use Topics    Alcohol use: Yes     Alcohol/week: 2.0 standard drinks of alcohol     Types: 2 Cans of beer per week    Drug use: Never     Comment: gabapentin         Review of Systems:  Review of Systems   Respiratory:  Positive for shortness of breath.    Neurological:  Positive for dizziness.        Physical Exam:  /73 (BP Location: Right arm, Patient Position: Sitting)   Pulse 55   Temp 97.7 °F (36.5 °C) (Oral)   Resp 18   Ht 177.8 cm (70\")   Wt 67.4 kg (148 lb 9.4 oz)   SpO2 100%   BMI 21.32 kg/m²     Physical Exam  Vitals and nursing note reviewed.   Constitutional:       Appearance: Normal appearance.   HENT:      Head: Normocephalic " and atraumatic.   Eyes:      General: No scleral icterus.  Cardiovascular:      Rate and Rhythm: Normal rate and regular rhythm.      Heart sounds: Normal heart sounds.   Pulmonary:      Effort: Pulmonary effort is normal.      Breath sounds: Normal breath sounds.   Abdominal:      Palpations: Abdomen is soft.      Tenderness: There is no abdominal tenderness.   Musculoskeletal:         General: Normal range of motion.      Cervical back: Normal range of motion.   Skin:     Findings: No rash.   Neurological:      General: No focal deficit present.      Mental Status: He is alert and oriented to person, place, and time.      Cranial Nerves: No cranial nerve deficit.      Sensory: No sensory deficit.      Motor: No weakness.                    Medical Decision Making:      Comorbidities that affect care:    Diabetes, hyperlipidemia    External Notes reviewed:    Reviewed note from 1/7/2025      The following orders were placed and all results were independently analyzed by me:  Orders Placed This Encounter   Procedures    COVID-19, FLU A/B, RSV PCR 1 HR TAT - Swab, Nasopharynx    XR Chest 1 View    CT Head Without Contrast    CT Angiogram Chest Pulmonary Embolism    [place] Draw    Comprehensive Metabolic Panel    High Sensitivity Troponin T    Magnesium    Urinalysis With Microscopic If Indicated (No Culture) - Urine, Clean Catch    CBC Auto Differential    High Sensitivity Troponin T 1Hr    NPO Diet NPO Type: Strict NPO    Undress & Gown    Continuous Pulse Oximetry    Vital Signs    Orthostatic Blood Pressure    Orthostatic Vitals (Blood Pressure & Heart Rate)    Oxygen Therapy- Nasal Cannula; Titrate 1-6 LPM Per SpO2; 90 - 95%    POC Glucose Once    ECG 12 Lead ED Triage Standing Order; Weak / Dizzy / AMS    ECG 12 Lead Chest Pain    Insert Peripheral IV    Fall Precautions    CBC & Differential    Green Top (Gel)    Lavender Top    Gold Top - SST    Light Blue Top    Extra Tubes    Gray Top       Medications  Given in the Emergency Department:  Medications   sodium chloride 0.9 % flush 10 mL (has no administration in time range)   sodium chloride 0.9 % bolus 1,000 mL (0 mL Intravenous Stopped 3/1/25 1135)   meclizine (ANTIVERT) tablet 25 mg (25 mg Oral Given 3/1/25 1032)   iopamidol (ISOVUE-370) 76 % injection 100 mL (100 mL Intravenous Given 3/1/25 1329)        ED Course:    ED Course as of 03/01/25 1435   Sat Mar 01, 2025   1131 EKG interpreted by me  0 819  Heart rate 52  Sinus, normal QRS, normal axis, no acute ischemia, missing lead V2 [MA]   1139 Called back to the room.  Patient stating he was having some chest discomfort/did not feel well.  States he cannot really describe what is going on.  States he just does not feel well.  Vitals are unremarkable at this time.  He is not diaphoretic.  There does not appear to be any changes in exam.  EKG is unchanged.  Will obtain a CT scan and reassess. [MA]   1434 Recheck patient.  Patient back to baseline at this time.  Discussed outpatient follow-up. [MA]      ED Course User Index  [MA] Kenyon Kang MD       Labs:    Lab Results (last 24 hours)       Procedure Component Value Units Date/Time    COVID-19, FLU A/B, RSV PCR 1 HR TAT - Swab, Nasopharynx [[id number]]  (Normal) Collected: 03/01/25 0904    Specimen: Swab from Nasopharynx Updated: 03/01/25 0955     COVID19 Not Detected     Influenza A PCR Not Detected     Influenza B PCR Not Detected     RSV, PCR Not Detected    Narrative:      Fact sheet for providers: https://www.fda.gov/media/796562/download    Fact sheet for patients: https://www.fda.gov/media/308292/download    Test performed by PCR.    CBC & Differential [[id number]]  (Abnormal) Collected: 03/01/25 0938    Specimen: Blood Updated: 03/01/25 0945    Narrative:      The following orders were created for panel order CBC & Differential.  Procedure                               Abnormality         Status                     ---------                                -----------         ------                     CBC Auto Differential[[id number]]        Abnormal            Final result                 Please view results for these tests on the individual orders.    Comprehensive Metabolic Panel [[id number]]  (Abnormal) Collected: 03/01/25 0938    Specimen: Blood Updated: 03/01/25 1013     Glucose 119 mg/dL      BUN 20 mg/dL      Creatinine 1.20 mg/dL      Sodium 139 mmol/L      Potassium 4.7 mmol/L      Chloride 100 mmol/L      CO2 27.2 mmol/L      Calcium 9.8 mg/dL      Total Protein 7.6 g/dL      Albumin 4.5 g/dL      ALT (SGPT) 42 U/L      AST (SGOT) 25 U/L      Alkaline Phosphatase 80 U/L      Total Bilirubin 0.5 mg/dL      Globulin 3.1 gm/dL      A/G Ratio 1.5 g/dL      BUN/Creatinine Ratio 16.7     Anion Gap 11.8 mmol/L      eGFR 60.8 mL/min/1.73     Narrative:      GFR Categories in Chronic Kidney Disease (CKD)      GFR Category          GFR (mL/min/1.73)    Interpretation  G1                     90 or greater         Normal or high (1)  G2                      60-89                Mild decrease (1)  G3a                   45-59                Mild to moderate decrease  G3b                   30-44                Moderate to severe decrease  G4                    15-29                Severe decrease  G5                    14 or less           Kidney failure          (1)In the absence of evidence of kidney disease, neither GFR category G1 or G2 fulfill the criteria for CKD.    eGFR calculation 2021 CKD-EPI creatinine equation, which does not include race as a factor    High Sensitivity Troponin T [[id number]]  (Normal) Collected: 03/01/25 0938    Specimen: Blood Updated: 03/01/25 1025     HS Troponin T 19 ng/L     Narrative:      High Sensitive Troponin T Reference Range:  <14.0 ng/L- Negative Female for AMI  <22.0 ng/L- Negative Male for AMI  >=14 - Abnormal Female indicating possible myocardial injury.  >=22 - Abnormal Male indicating possible myocardial injury.    Clinicians would have to utilize clinical acumen, EKG, Troponin, and serial changes to determine if it is an Acute Myocardial Infarction or myocardial injury due to an underlying chronic condition.         Magnesium [[id number]]  (Normal) Collected: 03/01/25 0938    Specimen: Blood Updated: 03/01/25 1013     Magnesium 2.3 mg/dL     CBC Auto Differential [[id number]]  (Abnormal) Collected: 03/01/25 0938    Specimen: Blood Updated: 03/01/25 0945     WBC 9.23 10*3/mm3      RBC 4.39 10*6/mm3      Hemoglobin 14.5 g/dL      Hematocrit 42.7 %      MCV 97.3 fL      MCH 33.0 pg      MCHC 34.0 g/dL      RDW 12.9 %      RDW-SD 46.0 fl      MPV 10.4 fL      Platelets 290 10*3/mm3      Neutrophil % 69.7 %      Lymphocyte % 19.1 %      Monocyte % 8.9 %      Eosinophil % 1.3 %      Basophil % 0.7 %      Immature Grans % 0.3 %      Neutrophils, Absolute 6.44 10*3/mm3      Lymphocytes, Absolute 1.76 10*3/mm3      Monocytes, Absolute 0.82 10*3/mm3      Eosinophils, Absolute 0.12 10*3/mm3      Basophils, Absolute 0.06 10*3/mm3      Immature Grans, Absolute 0.03 10*3/mm3      nRBC 0.0 /100 WBC     Urinalysis With Microscopic If Indicated (No Culture) - Urine, Clean Catch [[id number]]  (Abnormal) Collected: 03/01/25 1032    Specimen: Urine, Clean Catch Updated: 03/01/25 1054     Color, UA Yellow     Appearance, UA Clear     pH, UA 8.5     Specific Gravity, UA 1.016     Glucose, UA Negative     Ketones, UA Negative     Bilirubin, UA Negative     Blood, UA Negative     Protein, UA Negative     Leuk Esterase, UA Negative     Nitrite, UA Negative     Urobilinogen, UA 1.0 E.U./dL    Narrative:      Urine microscopic not indicated.    High Sensitivity Troponin T 1Hr [[id number]]  (Normal) Collected: 03/01/25 1055    Specimen: Blood from Arm, Right Updated: 03/01/25 1121     HS Troponin T 16 ng/L      Troponin T Numeric Delta -3 ng/L     Narrative:      High Sensitive Troponin T Reference Range:  <14.0 ng/L- Negative Female for AMI  <22.0 ng/L-  Negative Male for AMI  >=14 - Abnormal Female indicating possible myocardial injury.  >=22 - Abnormal Male indicating possible myocardial injury.   Clinicians would have to utilize clinical acumen, EKG, Troponin, and serial changes to determine if it is an Acute Myocardial Infarction or myocardial injury due to an underlying chronic condition.                  Imaging:    CT Angiogram Chest Pulmonary Embolism    Result Date: 3/1/2025  CT ANGIOGRAM CHEST PULMONARY EMBOLISM Date of Exam: 3/1/2025 1:15 PM EST Indication: chest pain. Comparison: Chest radiograph 3/1/2025 Technique: Axial CT images were obtained of the chest after the uneventful intravenous administration of iodinated contrast utilizing pulmonary embolism protocol.  Reconstructed coronal and sagittal images were also obtained. Automated exposure control and iterative construction methods were used. Findings: Aortic atherosclerotic disease without aneurysmal dilation. Heart size within normal limits. No pericardial effusion. No obvious calcified coronary disease. Esophagus unremarkable. No adenopathy. Normal caliber main pulmonary artery. No evidence of pulmonary embolism. Trachea patent. No infectious consolidation, edema, effusion or pneumothorax. Biapical pleural-parenchymal scarring. Linear bandlike atelectasis versus scar left upper lobe peripheral to a benign calcified granuloma. Adjacent mosaic attenuation left upper lobe likely representing component of air trapping. No suspicious pulmonary nodule. Minimal dependent atelectatic change. Cholecystectomy. No acute findings partially imaged upper abdomen. No acute chest wall findings. No axillary adenopathy. Minimal gynecomastia. Degenerative changes of the cervical spine with related grade 1 anterolisthesis C5 on C6. No acute displaced fracture or aggressive bone lesion.     Impression: 1. Negative for pulmonary embolism. 2. Benign left upper lobe pulmonary granuloma with adjacent linear bandlike  atelectasis/scar and focal air trapping. No infectious consolidation. 3. Aortic atherosclerotic disease. Electronically Signed: Greyson Barney MD  3/1/2025 1:52 PM EST  Workstation ID: [id number]    CT Head Without Contrast    Result Date: 3/1/2025  CT HEAD WO CONTRAST Date of Exam: 3/1/2025 10:25 AM EST Indication: dizziness headache. Comparison: CT head 4/20/2021 Technique: Axial CT images were obtained of the head without contrast administration.  Reconstructed coronal and sagittal images were also obtained. Automated exposure control and iterative construction methods were used. Findings: Negative for large territory infarct, acute intracranial hemorrhage, large mass lesion, midline shift or hydrocephalus. Mild to moderate global parenchymal volume loss for age. Minimal periventricular hypodensity similar to prior suggesting sequelae of chronic microvascular ischemic change. Distal carotid atherosclerotic disease. No large extra-axial fluid collection. Minimal nonobstructive sinus disease. No layering fluid. No large mastoid effusion. Negative for depressed skull fracture or aggressive lesion. Degenerative changes at the dens and both temporomandibular joints.     Impression: No acute intracranial findings by CT. Electronically Signed: Greyson Barney MD  3/1/2025 10:31 AM EST  Workstation ID: [id number]    XR Chest 1 View    Result Date: 3/1/2025  XR CHEST 1 VW Date of Exam: 3/1/2025 9:41 AM EST Indication: Weak/Dizzy/AMS triage protocol Comparison: Chest radiograph 8/15/2018 Findings: Heart size normal. Negative for lobar consolidation, edema, effusion or pneumothorax. Degenerative related osseous change. Aortic atherosclerotic disease.     Impression: No active pulmonary process. Electronically Signed: Greyson Barney MD  3/1/2025 9:44 AM EST  Workstation ID: [id number]       Differential Diagnosis and Discussion:    Dizziness: Based on the patient's history, signs, and symptoms, the diffential diagnosis includes  but is not limited to meningitis, stroke, sepsis, subarachnoid hemorrhage, intracranial bleeding, encephalitis, vertigo, electrolyte imbalance, and metabolic disorders.    PROCEDURES:    Labs were collected in the emergency department and all labs were reviewed and interpreted by me.  X-ray were performed in the emergency department and all X-ray impressions were independently interpreted by me.  An EKG was performed and the EKG was interpreted by me.    ECG 12 Lead ED Triage Standing Order; Weak / Dizzy / AMS   Preliminary Result   HEART RATE=56  bpm   RR Ipsmrayn=9707  ms   CO Dvgddgqd=908  ms   P Horizontal Axis=  deg   P Front Axis=72  deg   QRSD Interval=93  ms   QT Bscdexex=778  ms   SQbV=472  ms   QRS Axis=37  deg   T Wave Axis=65  deg   - NORMAL ECG -   Sinus rhythm   Date and Time of Study:2025-03-01 11:36:11      ECG 12 Lead Chest Pain    (Results Pending)       Procedures    MDM     Amount and/or Complexity of Data Reviewed  Clinical lab tests: reviewed  Tests in the radiology section of CPT®: reviewed  Tests in the medicine section of CPT®: reviewed  Decide to obtain previous medical records or to obtain history from someone other than the patient: yes       Patient is a 79-year-old who presents with complaints of dizziness as well as chest pain.  Exam is unremarkable and does appear to have some anxiety which could be contributory to it.  He states that he has dizziness but is also been having some intermittent chest pain as well as some tingling in his hands and his feet.  Labs and imaging here did not show any acute findings.  Patient has been observed for roughly 6 hours here with no acute findings.  He states that he is much better at this point.  I discussed with him outpatient follow-up versus admission to the hospital.  Prefers to do outpatient at this time.  Will DC and have the patient follow-up as outpatient.                Patient Care Considerations:          Consultants/Shared Management  Plan:    None    Social Determinants of Health:    Patient is independent, reliable, and has access to care.       Disposition and Care Coordination:    Discharged: I considered escalation of care by admitting this patient to the hospital, however 2 negative sets of enzymes as well as negative CT scan    I have explained the patient´s condition, diagnoses and treatment plan based on the information available to me at this time. I have answered questions and addressed any concerns. The patient has a good  understanding of the patient´s diagnosis, condition, and treatment plan as can be expected at this point. The vital signs have been stable. The patient´s condition is stable and appropriate for discharge from the emergency department.      The patient will pursue further outpatient evaluation with the primary care physician or other designated or consulting physician as outlined in the discharge instructions. They are agreeable to this plan of care and follow-up instructions have been explained in detail. The patient has received these instructions in written format and have expressed an understanding of the discharge instructions. The patient is aware that any significant change in condition or worsening of symptoms should prompt an immediate return to this or the closest emergency department or call to 911.      Final diagnoses:   Dizziness   Atypical chest pain        ED Disposition       ED Disposition   Discharge    Condition   Stable    Comment   --               This medical record created using voice recognition software.             Kenyon Kang MD  03/01/25 [id number]

## 2025-03-01 NOTE — DISCHARGE INSTRUCTIONS
Follow-up with your primary care doctor in the next few days.  Should you have new or worsening symptoms return emergency room.

## 2025-03-04 ENCOUNTER — PATIENT OUTREACH (OUTPATIENT)
Dept: CASE MANAGEMENT | Facility: OTHER | Age: 80
End: 2025-03-04
Payer: MEDICARE

## 2025-03-04 DIAGNOSIS — R42 LIGHTHEADEDNESS: Primary | ICD-10-CM

## 2025-03-04 NOTE — OUTREACH NOTE
"Patient Outreach    Pt to [facility] ER on 3/1/25 for c/o dizziness. Face to face with PCP and ER notes reviewed. I have made him a f/u appt with PCP on 3/5/25 at 1045 am. He is aware and agrees.     Names and Relationships of Patient/Support Persons: Contact: Dioni Macias \"Bill\"; Relationship: Self -         Education Documentation  No documentation found.        Richa PARIS  Ambulatory Case Management    3/4/2025, 08:22 EST  "

## 2025-03-05 ENCOUNTER — OFFICE VISIT (OUTPATIENT)
Dept: FAMILY MEDICINE CLINIC | Facility: CLINIC | Age: 80
End: 2025-03-05
Payer: MEDICARE

## 2025-03-05 VITALS
DIASTOLIC BLOOD PRESSURE: 66 MMHG | BODY MASS INDEX: 21.99 KG/M2 | HEIGHT: 70 IN | HEART RATE: 75 BPM | TEMPERATURE: 95.8 F | WEIGHT: 153.6 LBS | SYSTOLIC BLOOD PRESSURE: 134 MMHG | OXYGEN SATURATION: 90 %

## 2025-03-05 DIAGNOSIS — E11.9 TYPE 2 DIABETES MELLITUS WITHOUT COMPLICATION, WITHOUT LONG-TERM CURRENT USE OF INSULIN: ICD-10-CM

## 2025-03-05 DIAGNOSIS — R42 DIZZY: ICD-10-CM

## 2025-03-05 DIAGNOSIS — F33.0 DEPRESSION, MAJOR, RECURRENT, MILD: Primary | ICD-10-CM

## 2025-03-05 DIAGNOSIS — G47.00 INSOMNIA, UNSPECIFIED TYPE: ICD-10-CM

## 2025-03-05 DIAGNOSIS — R41.3 MEMORY LOSS: ICD-10-CM

## 2025-03-05 PROBLEM — F41.9 ANXIETY: Status: ACTIVE | Noted: 2025-03-05

## 2025-03-05 RX ORDER — DULOXETIN HYDROCHLORIDE 30 MG/1
30 CAPSULE, DELAYED RELEASE ORAL NIGHTLY
Qty: 30 CAPSULE | Refills: 1 | Status: SHIPPED | OUTPATIENT
Start: 2025-03-05

## 2025-03-05 RX ORDER — MIRTAZAPINE 15 MG/1
15 TABLET, FILM COATED ORAL NIGHTLY
COMMUNITY
End: 2025-03-05

## 2025-03-05 NOTE — PROGRESS NOTES
Follow Up Office Visit      Patient Name: Dioni Macias  : 1945   MRN: [id number]     Chief Complaint:    Chief Complaint   Patient presents with    Hospital Follow Up Visit       History of Present Illness: Dioni Macias is a 79 y.o. male who is here today to follow up for ER [facility]    Seen his new neurologist Monday       Patient is still having dizziness sometimes        Date of encounter:  3/1/2025   Chief Complaint: Dizziness   History of Present Illness:  Patient is a 79 y.o. year old male who presents to the emergency department for evaluation of dizziness.  Patient has a history of hyperlipidemia, diabetes who presents with complaints of dizziness.  States he woke up this morning and he was dizzy.  States that he has been having dizziness issues for about a year.  He states when he gets up and moves he gets dizzy.  He also reports that he was a little short of breath as well as sweaty this morning.  Family does report he has had dizziness episodes previously as well as some shortness of breath.  He states he is feeling fine when he is laying down at this time.  Denies history of vertigo but does have a history of dizziness.  No current complaints this time.      CT Angiogram Chest Pulmonary Embolism     Result Date: 3/1/2025  CT ANGIOGRAM CHEST PULMONARY EMBOLISM Date of Exam: 3/1/2025 1:15 PM EST Indication: chest pain. Comparison: Chest radiograph 3/1/2025 Technique: Axial CT images were obtained of the chest after the uneventful intravenous administration of iodinated contrast utilizing pulmonary embolism protocol.  Reconstructed coronal and sagittal images were also obtained. Automated exposure control and iterative construction methods were used. Findings: Aortic atherosclerotic disease without aneurysmal dilation. Heart size within normal limits. No pericardial effusion. No obvious calcified coronary disease. Esophagus unremarkable. No adenopathy. Normal caliber main  pulmonary artery. No evidence of pulmonary embolism. Trachea patent. No infectious consolidation, edema, effusion or pneumothorax. Biapical pleural-parenchymal scarring. Linear bandlike atelectasis versus scar left upper lobe peripheral to a benign calcified granuloma. Adjacent mosaic attenuation left upper lobe likely representing component of air trapping. No suspicious pulmonary nodule. Minimal dependent atelectatic change. Cholecystectomy. No acute findings partially imaged upper abdomen. No acute chest wall findings. No axillary adenopathy. Minimal gynecomastia. Degenerative changes of the cervical spine with related grade 1 anterolisthesis C5 on C6. No acute displaced fracture or aggressive bone lesion.      Impression: 1. Negative for pulmonary embolism. 2. Benign left upper lobe pulmonary granuloma with adjacent linear bandlike atelectasis/scar and focal air trapping. No infectious consolidation. 3. Aortic atherosclerotic disease. Electronically Signed: Greyson Barney MD  3/1/2025   CT Head Without Contrast     Result Date: 3/1/2025  CT HEAD WO CONTRAST Date of Exam: 3/1/2025 10:25 AM EST Indication: dizziness headache. Comparison: CT head 4/20/2021 Technique: Axial CT images were obtained of the head without contrast administration.  Reconstructed coronal and sagittal images were also obtained. Automated exposure control and iterative construction methods were used. Findings: Negative for large territory infarct, acute intracranial hemorrhage, large mass lesion, midline shift or hydrocephalus. Mild to moderate global parenchymal volume loss for age. Minimal periventricular hypodensity similar to prior suggesting sequelae of chronic microvascular ischemic change. Distal carotid atherosclerotic disease. No large extra-axial fluid collection. Minimal nonobstructive sinus disease. No layering fluid. No large mastoid effusion. Negative for depressed skull fracture or aggressive lesion. Degenerative changes at  the dens and both temporomandibular joints.      Impression: No acute intracranial findings by CT. Electronically Signed: Greyson Barney MD  3/1/2025         Patient is a 79-year-old who presents with complaints of dizziness as well as chest pain.  Exam is unremarkable and does appear to have some anxiety which could be contributory to it.  He states that he has dizziness but is also been having some intermittent chest pain as well as some tingling in his hands and his feet.  Labs and imaging here did not show any acute findings.  Patient has been observed for roughly 6 hours here with no acute findings.  He states that he is much better at this point.  I discussed with him outpatient follow-up versus admission to the hospital.  Prefers to do outpatient at this time.  Will DC and have the patient follow-up as outpatient.     Follow-up neurology per progress note 03/03/2025   ASSESSMENT    Memory loss    Other orders  - mirtazapine (REMERON) 7.5 MG tablet; Take 1 tablet by mouth nightly.    Mr. Macias returns to clinic for evaluation of Mild Cognitive Impairment. This was discussed in detail. I again reviewed his current status and treatment options. After discussing potential treatment options, it was elected to add mirtazapine and continue on his other medications unchanged.He will then follow up in 6 months , or sooner if needed.      Pt reports he started remeron x2 nights, dizziness persists when he wakes up in the mornings      Pt feels he would like to reduce some of his medications, and feels his depression is well controlled felt previous PCP prescribed cymbalta when he was suffering from depression when a family member overdose     Pt states he is not having trouble sleeping since starting trazodone       Subjective      Review of Systems:   Review of Systems   Constitutional:  Negative for fever.   HENT:  Negative for ear pain and sore throat.    Respiratory:  Negative for cough.    Cardiovascular:   Negative for chest pain.   Gastrointestinal:  Negative for abdominal pain.   Neurological:  Positive for dizziness and light-headedness.   Psychiatric/Behavioral:  Negative for sleep disturbance. The patient is not nervous/anxious.         Past Medical History:   Past Medical History:   Diagnosis Date    Diabetes mellitus        Past Surgical History: History reviewed. No pertinent surgical history.    Family History: History reviewed. No pertinent family history.    Social History:   Social History     Socioeconomic History    Marital status:    Tobacco Use    Smoking status: Former     Current packs/day: 0.00     Average packs/day: 1 pack/day for 30.0 years (30.0 ttl pk-yrs)     Types: Cigarettes     Start date:      Quit date:      Years since quittin.1    Smokeless tobacco: Former     Types: Chew, Snuff     Quit date:    Vaping Use    Vaping status: Never Used   Substance and Sexual Activity    Alcohol use: Yes     Alcohol/week: 2.0 standard drinks of alcohol     Types: 2 Cans of beer per week    Drug use: Never     Comment: gabapentin    Sexual activity: Defer       Medications:     Current Outpatient Medications:     aspirin 81 MG EC tablet, Take 1 tablet by mouth Daily., Disp: 90 tablet, Rfl: 4    atorvastatin (LIPITOR) 40 MG tablet, Take 1 tablet by mouth every night at bedtime., Disp: 90 tablet, Rfl: 4    donepezil (ARICEPT) 10 MG tablet, Take 1 tablet by mouth Every Night., Disp: , Rfl:     DULoxetine (CYMBALTA) 30 MG capsule, Take 1 capsule by mouth Every Night., Disp: 30 capsule, Rfl: 1    glucose monitor monitoring kit, check blood sugar every AM fasting and record. (Dx: E11.9), Disp: 1 each, Rfl: 0    Lancets 28G misc, 1 each by Other route Daily., Disp: 100 each, Rfl: 2    memantine (NAMENDA) 10 MG tablet, Take 1 tablet by mouth 2 (Two) Times a Day., Disp: , Rfl:     Omega-3 Fatty Acids (FISH OIL CONCENTRATE PO), Take  by mouth., Disp: , Rfl:     traZODone (DESYREL) 50 MG  "tablet, Take 1 tablet by mouth Every Night., Disp: 90 tablet, Rfl: 1    Acetaminophen (Tylenol) 325 MG capsule, Take  by mouth., Disp: , Rfl:     glucose blood (Glucose Meter Test) test strip, check blood sugar every AM fasting and record. (Dx: E11.9), Disp: 100 each, Rfl: 3    Current Facility-Administered Medications:     cyanocobalamin injection 1,000 mcg, 1,000 mcg, Subcutaneous, Q28 Days, Sarath Yang MD, 1,000 mcg at 02/17/25 1351    cyanocobalamin injection 1,000 mcg, 1,000 mcg, Intramuscular, Q28 Days, Sarath Yang MD, 1,000 mcg at 12/27/24 1028    Allergies:   No Known Allergies        Objective     Physical Exam:  Vital Signs:   Vitals:    03/05/25 1038   BP: 134/66   Pulse: 75   Temp: 95.8 °F (35.4 °C)   SpO2: 90%   Weight: 69.7 kg (153 lb 9.6 oz)   Height: 177.8 cm (70\")   PainSc: 0-No pain     Body mass index is 22.04 kg/m².   BMI is within normal parameters. No other follow-up for BMI required.       Physical Exam  HENT:      Right Ear: Tympanic membrane normal.      Left Ear: Tympanic membrane normal.      Nose: Nose normal.      Mouth/Throat:      Mouth: Mucous membranes are moist.   Eyes:      Conjunctiva/sclera: Conjunctivae normal.   Neck:      Vascular: No carotid bruit.   Cardiovascular:      Rate and Rhythm: Normal rate and regular rhythm.      Heart sounds: Normal heart sounds. No murmur heard.  Pulmonary:      Effort: Pulmonary effort is normal.      Breath sounds: Normal breath sounds.   Abdominal:      General: Bowel sounds are normal.      Palpations: Abdomen is soft.   Musculoskeletal:      Right lower leg: No edema.      Left lower leg: No edema.   Skin:     General: Skin is warm and dry.   Neurological:      Mental Status: He is alert.   Psychiatric:         Mood and Affect: Mood normal.         Behavior: Behavior normal.             Assessment / Plan      Assessment/Plan:   Diagnoses and all orders for this visit:    1. Depression, major, recurrent, mild " "(Primary)  -     DULoxetine (CYMBALTA) 30 MG capsule; Take 1 capsule by mouth Every Night.  Dispense: 30 capsule; Refill: 1    2. Insomnia, unspecified type    3. Memory loss    4. Dizzy    5. Type 2 diabetes mellitus without complication, without long-term current use of insulin    Depression has improved we will decrease Cymbalta to 30 mg daily as dizziness persists will discontinue Remeron as patient reports insomnia is well-controlled on trazodone will follow-up in 4 weeks to monitor chronic care management will continue to contact patient for follow-ups  Memory loss reviewed neurology note with patient and wife continues Namenda and Aricept at this time  Diabetes mellitus type 2 currently controlled without medication at this time do recommend consistent meals containing protein and a snack with protein before         Follow Up:   Return in about 4 weeks (around 4/2/2025).    Julieth Gray, JOSEE    \"Please note that portions of this note were completed with a voice recognition program.\"    "

## 2025-03-13 ENCOUNTER — CLINICAL SUPPORT (OUTPATIENT)
Dept: FAMILY MEDICINE CLINIC | Facility: CLINIC | Age: 80
End: 2025-03-13
Payer: MEDICARE

## 2025-03-13 DIAGNOSIS — D51.9 ANEMIA DUE TO VITAMIN B12 DEFICIENCY, UNSPECIFIED B12 DEFICIENCY TYPE: Primary | ICD-10-CM

## 2025-03-13 RX ADMIN — CYANOCOBALAMIN 1000 MCG: 1000 INJECTION, SOLUTION INTRAMUSCULAR; SUBCUTANEOUS at 10:44

## 2025-03-15 LAB
QT INTERVAL: 427 MS
QTC INTERVAL: 412 MS

## 2025-03-20 ENCOUNTER — PATIENT OUTREACH (OUTPATIENT)
Dept: CASE MANAGEMENT | Facility: OTHER | Age: 80
End: 2025-03-20
Payer: MEDICARE

## 2025-03-20 DIAGNOSIS — Z79.899 MEDICATION MANAGEMENT: ICD-10-CM

## 2025-03-20 DIAGNOSIS — E11.9 TYPE 2 DIABETES MELLITUS WITHOUT COMPLICATION, WITHOUT LONG-TERM CURRENT USE OF INSULIN: ICD-10-CM

## 2025-03-20 DIAGNOSIS — R42 LIGHTHEADEDNESS: Primary | ICD-10-CM

## 2025-03-20 NOTE — OUTREACH NOTE
"Patient Outreach    Reviewed last PCP visit prior to phone call.     Mr Macias reports no dizziness since stopping Remeron and decreasing Cymbalta to 30 mg every night.     He is checking glucose levels twice weekly. Recent readings below:    date fbs   3/12 140   3/15 126   3/19 136     Admits he does not always watch his diet but is trying. Healthy food choices discussed.     Reminded of next PCP appt on 4/8/25.     Follow up phone call scheduled. If no new needs arise will plan to discharge.      Names and Relationships of Patient/Support Persons: Contact: Dioni Macias \"Bill\"; Relationship: Self -         Education Documentation  No documentation found.        Richa PARIS  Ambulatory Case Management    3/20/2025, 14:01 EDT  "

## 2025-04-05 DIAGNOSIS — E78.2 MIXED HYPERLIPIDEMIA: ICD-10-CM

## 2025-04-07 RX ORDER — ATORVASTATIN CALCIUM 40 MG/1
40 TABLET, FILM COATED ORAL
Qty: 90 TABLET | Refills: 4 | Status: SHIPPED | OUTPATIENT
Start: 2025-04-07

## 2025-04-07 NOTE — PROGRESS NOTES
Follow Up Office Visit      Patient Name: Dioni Macias  : 1945   MRN: [id number]     Chief Complaint:    Chief Complaint   Patient presents with    Diabetes    Hypertension    Depression    Hyperlipidemia    Anemia       History of Present Illness: Dioni Macias is a 79 y.o. male who is here today to follow up for DM2, anemia, HTN, hyperlipidemia, GERD, depression.  Review lab results     A1C- 2025    Fasting glucose this morning 146, states he had a good super last night, sloppy alvino  Typically 100-110    Eye exam- 2024 garcia  Foot exam- checks at home      Also follow up  decrease Cymbalta to 30 mg daily   Pt reports  dizziness resolved   Also  discontinue Remeron as patient reports insomnia is well-controlled on trazodone    Pt c/o low back pain, with sitting long periods of time, exercising less with the rainy weather   Narrative & Impression   PROCEDURE:XR SPINE LUMBAR COMPLETE 4+VW     COMPARISON:[facility], CR, LS-SPINE COMPL W/OBLIQUE, 2010, 10:11.     INDICATIONS:SCIATICA. RIGHT DISTAL LUMBAR/RIGHT HIP PAIN FOR A MONTH. NO KNOWN INJURY/     FINDINGS:          No fracture or destructive bone lesion.  Prominent degenerative disc disease at L5-S1.  Moderate   degenerative disc disease at L3-L4 with slight L3 retrolisthesis.  Mild degenerative disc disease   in the remainder of the lumbar spine.  Facet osteoarthritis at L5-S1.  No pars defects     IMPRESSION:               Multilevel lumbar degenerative disc disease most severe at L5-S1            CARROL AARON MD         Electronically Signed and Approved By: CARROL AARON MD on 2022 at 15:21           Patient requests a steroid injection to help with the increased back pain recently taking Tylenol OTC for pain at this time    Subjective      Review of Systems:   Review of Systems   Constitutional:  Negative for fever.   Respiratory:  Negative for cough.    Cardiovascular:  Negative for chest pain.    Musculoskeletal:  Positive for back pain.   Neurological:  Negative for dizziness, light-headedness, numbness and headaches.        Past Medical History:   Past Medical History:   Diagnosis Date    Diabetes mellitus        Past Surgical History: History reviewed. No pertinent surgical history.    Family History: History reviewed. No pertinent family history.    Social History:   Social History     Socioeconomic History    Marital status:    Tobacco Use    Smoking status: Former     Current packs/day: 0.00     Average packs/day: 1 pack/day for 30.0 years (30.0 ttl pk-yrs)     Types: Cigarettes     Start date:      Quit date:      Years since quittin.2    Smokeless tobacco: Former     Types: Chew, Snuff     Quit date:    Vaping Use    Vaping status: Never Used   Substance and Sexual Activity    Alcohol use: Yes     Alcohol/week: 2.0 standard drinks of alcohol     Types: 2 Cans of beer per week    Drug use: Never     Comment: gabapentin    Sexual activity: Defer       Medications:     Current Outpatient Medications:     Acetaminophen (Tylenol) 325 MG capsule, Take  by mouth., Disp: , Rfl:     aspirin 81 MG EC tablet, Take 1 tablet by mouth Daily., Disp: 90 tablet, Rfl: 4    atorvastatin (LIPITOR) 40 MG tablet, TAKE 1 TABLET BY MOUTH EVERY NIGHT AT BEDTIME, Disp: 90 tablet, Rfl: 4    donepezil (ARICEPT) 10 MG tablet, Take 1 tablet by mouth Every Night., Disp: , Rfl:     DULoxetine (CYMBALTA) 30 MG capsule, Take 1 capsule by mouth Every Night., Disp: 30 capsule, Rfl: 1    glucose blood (Glucose Meter Test) test strip, check blood sugar every AM fasting and record. (Dx: E11.9), Disp: 100 each, Rfl: 3    glucose monitor monitoring kit, check blood sugar every AM fasting and record. (Dx: E11.9), Disp: 1 each, Rfl: 0    Lancets 28G misc, 1 each by Other route Daily., Disp: 100 each, Rfl: 2    memantine (NAMENDA) 10 MG tablet, Take 1 tablet by mouth 2 (Two) Times a Day., Disp: , Rfl:     Omega-3  "Fatty Acids (FISH OIL CONCENTRATE PO), Take  by mouth., Disp: , Rfl:     traZODone (DESYREL) 50 MG tablet, Take 1 tablet by mouth Every Night., Disp: 90 tablet, Rfl: 1    Current Facility-Administered Medications:     cyanocobalamin injection 1,000 mcg, 1,000 mcg, Subcutaneous, Q28 Days, Sarath Yang MD, 1,000 mcg at 04/08/25 1411    cyanocobalamin injection 1,000 mcg, 1,000 mcg, Intramuscular, Q28 Days, Sarath Yang MD, 1,000 mcg at 12/27/24 1028    Allergies:   No Known Allergies        Objective     Physical Exam:  Vital Signs:   Vitals:    04/08/25 1336   BP: 120/64   Pulse: 62   Temp: 97.3 °F (36.3 °C)   SpO2: 98%   Weight: 70.3 kg (155 lb)   Height: 177.8 cm (70\")   PainSc: 4    PainLoc: Back     Body mass index is 22.24 kg/m².   BMI is within normal parameters. No other follow-up for BMI required.       Physical Exam  HENT:      Nose: Nose normal.      Mouth/Throat:      Mouth: Mucous membranes are moist.   Eyes:      Conjunctiva/sclera: Conjunctivae normal.   Neck:      Thyroid: No thyroid tenderness.      Vascular: No carotid bruit.   Cardiovascular:      Rate and Rhythm: Normal rate and regular rhythm.      Heart sounds: Normal heart sounds. No murmur heard.  Pulmonary:      Effort: Pulmonary effort is normal.      Breath sounds: Normal breath sounds.   Abdominal:      General: Bowel sounds are normal.      Palpations: Abdomen is soft.   Musculoskeletal:      Right lower leg: No edema.      Left lower leg: No edema.   Skin:     General: Skin is warm and dry.   Neurological:      Mental Status: He is alert.   Psychiatric:         Mood and Affect: Mood normal.         Behavior: Behavior normal.             Assessment / Plan      Assessment/Plan:   Diagnoses and all orders for this visit:    1. Type 2 diabetes mellitus without complication, without long-term current use of insulin (Primary)  -     CBC Auto Differential  -     Comprehensive Metabolic Panel  -     Hemoglobin A1c  -     " "TSH  -     Urinalysis With Culture If Indicated -  -     Ambulatory Referral for Diabetic Eye Exam-Ophthalmology    2. Mixed hyperlipidemia  -     Comprehensive Metabolic Panel  -     Lipid Panel    3. Primary hypertension    4. Anxiety    5. Memory loss    6. Insomnia, unspecified type    7. Degeneration of intervertebral disc of lumbar region, unspecified whether pain present  -     methylPREDNISolone acetate (DEPO-medrol) injection 40 mg    8. Chronic midline low back pain without sciatica  -     methylPREDNISolone acetate (DEPO-medrol) injection 40 mg    9. B12 deficiency         Displaced type II obtain hemoglobin A1c to monitor home readings  appear well-controlled with diet and exercise at this time  Hyperlipidemia will obtain lipid panel and CMP to monitor current statin dose Lipitor 40 mg at nighttime denies myalgias  Hypertension controlled without medication  Anxiety stable at this time on reduced dose of Cymbalta 30 mg daily  Memory loss currently managed by neurology on Aricept and Namenda  Insomnia stable on trazodone denies morning sedation  Degenerative disc disease lumbar spine with chronic low back pain we will provide Depo-Medrol 40 mg IM in office discussed referral to pain management for EMILY of lumbar spine patient declined at this time if pain persist would recommend referral to physical therapy and pain management    Follow Up:   Return in about 6 months (around 10/8/2025).    Julieth Gray, JOSEE    \"Please note that portions of this note were completed with a voice recognition program.\"    "

## 2025-04-08 ENCOUNTER — OFFICE VISIT (OUTPATIENT)
Dept: FAMILY MEDICINE CLINIC | Facility: CLINIC | Age: 80
End: 2025-04-08
Payer: MEDICARE

## 2025-04-08 VITALS
HEART RATE: 62 BPM | SYSTOLIC BLOOD PRESSURE: 120 MMHG | HEIGHT: 70 IN | OXYGEN SATURATION: 98 % | DIASTOLIC BLOOD PRESSURE: 64 MMHG | BODY MASS INDEX: 22.19 KG/M2 | TEMPERATURE: 97.3 F | WEIGHT: 155 LBS

## 2025-04-08 DIAGNOSIS — R79.89 ELEVATED TROPONIN: ICD-10-CM

## 2025-04-08 DIAGNOSIS — Z79.899 MEDICATION MANAGEMENT: ICD-10-CM

## 2025-04-08 DIAGNOSIS — E11.65 TYPE 2 DIABETES MELLITUS WITH HYPERGLYCEMIA, WITHOUT LONG-TERM CURRENT USE OF INSULIN: ICD-10-CM

## 2025-04-08 DIAGNOSIS — M51.369 DEGENERATION OF INTERVERTEBRAL DISC OF LUMBAR REGION, UNSPECIFIED WHETHER PAIN PRESENT: ICD-10-CM

## 2025-04-08 DIAGNOSIS — M54.50 CHRONIC MIDLINE LOW BACK PAIN WITHOUT SCIATICA: ICD-10-CM

## 2025-04-08 DIAGNOSIS — F41.9 ANXIETY: ICD-10-CM

## 2025-04-08 DIAGNOSIS — G47.00 INSOMNIA, UNSPECIFIED TYPE: ICD-10-CM

## 2025-04-08 DIAGNOSIS — E53.8 B12 DEFICIENCY: ICD-10-CM

## 2025-04-08 DIAGNOSIS — E11.9 TYPE 2 DIABETES MELLITUS WITHOUT COMPLICATION, WITHOUT LONG-TERM CURRENT USE OF INSULIN: Primary | ICD-10-CM

## 2025-04-08 DIAGNOSIS — R41.3 MEMORY LOSS: ICD-10-CM

## 2025-04-08 DIAGNOSIS — E78.2 MIXED HYPERLIPIDEMIA: ICD-10-CM

## 2025-04-08 DIAGNOSIS — G89.29 CHRONIC MIDLINE LOW BACK PAIN WITHOUT SCIATICA: ICD-10-CM

## 2025-04-08 DIAGNOSIS — I10 PRIMARY HYPERTENSION: ICD-10-CM

## 2025-04-08 RX ORDER — METHYLPREDNISOLONE ACETATE 40 MG/ML
40 INJECTION, SUSPENSION INTRA-ARTICULAR; INTRALESIONAL; INTRAMUSCULAR; SOFT TISSUE ONCE
Status: COMPLETED | OUTPATIENT
Start: 2025-04-08 | End: 2025-04-08

## 2025-04-08 RX ADMIN — CYANOCOBALAMIN 1000 MCG: 1000 INJECTION, SOLUTION INTRAMUSCULAR; SUBCUTANEOUS at 14:11

## 2025-04-08 RX ADMIN — METHYLPREDNISOLONE ACETATE 40 MG: 40 INJECTION, SUSPENSION INTRA-ARTICULAR; INTRALESIONAL; INTRAMUSCULAR; SOFT TISSUE at 14:15

## 2025-04-14 ENCOUNTER — TELEPHONE (OUTPATIENT)
Dept: CASE MANAGEMENT | Facility: OTHER | Age: 80
End: 2025-04-14
Payer: MEDICARE

## 2025-04-14 ENCOUNTER — PATIENT OUTREACH (OUTPATIENT)
Dept: CASE MANAGEMENT | Facility: OTHER | Age: 80
End: 2025-04-14
Payer: MEDICARE

## 2025-04-14 DIAGNOSIS — Z79.899 MEDICATION MANAGEMENT: ICD-10-CM

## 2025-04-14 DIAGNOSIS — E11.9 TYPE 2 DIABETES MELLITUS WITHOUT COMPLICATION, WITHOUT LONG-TERM CURRENT USE OF INSULIN: Primary | ICD-10-CM

## 2025-04-14 RX ORDER — QUETIAPINE FUMARATE 50 MG/1
50 TABLET, FILM COATED ORAL NIGHTLY
COMMUNITY

## 2025-04-14 NOTE — OUTREACH NOTE
"Patient Outreach    PCP visit on 4/8/25. DM II and Hypertension controlled. Reports no further episodes of dizziness. Anxiety and Insomnia stable. Memory loss currently managed by Neurology. He reports placed on Fumarate 50 mg QHS by Neurology. I have added this to his medication list.     Advised of pending lab orders to have done.     Pt has no further needs for ACM. Aware to call if any needs arise.     Names and Relationships of Patient/Support Persons: Contact: Dioni Macias \"Bill\"; Relationship: Self -         Education Documentation  No documentation found.        Richa PARIS  Ambulatory Case Management    4/14/2025, 08:13 EDT  "

## 2025-04-29 DIAGNOSIS — F33.0 DEPRESSION, MAJOR, RECURRENT, MILD: ICD-10-CM

## 2025-04-29 RX ORDER — DULOXETIN HYDROCHLORIDE 30 MG/1
CAPSULE, DELAYED RELEASE ORAL
Qty: 30 CAPSULE | Refills: 1 | Status: SHIPPED | OUTPATIENT
Start: 2025-04-29

## 2025-05-08 ENCOUNTER — CLINICAL SUPPORT (OUTPATIENT)
Dept: FAMILY MEDICINE CLINIC | Facility: CLINIC | Age: 80
End: 2025-05-08
Payer: MEDICARE

## 2025-05-08 DIAGNOSIS — E53.8 B12 DEFICIENCY: Primary | ICD-10-CM

## 2025-05-08 RX ADMIN — CYANOCOBALAMIN 1000 MCG: 1000 INJECTION, SOLUTION INTRAMUSCULAR; SUBCUTANEOUS at 11:31

## 2025-05-09 ENCOUNTER — LAB (OUTPATIENT)
Facility: HOSPITAL | Age: 80
End: 2025-05-09
Payer: MEDICARE

## 2025-05-09 DIAGNOSIS — R82.998 HIGH URINE WHITE BLOOD CELL COUNT: ICD-10-CM

## 2025-05-09 LAB
ALBUMIN SERPL-MCNC: 4.4 G/DL (ref 3.5–5.2)
ALBUMIN UR-MCNC: 3.3 MG/DL
ALBUMIN/GLOB SERPL: 1.6 G/DL
ALP SERPL-CCNC: 83 U/L (ref 39–117)
ALT SERPL W P-5'-P-CCNC: 21 U/L (ref 1–41)
AMPHET+METHAMPHET UR QL: NEGATIVE
AMPHETAMINES UR QL: NEGATIVE
ANION GAP SERPL CALCULATED.3IONS-SCNC: 12.8 MMOL/L (ref 5–15)
AST SERPL-CCNC: 28 U/L (ref 1–40)
BARBITURATES UR QL SCN: NEGATIVE
BASOPHILS # BLD AUTO: 0.05 10*3/MM3 (ref 0–0.2)
BASOPHILS NFR BLD AUTO: 0.8 % (ref 0–1.5)
BENZODIAZ UR QL SCN: NEGATIVE
BILIRUB SERPL-MCNC: 0.5 MG/DL (ref 0–1.2)
BILIRUB UR QL STRIP: NEGATIVE
BILIRUB UR QL STRIP: NEGATIVE
BUN SERPL-MCNC: 19 MG/DL (ref 8–23)
BUN/CREAT SERPL: 14.7 (ref 7–25)
BUPRENORPHINE SERPL-MCNC: NEGATIVE NG/ML
CALCIUM SPEC-SCNC: 9.7 MG/DL (ref 8.6–10.5)
CANNABINOIDS SERPL QL: NEGATIVE
CHLORIDE SERPL-SCNC: 103 MMOL/L (ref 98–107)
CHOLEST SERPL-MCNC: 128 MG/DL (ref 0–200)
CLARITY UR: CLEAR
CLARITY UR: CLEAR
CO2 SERPL-SCNC: 25.2 MMOL/L (ref 22–29)
COCAINE UR QL: NEGATIVE
COLOR UR: YELLOW
COLOR UR: YELLOW
CREAT SERPL-MCNC: 1.29 MG/DL (ref 0.76–1.27)
CREAT UR-MCNC: 119.8 MG/DL
DEPRECATED RDW RBC AUTO: 43.8 FL (ref 37–54)
EGFRCR SERPLBLD CKD-EPI 2021: 55.7 ML/MIN/1.73
EOSINOPHIL # BLD AUTO: 0.27 10*3/MM3 (ref 0–0.4)
EOSINOPHIL NFR BLD AUTO: 4.4 % (ref 0.3–6.2)
ERYTHROCYTE [DISTWIDTH] IN BLOOD BY AUTOMATED COUNT: 12.5 % (ref 12.3–15.4)
FENTANYL UR-MCNC: NEGATIVE NG/ML
GLOBULIN UR ELPH-MCNC: 2.7 GM/DL
GLUCOSE SERPL-MCNC: 119 MG/DL (ref 65–99)
GLUCOSE UR STRIP-MCNC: NEGATIVE MG/DL
GLUCOSE UR STRIP-MCNC: NEGATIVE MG/DL
HBA1C MFR BLD: 6 % (ref 4.8–5.6)
HCT VFR BLD AUTO: 42.2 % (ref 37.5–51)
HDLC SERPL-MCNC: 37 MG/DL (ref 40–60)
HGB BLD-MCNC: 14.5 G/DL (ref 13–17.7)
HGB UR QL STRIP.AUTO: NEGATIVE
HGB UR QL STRIP.AUTO: NEGATIVE
HOLD SPECIMEN: NORMAL
IMM GRANULOCYTES # BLD AUTO: 0.01 10*3/MM3 (ref 0–0.05)
IMM GRANULOCYTES NFR BLD AUTO: 0.2 % (ref 0–0.5)
KETONES UR QL STRIP: ABNORMAL
KETONES UR QL STRIP: NEGATIVE
LDLC SERPL CALC-MCNC: 57 MG/DL (ref 0–100)
LDLC/HDLC SERPL: 1.34 {RATIO}
LEUKOCYTE ESTERASE UR QL STRIP.AUTO: NEGATIVE
LEUKOCYTE ESTERASE UR QL STRIP.AUTO: NEGATIVE
LYMPHOCYTES # BLD AUTO: 1.51 10*3/MM3 (ref 0.7–3.1)
LYMPHOCYTES NFR BLD AUTO: 24.8 % (ref 19.6–45.3)
MCH RBC QN AUTO: 33 PG (ref 26.6–33)
MCHC RBC AUTO-ENTMCNC: 34.4 G/DL (ref 31.5–35.7)
MCV RBC AUTO: 95.9 FL (ref 79–97)
METHADONE UR QL SCN: NEGATIVE
MICROALBUMIN/CREAT UR: 27.5 MG/G (ref 0–29)
MONOCYTES # BLD AUTO: 0.65 10*3/MM3 (ref 0.1–0.9)
MONOCYTES NFR BLD AUTO: 10.7 % (ref 5–12)
NEUTROPHILS NFR BLD AUTO: 3.61 10*3/MM3 (ref 1.7–7)
NEUTROPHILS NFR BLD AUTO: 59.1 % (ref 42.7–76)
NITRITE UR QL STRIP: NEGATIVE
NITRITE UR QL STRIP: NEGATIVE
NRBC BLD AUTO-RTO: 0 /100 WBC (ref 0–0.2)
OPIATES UR QL: NEGATIVE
OXYCODONE UR QL SCN: NEGATIVE
PCP UR QL SCN: NEGATIVE
PH UR STRIP.AUTO: 5.5 [PH] (ref 5–8)
PH UR STRIP.AUTO: 5.5 [PH] (ref 5–8)
PLATELET # BLD AUTO: 250 10*3/MM3 (ref 140–450)
PMV BLD AUTO: 11.3 FL (ref 6–12)
POTASSIUM SERPL-SCNC: 4.7 MMOL/L (ref 3.5–5.2)
PROT SERPL-MCNC: 7.1 G/DL (ref 6–8.5)
PROT UR QL STRIP: NEGATIVE
PROT UR QL STRIP: NEGATIVE
RBC # BLD AUTO: 4.4 10*6/MM3 (ref 4.14–5.8)
SODIUM SERPL-SCNC: 141 MMOL/L (ref 136–145)
SP GR UR STRIP: 1.02 (ref 1–1.03)
SP GR UR STRIP: 1.02 (ref 1–1.03)
TRICYCLICS UR QL SCN: POSITIVE
TRIGL SERPL-MCNC: 208 MG/DL (ref 0–150)
TROPONIN T SERPL HS-MCNC: 21 NG/L
TSH SERPL DL<=0.05 MIU/L-ACNC: 1.18 UIU/ML (ref 0.27–4.2)
UROBILINOGEN UR QL STRIP: ABNORMAL
UROBILINOGEN UR QL STRIP: NORMAL
VLDLC SERPL-MCNC: 34 MG/DL (ref 5–40)
WBC NRBC COR # BLD AUTO: 6.1 10*3/MM3 (ref 3.4–10.8)

## 2025-05-09 PROCEDURE — 80053 COMPREHEN METABOLIC PANEL: CPT | Performed by: NURSE PRACTITIONER

## 2025-05-09 PROCEDURE — 82570 ASSAY OF URINE CREATININE: CPT | Performed by: NURSE PRACTITIONER

## 2025-05-09 PROCEDURE — 80307 DRUG TEST PRSMV CHEM ANLYZR: CPT | Performed by: NURSE PRACTITIONER

## 2025-05-09 PROCEDURE — 83036 HEMOGLOBIN GLYCOSYLATED A1C: CPT | Performed by: NURSE PRACTITIONER

## 2025-05-09 PROCEDURE — 81003 URINALYSIS AUTO W/O SCOPE: CPT | Performed by: NURSE PRACTITIONER

## 2025-05-09 PROCEDURE — 84443 ASSAY THYROID STIM HORMONE: CPT | Performed by: NURSE PRACTITIONER

## 2025-05-09 PROCEDURE — 85025 COMPLETE CBC W/AUTO DIFF WBC: CPT | Performed by: NURSE PRACTITIONER

## 2025-05-09 PROCEDURE — 80061 LIPID PANEL: CPT | Performed by: NURSE PRACTITIONER

## 2025-05-09 PROCEDURE — 82043 UR ALBUMIN QUANTITATIVE: CPT | Performed by: NURSE PRACTITIONER

## 2025-05-24 DIAGNOSIS — F33.0 DEPRESSION, MAJOR, RECURRENT, MILD: ICD-10-CM

## 2025-05-27 RX ORDER — DULOXETIN HYDROCHLORIDE 60 MG/1
60 CAPSULE, DELAYED RELEASE ORAL DAILY
Qty: 90 CAPSULE | Refills: 4 | OUTPATIENT
Start: 2025-05-27

## 2025-06-05 ENCOUNTER — CLINICAL SUPPORT (OUTPATIENT)
Dept: FAMILY MEDICINE CLINIC | Facility: CLINIC | Age: 80
End: 2025-06-05
Payer: MEDICARE

## 2025-06-05 DIAGNOSIS — E53.8 B12 DEFICIENCY: Primary | ICD-10-CM

## 2025-06-05 RX ADMIN — CYANOCOBALAMIN 1000 MCG: 1000 INJECTION, SOLUTION INTRAMUSCULAR; SUBCUTANEOUS at 11:05

## 2025-06-21 DIAGNOSIS — E78.2 MIXED HYPERLIPIDEMIA: ICD-10-CM

## 2025-06-23 RX ORDER — ASPIRIN 81 MG/1
81 TABLET, COATED ORAL DAILY
Qty: 90 TABLET | Refills: 4 | Status: SHIPPED | OUTPATIENT
Start: 2025-06-23

## 2025-07-03 ENCOUNTER — OFFICE VISIT (OUTPATIENT)
Dept: FAMILY MEDICINE CLINIC | Facility: CLINIC | Age: 80
End: 2025-07-03
Payer: MEDICARE

## 2025-07-03 VITALS
WEIGHT: 158 LBS | SYSTOLIC BLOOD PRESSURE: 116 MMHG | BODY MASS INDEX: 22.62 KG/M2 | HEIGHT: 70 IN | TEMPERATURE: 97.2 F | OXYGEN SATURATION: 97 % | DIASTOLIC BLOOD PRESSURE: 63 MMHG

## 2025-07-03 DIAGNOSIS — M25.561 ACUTE PAIN OF RIGHT KNEE: Primary | ICD-10-CM

## 2025-07-03 DIAGNOSIS — E53.8 B12 DEFICIENCY: ICD-10-CM

## 2025-07-03 DIAGNOSIS — F33.0 DEPRESSION, MAJOR, RECURRENT, MILD: ICD-10-CM

## 2025-07-03 PROCEDURE — 3078F DIAST BP <80 MM HG: CPT | Performed by: FAMILY MEDICINE

## 2025-07-03 PROCEDURE — 1125F AMNT PAIN NOTED PAIN PRSNT: CPT | Performed by: FAMILY MEDICINE

## 2025-07-03 PROCEDURE — 3074F SYST BP LT 130 MM HG: CPT | Performed by: FAMILY MEDICINE

## 2025-07-03 RX ORDER — TRAZODONE HYDROCHLORIDE 50 MG/1
50 TABLET ORAL NIGHTLY
Qty: 90 TABLET | Refills: 1 | Status: SHIPPED | OUTPATIENT
Start: 2025-07-03

## 2025-07-03 RX ORDER — DULOXETIN HYDROCHLORIDE 30 MG/1
30 CAPSULE, DELAYED RELEASE ORAL DAILY
Qty: 30 CAPSULE | Refills: 1 | Status: SHIPPED | OUTPATIENT
Start: 2025-07-03

## 2025-07-03 RX ADMIN — CYANOCOBALAMIN 1000 MCG: 1000 INJECTION, SOLUTION INTRAMUSCULAR; SUBCUTANEOUS at 15:47

## 2025-07-03 NOTE — ASSESSMENT & PLAN NOTE
His knee is somewhat better today.  I think at this point in time I will hold off on injecting with steroids.  He will continue to wear his brace and ice and Tylenol as needed.  If it seems to get worse then we will get an x-ray at inject.

## 2025-07-03 NOTE — PROGRESS NOTES
Chief Complaint   Patient presents with    Knee Pain     Right         Subjective     Dioni Macias  has a past medical history of Diabetes mellitus.    Knee pain-he states 2 days ago he was outside working in his yard.  He lifts up and moves mulch to mulch around some flowers and was watering his yard and garden.  He states he just lifted up his sprinkler and started to walk when he had sudden pain in his right knee.  It has even been swollen since then.  He has worn a brace and ice and used a cane.  He feels that it is somewhat better today.  He has not had any locking or giving way.        PHQ-2 Depression Screening  Little interest or pleasure in doing things?     Feeling down, depressed, or hopeless?     PHQ-2 Total Score     PHQ-9 Depression Screening  Little interest or pleasure in doing things?     Feeling down, depressed, or hopeless?     Trouble falling or staying asleep, or sleeping too much?     Feeling tired or having little energy?     Poor appetite or overeating?     Feeling bad about yourself - or that you are a failure or have let yourself or your family down?     Trouble concentrating on things, such as reading the newspaper or watching television?     Moving or speaking so slowly that other people could have noticed? Or the opposite - being so fidgety or restless that you have been moving around a lot more than usual?     Thoughts that you would be better off dead, or of hurting yourself in some way?     PHQ-9 Total Score     If you checked off any problems, how difficult have these problems made it for you to do your work, take care of things at home, or get along with other people?       No Known Allergies    Prior to Admission medications    Medication Sig Start Date End Date Taking? Authorizing Provider   Acetaminophen (Tylenol) 325 MG capsule Take  by mouth.   Yes Provider, MD Елена   Aspirin Low Dose 81 MG EC tablet TAKE 1 TABLET BY MOUTH DAILY 6/23/25  Yes Sarath Yang  MD   atorvastatin (LIPITOR) 40 MG tablet TAKE 1 TABLET BY MOUTH EVERY NIGHT AT BEDTIME 4/7/25  Yes Sarath Yang MD   donepezil (ARICEPT) 10 MG tablet Take 1 tablet by mouth Every Night. 12/3/24  Yes ProviderЕлена MD   DULoxetine (CYMBALTA) 30 MG capsule Take 1 capsule by mouth Daily. 7/3/25  Yes Yancy Gray APRN   glucose blood (Glucose Meter Test) test strip check blood sugar every AM fasting and record. (Dx: E11.9) 1/21/25  Yes Yancy Gray APRN   glucose monitor monitoring kit check blood sugar every AM fasting and record. (Dx: E11.9) 11/19/23  Yes Zia Rivers, DO   Lancets 28G misc 1 each by Other route Daily. 6/14/24  Yes Yancy Gray APRN   memantine (NAMENDA) 10 MG tablet Take 1 tablet by mouth 2 (Two) Times a Day.   Yes Provider, MD Елена   Omega-3 Fatty Acids (FISH OIL CONCENTRATE PO) Take  by mouth.   Yes Provider, MD Елена   QUEtiapine (SEROquel) 50 MG tablet Take 1 tablet by mouth Every Night.   Yes Provider, MD Елена   traZODone (DESYREL) 50 MG tablet TAKE ONE TABLET BY MOUTH ONCE NIGHTLY 7/3/25  Yes Yancy Gray APRN   DULoxetine (CYMBALTA) 30 MG capsule TAKE 1 CAPSULE BY MOUTH ONCE NIGHTLY 4/29/25 7/3/25  Yancy Gray APRN   traZODone (DESYREL) 50 MG tablet Take 1 tablet by mouth Every Night. 1/7/25 7/3/25  Yancy Gray APRN        Patient Active Problem List   Diagnosis    Depression, major, recurrent, mild    Diverticulosis of colon    Gastric reflux    High blood pressure    Other and unspecified hyperlipidemia    Mixed hyperlipidemia    Medication management    High cholesterol    Type 2 diabetes mellitus without complication, without long-term current use of insulin    Anemia due to vitamin B12 deficiency    Chronic midline low back pain without sciatica    DDD (degenerative disc disease), lumbar    Hip pain, right    Dizzy    Encounter for Medicare annual wellness exam  "   Insomnia    Memory loss    Anxiety    B12 deficiency    Acute pain of right knee        History reviewed. No pertinent surgical history.    Social History     Socioeconomic History    Marital status:    Tobacco Use    Smoking status: Former     Current packs/day: 0.00     Average packs/day: 1 pack/day for 30.0 years (30.0 ttl pk-yrs)     Types: Cigarettes     Start date:      Quit date:      Years since quittin.5    Smokeless tobacco: Former     Types: Chew, Snuff     Quit date:    Vaping Use    Vaping status: Never Used   Substance and Sexual Activity    Alcohol use: Yes     Alcohol/week: 2.0 standard drinks of alcohol     Types: 2 Cans of beer per week    Drug use: Never     Comment: gabapentin    Sexual activity: Defer       History reviewed. No pertinent family history.    Family history, surgical history, past medical history, Allergies and meds reviewed with patient today and updated in [facility] EMR.     ROS:  Review of Systems   Musculoskeletal:  Positive for arthralgias and joint swelling.       OBJECTIVE:  Vitals:    25 1444   BP: 116/63   BP Location: Left arm   Patient Position: Sitting   Temp: 97.2 °F (36.2 °C)   SpO2: 97%   Weight: 71.7 kg (158 lb)   Height: 177.8 cm (70\")     No results found.   Body mass index is 22.67 kg/m².  No LMP for male patient.    The ASCVD Risk score (Melcher [place] DK, et al., 2019) failed to calculate for the following reasons:    The 2019 ASCVD risk score is only valid for ages 40 to 79     Physical Exam  Vitals and nursing note reviewed.   Constitutional:       General: He is not in acute distress.     Appearance: Normal appearance. He is normal weight.   Musculoskeletal:      Right knee: Effusion present. No deformity, bony tenderness or crepitus. Normal range of motion. Tenderness present over the lateral joint line. No LCL laxity, MCL laxity, ACL laxity or PCL laxity.      Instability Tests: Anterior drawer test negative. Posterior drawer test " negative. Anterior Lachman test negative. Medial Pancho test negative and lateral Pancho test negative.   Neurological:      Mental Status: He is alert.         Procedures    No visits with results within 30 Day(s) from this visit.   Latest known visit with results is:   Lab on 05/09/2025   Component Date Value Ref Range Status    Color, UA 05/09/2025 Yellow  Yellow, Straw Final    Appearance, UA 05/09/2025 Clear  Clear Final    pH, UA 05/09/2025 5.5  5.0 - 8.0 Final    Specific Gravity, UA 05/09/2025 1.018  1.005 - 1.030 Final    Glucose, UA 05/09/2025 Negative  Negative Final    Ketones, UA 05/09/2025 Negative  Negative Final    Bilirubin, UA 05/09/2025 Negative  Negative Final    Blood, UA 05/09/2025 Negative  Negative Final    Protein, UA 05/09/2025 Negative  Negative Final    Leuk Esterase, UA 05/09/2025 Negative  Negative Final    Nitrite, UA 05/09/2025 Negative  Negative Final    Urobilinogen, UA 05/09/2025 1.0 E.U./dL  0.2 - 1.0 E.U./dL Final       ASSESSMENT/ PLAN:    Diagnoses and all orders for this visit:    1. Acute pain of right knee (Primary)  Assessment & Plan:  His knee is somewhat better today.  I think at this point in time I will hold off on injecting with steroids.  He will continue to wear his brace and ice and Tylenol as needed.  If it seems to get worse then we will get an x-ray at inject.          BMI is within normal parameters. No other follow-up for BMI required.      Orders Placed Today:     No orders of the defined types were placed in this encounter.       Management Plan:     An After Visit Summary was printed and given to the patient at discharge.    Follow-up: No follow-ups on file.    Zia Rivers DO 7/3/2025 15:38 EDT  This note was electronically signed.

## 2025-07-09 DIAGNOSIS — F33.0 DEPRESSION, MAJOR, RECURRENT, MILD: ICD-10-CM

## 2025-07-09 RX ORDER — DULOXETIN HYDROCHLORIDE 30 MG/1
30 CAPSULE, DELAYED RELEASE ORAL DAILY
Qty: 30 CAPSULE | Refills: 2 | Status: SHIPPED | OUTPATIENT
Start: 2025-07-09

## 2025-08-05 ENCOUNTER — CLINICAL SUPPORT (OUTPATIENT)
Dept: FAMILY MEDICINE CLINIC | Facility: CLINIC | Age: 80
End: 2025-08-05
Payer: MEDICARE

## 2025-08-05 DIAGNOSIS — E53.8 B12 DEFICIENCY: Primary | ICD-10-CM

## 2025-08-05 PROCEDURE — 96372 THER/PROPH/DIAG INJ SC/IM: CPT | Performed by: NURSE PRACTITIONER

## 2025-08-05 RX ADMIN — CYANOCOBALAMIN 1000 MCG: 1000 INJECTION, SOLUTION INTRAMUSCULAR; SUBCUTANEOUS at 12:02
